# Patient Record
Sex: FEMALE | Race: WHITE | NOT HISPANIC OR LATINO | Employment: OTHER | URBAN - METROPOLITAN AREA
[De-identification: names, ages, dates, MRNs, and addresses within clinical notes are randomized per-mention and may not be internally consistent; named-entity substitution may affect disease eponyms.]

---

## 2017-03-21 DIAGNOSIS — M85.80 OSTEOPENIA: ICD-10-CM

## 2017-03-21 DIAGNOSIS — E78.5 HYPERLIPIDEMIA, UNSPECIFIED HYPERLIPIDEMIA TYPE: Primary | ICD-10-CM

## 2017-03-21 LAB
ALBUMIN SERPL-MCNC: 4.6 G/DL (ref 3.5–5.2)
ALBUMIN/GLOB SERPL: 1.8 G/DL
ALP SERPL-CCNC: 75 U/L (ref 39–117)
ALT SERPL-CCNC: 21 U/L (ref 1–33)
APPEARANCE UR: CLEAR
AST SERPL-CCNC: 23 U/L (ref 1–32)
BACTERIA #/AREA URNS HPF: ABNORMAL /HPF
BASOPHILS # BLD AUTO: 0.05 10*3/MM3 (ref 0–0.2)
BASOPHILS NFR BLD AUTO: 1.2 % (ref 0–1.5)
BILIRUB SERPL-MCNC: 0.3 MG/DL (ref 0.1–1.2)
BILIRUB UR QL STRIP: NEGATIVE
BUN SERPL-MCNC: 13 MG/DL (ref 6–20)
BUN/CREAT SERPL: 15.7 (ref 7–25)
CALCIUM SERPL-MCNC: 9.9 MG/DL (ref 8.6–10.5)
CASTS URNS MICRO: ABNORMAL
CHLORIDE SERPL-SCNC: 105 MMOL/L (ref 98–107)
CHOLEST SERPL-MCNC: 265 MG/DL (ref 0–200)
CO2 SERPL-SCNC: 25.8 MMOL/L (ref 22–29)
COLOR UR: YELLOW
CREAT SERPL-MCNC: 0.83 MG/DL (ref 0.57–1)
EOSINOPHIL # BLD AUTO: 0.23 10*3/MM3 (ref 0–0.7)
EOSINOPHIL NFR BLD AUTO: 5.5 % (ref 0.3–6.2)
EPI CELLS #/AREA URNS HPF: ABNORMAL /HPF
ERYTHROCYTE [DISTWIDTH] IN BLOOD BY AUTOMATED COUNT: 13.4 % (ref 11.7–13)
GLOBULIN SER CALC-MCNC: 2.5 GM/DL
GLUCOSE SERPL-MCNC: 94 MG/DL (ref 65–99)
GLUCOSE UR QL: NEGATIVE
HCT VFR BLD AUTO: 43.8 % (ref 35.6–45.5)
HDLC SERPL-MCNC: 89 MG/DL (ref 40–60)
HGB BLD-MCNC: 13.8 G/DL (ref 11.9–15.5)
HGB UR QL STRIP: NEGATIVE
IMM GRANULOCYTES # BLD: 0 10*3/MM3 (ref 0–0.03)
IMM GRANULOCYTES NFR BLD: 0 % (ref 0–0.5)
KETONES UR QL STRIP: NEGATIVE
LDLC SERPL CALC-MCNC: 159 MG/DL (ref 0–100)
LEUKOCYTE ESTERASE UR QL STRIP: NEGATIVE
LYMPHOCYTES # BLD AUTO: 1.51 10*3/MM3 (ref 0.9–4.8)
LYMPHOCYTES NFR BLD AUTO: 35.8 % (ref 19.6–45.3)
MCH RBC QN AUTO: 29.9 PG (ref 26.9–32)
MCHC RBC AUTO-ENTMCNC: 31.5 G/DL (ref 32.4–36.3)
MCV RBC AUTO: 94.8 FL (ref 80.5–98.2)
MONOCYTES # BLD AUTO: 0.47 10*3/MM3 (ref 0.2–1.2)
MONOCYTES NFR BLD AUTO: 11.1 % (ref 5–12)
NEUTROPHILS # BLD AUTO: 1.96 10*3/MM3 (ref 1.9–8.1)
NEUTROPHILS NFR BLD AUTO: 46.4 % (ref 42.7–76)
NITRITE UR QL STRIP: NEGATIVE
PH UR STRIP: 5.5 [PH] (ref 5–8)
PLATELET # BLD AUTO: 251 10*3/MM3 (ref 140–500)
POTASSIUM SERPL-SCNC: 4.2 MMOL/L (ref 3.5–5.2)
PROT SERPL-MCNC: 7.1 G/DL (ref 6–8.5)
PROT UR QL STRIP: NEGATIVE
RBC # BLD AUTO: 4.62 10*6/MM3 (ref 3.9–5.2)
RBC #/AREA URNS HPF: ABNORMAL /HPF
SODIUM SERPL-SCNC: 144 MMOL/L (ref 136–145)
SP GR UR: 1.03 (ref 1–1.03)
T4 FREE SERPL-MCNC: 1.14 NG/DL (ref 0.93–1.7)
TRIGL SERPL-MCNC: 86 MG/DL (ref 0–150)
TSH SERPL DL<=0.005 MIU/L-ACNC: 5.06 MIU/ML (ref 0.27–4.2)
UROBILINOGEN UR STRIP-MCNC: (no result) MG/DL
VLDLC SERPL CALC-MCNC: 17.2 MG/DL (ref 5–40)
WBC # BLD AUTO: 4.22 10*3/MM3 (ref 4.5–10.7)
WBC #/AREA URNS HPF: ABNORMAL /HPF

## 2017-03-28 ENCOUNTER — OFFICE VISIT (OUTPATIENT)
Dept: INTERNAL MEDICINE | Facility: CLINIC | Age: 57
End: 2017-03-28

## 2017-03-28 VITALS
HEART RATE: 72 BPM | WEIGHT: 111 LBS | BODY MASS INDEX: 19.67 KG/M2 | DIASTOLIC BLOOD PRESSURE: 70 MMHG | SYSTOLIC BLOOD PRESSURE: 102 MMHG | OXYGEN SATURATION: 98 % | HEIGHT: 63 IN

## 2017-03-28 DIAGNOSIS — M85.80 OSTEOPENIA: ICD-10-CM

## 2017-03-28 DIAGNOSIS — E78.5 HYPERLIPIDEMIA, UNSPECIFIED HYPERLIPIDEMIA TYPE: ICD-10-CM

## 2017-03-28 DIAGNOSIS — Z00.00 MEDICARE ANNUAL WELLNESS VISIT, SUBSEQUENT: Primary | ICD-10-CM

## 2017-03-28 DIAGNOSIS — Z13.9 SCREENING: Primary | ICD-10-CM

## 2017-03-28 PROCEDURE — G0439 PPPS, SUBSEQ VISIT: HCPCS | Performed by: INTERNAL MEDICINE

## 2017-03-28 PROCEDURE — 99212 OFFICE O/P EST SF 10 MIN: CPT | Performed by: INTERNAL MEDICINE

## 2017-03-28 PROCEDURE — 93000 ELECTROCARDIOGRAM COMPLETE: CPT | Performed by: INTERNAL MEDICINE

## 2017-03-28 RX ORDER — MISOPROSTOL 100 UG/1
100 TABLET ORAL 2 TIMES DAILY
COMMUNITY
End: 2019-01-25

## 2017-03-28 NOTE — PROGRESS NOTES
Subjective     Leonie Becker is a 56 y.o. female who presents for an annual wellness visit as well as check up of hld, osteopenia.      History of Present Illness     HLD.  She trys to follow a healthy diet but limited by IC.    Osteopenia.  I recommend to get 1200 mg of calcium and 1000 IUs of vitamin D through diet and supplements and to participate in a weight based exercise to prevent loss of bone mineral density. Bone mineral will be monitored every two years.        Review of Systems   Genitourinary: Positive for dysuria.   Musculoskeletal: Positive for neck pain.       The following portions of the patient's history were reviewed and updated as appropriate: allergies, current medications, past family history, past medical history, past social history, past surgical history and problem list.  Health maintenance tab was reviewed and updated with the patient.       Patient Active Problem List    Diagnosis Date Noted   • Pelvic floor dysfunction 03/24/2016   • Atopic rhinitis 03/23/2016   • Neck pain 03/23/2016   • Chronic interstitial cystitis 03/23/2016     Note Last Updated: 3/23/2016     Description: Extreme case followed by urologist at  as well as Dr. Mendez Franz.     • Degeneration of intervertebral disc of cervical region 03/23/2016   • Hyperlipidemia 03/23/2016     Note Last Updated: 3/28/2017     Description: 1.9% 10 year risk of ASCVD in 12/8/14; 1.6% on 3/23/16, 1.5% 10 year risk 3/28/2017     • Osteopenia 03/23/2016   • Tinnitus 02/01/2016   • Degeneration of intervertebral disc of mid-cervical region 02/01/2016       Past Medical History:   Diagnosis Date   • Abnormal blood smear    • Abnormal liver function test    • Back pain    • Blood tests for routine general physical examination    • Facial basal cell cancer    • Limb pain    • Rosacea        Past Surgical History:   Procedure Laterality Date   • CYSTOSCOPY      Diagnostic   • KNEE SURGERY         Family History   Problem Relation Age of  Onset   • Hypertension Mother      benign essential   • Diabetes Mother    • Heart disease Mother    • Thyroid disease Mother    • Aortic aneurysm Father      Thoracic   • Glaucoma Father    • Depression Sister    • Thyroid disease Sister        Social History     Social History   • Marital status: Single     Spouse name: N/A   • Number of children: N/A   • Years of education: N/A     Occupational History   • Not on file.     Social History Main Topics   • Smoking status: Former Smoker     Years: 2.00     Start date: 1992     Quit date: 1994   • Smokeless tobacco: Never Used   • Alcohol use No   • Drug use: No   • Sexual activity: Not on file     Other Topics Concern   • Not on file     Social History Narrative       Current Outpatient Prescriptions on File Prior to Visit   Medication Sig Dispense Refill   • baclofen (LIORESAL) 10 MG tablet Take by mouth 3 (three) times a day as needed. 1 to 2 tablets     • Calcium-Magnesium 200-50 MG tablet Take by mouth.     • Cholecalciferol (VITAMIN D3) 400 UNITS capsule Take by mouth.     • Cod Liver Oil 10 MINIM capsule Take by mouth.     • diazepam (VALIUM) 5 MG tablet Take 1 tablet by mouth as needed.     • Efinaconazole 10 % solution Apply topically.     • Estriol powder      • Hormone Cream Base cream      • lidocaine (LIDODERM) 5 % Apply 1 patch topically as needed.     • metroNIDAZOLE (METROCREAM) 0.75 % cream   6   • Omega-3 Fatty Acids (FISH OIL PO) Take by mouth.     • oxyCODONE (ROXICODONE) 5 MG immediate release tablet Take 1 to 2 tablets every 4 to 6 hours as needed for pain     • promethazine (PHENERGAN) 25 MG tablet   2   • traMADol (ULTRAM) 50 MG tablet Take by mouth.     • Urea 45 % gel Apply topically 2 (two) times a day. as directed Apply sparingly to the affected area(s)     • vitamin E 1000 UNIT capsule Take 1 capsule by mouth daily.     • Progesterone Micronized (PROGESTERONE, BULK,) powder Place on the skin.       • [DISCONTINUED] CALCIUM PO Take by  "mouth.     • [DISCONTINUED] nortriptyline (PAMELOR) 10 MG capsule Take by mouth.     • [DISCONTINUED] ondansetron (ZOFRAN) 4 MG tablet Take 1 tablet by mouth every 8 (eight) hours as needed.     • [DISCONTINUED] pentosan polysulfate (ELMIRON) 100 MG capsule Take by mouth.     • [DISCONTINUED] uribel (URO-MP) 118 MG capsule capsule Take 1 capsule by mouth every 3 (three) hours.       No current facility-administered medications on file prior to visit.        No Known Allergies    Immunization History   Administered Date(s) Administered   • Influenza, Quadrivalent 10/08/2015   • Tdap 10/01/2006       Objective     /70  Pulse 72  Ht 63\" (160 cm)  Wt 111 lb (50.3 kg)  SpO2 98%  BMI 19.66 kg/m2      Physical Exam   Constitutional: She is oriented to person, place, and time. She appears well-developed and well-nourished.   HENT:   Head: Normocephalic and atraumatic.   Right Ear: Hearing, tympanic membrane and external ear normal.   Left Ear: Hearing, tympanic membrane and external ear normal.   Nose: Nose normal.   Mouth/Throat: Oropharynx is clear and moist.   Neck: Neck supple. No thyromegaly present.   Cardiovascular: Normal rate, regular rhythm and normal heart sounds.    No murmur heard.  Pulmonary/Chest: Effort normal and breath sounds normal. Right breast exhibits no mass. Left breast exhibits no mass.   Abdominal: Soft. She exhibits no distension. There is no hepatosplenomegaly. There is no tenderness.   Genitourinary: No breast tenderness.   Lymphadenopathy:     She has no cervical adenopathy.   Neurological: She is alert and oriented to person, place, and time.   Skin: Skin is warm and dry.   Psychiatric: She has a normal mood and affect.       ECG 12 Lead  Date/Time: 3/28/2017 12:41 PM  Performed by: JAD RUVALCABA  Authorized by: JAD RUVALCABA   Comparison: compared with previous ECG from 3/16/2016  Similar to previous ECG  Rhythm: sinus rhythm  Rate: normal  Conduction: conduction normal  ST " Segments: ST segments normal  QRS axis: normal  Clinical impression: normal ECG            Assessment/Plan   Leonie was seen today for annual exam.    Diagnoses and all orders for this visit:    Medicare annual wellness visit, subsequent    Hyperlipidemia, unspecified hyperlipidemia type    Osteopenia    Other orders  -     Cancel: Tdap Vaccine Greater Than or Equal To 6yo IM  -     Cancel: ECG 12 Lead  -     ECG 12 Lead        Discussion    AWV.  See scanned forms for kannan history, PHQ-9, functional ability questionnaire, cognitive impairment screening.  These were all reviewed with the patient and the patient was provided with a personal prevention plan of service in patient instructions.  Patient was given advice or information on the following topics:  nutrition   HLD.  We discussed diet.  I recommend a diet high in fruits, vegetables, whole grains, lean meats, nuts and beans.  I recommend limiting red meat, full fat dairy, eggs and processed white carbohydrates.  I recommend aerobic exercise at least 3 days per week. I also recommend to watch salt intake.    Osteonpenia. Continue calcium and D.      Health Maintenance   Topic Date Due   • MEDICARE ANNUAL WELLNESS  03/16/2016   • DXA SCAN  03/21/2017   • LIPID PANEL  03/21/2018   • MAMMOGRAM  08/31/2018   • PAP SMEAR  08/31/2019   • COLONOSCOPY  09/10/2024   • TDAP/TD VACCINES (3 - Td) 03/28/2027   • HEPATITIS C SCREENING  Completed   • INFLUENZA VACCINE  Completed            Future Appointments  Date Time Provider Department Center   4/11/2017 2:00 PM [facility] None

## 2017-03-28 NOTE — PROGRESS NOTES
Devonte Becker is a 56 y.o. female who presents for a complete physical exam.      History of Present Illness     Review of Systems    The following portions of the patient's history were reviewed and updated as appropriate: allergies, current medications, past family history, past medical history, past social history, past surgical history and problem list.  Health maintenance tab was reviewed and updated with the patient.       Patient Active Problem List    Diagnosis Date Noted   • Pelvic floor dysfunction 03/24/2016   • Abnormal LFTs 03/24/2016     Note Last Updated: 3/24/2016     Previous negative iron studies and hepatitis panel.  Normal US.  Thought to be med induced and is mild.       • Atopic rhinitis 03/23/2016   • Neck pain 03/23/2016   • Chronic interstitial cystitis 03/23/2016     Note Last Updated: 3/23/2016     Description: Extreme case followed by urologist at  as well as Dr. Mendez Franz.     • Degeneration of intervertebral disc of cervical region 03/23/2016   • Hyperlipidemia 03/23/2016     Note Last Updated: 3/28/2017     Description: 1.9% 10 year risk of ASCVD in 12/8/14; 1.6% on 3/23/16, 1.5% 10 year risk 3/28/2017     • Osteopenia 03/23/2016   • Tinnitus 02/01/2016   • Degeneration of intervertebral disc of mid-cervical region 02/01/2016       Past Medical History:   Diagnosis Date   • Abnormal blood smear    • Abnormal liver function test    • Back pain    • Blood tests for routine general physical examination    • Limb pain    • Rosacea        Past Surgical History:   Procedure Laterality Date   • CYSTOSCOPY      Diagnostic   • KNEE SURGERY         Family History   Problem Relation Age of Onset   • Hypertension Mother      benign essential   • Diabetes Mother    • Heart disease Mother    • Thyroid disease Mother    • Aortic aneurysm Father      Thoracic   • Glaucoma Father    • Depression Sister    • Thyroid disease Sister        Social History     Social History   •  Marital status: Single     Spouse name: N/A   • Number of children: N/A   • Years of education: N/A     Occupational History   • Not on file.     Social History Main Topics   • Smoking status: Former Smoker     Years: 2.00     Start date: 1992     Quit date: 1994   • Smokeless tobacco: Never Used   • Alcohol use No   • Drug use: No   • Sexual activity: Not on file     Other Topics Concern   • Not on file     Social History Narrative       Current Outpatient Prescriptions on File Prior to Visit   Medication Sig Dispense Refill   • baclofen (LIORESAL) 10 MG tablet Take by mouth 3 (three) times a day as needed. 1 to 2 tablets     • Calcium-Magnesium 200-50 MG tablet Take by mouth.     • Cholecalciferol (VITAMIN D3) 400 UNITS capsule Take by mouth.     • Cod Liver Oil 10 MINIM capsule Take by mouth.     • diazepam (VALIUM) 5 MG tablet Take 1 tablet by mouth as needed.     • Efinaconazole 10 % solution Apply topically.     • Estriol powder      • Hormone Cream Base cream      • lidocaine (LIDODERM) 5 % Apply 1 patch topically as needed.     • metroNIDAZOLE (METROCREAM) 0.75 % cream   6   • Omega-3 Fatty Acids (FISH OIL PO) Take by mouth.     • oxyCODONE (ROXICODONE) 5 MG immediate release tablet Take 1 to 2 tablets every 4 to 6 hours as needed for pain     • promethazine (PHENERGAN) 25 MG tablet   2   • traMADol (ULTRAM) 50 MG tablet Take by mouth.     • Urea 45 % gel Apply topically 2 (two) times a day. as directed Apply sparingly to the affected area(s)     • vitamin E 1000 UNIT capsule Take 1 capsule by mouth daily.     • Progesterone Micronized (PROGESTERONE, BULK,) powder Place on the skin.       • [DISCONTINUED] CALCIUM PO Take by mouth.     • [DISCONTINUED] nortriptyline (PAMELOR) 10 MG capsule Take by mouth.     • [DISCONTINUED] ondansetron (ZOFRAN) 4 MG tablet Take 1 tablet by mouth every 8 (eight) hours as needed.     • [DISCONTINUED] pentosan polysulfate (ELMIRON) 100 MG capsule Take by mouth.     •  "[DISCONTINUED] uribel (URO-MP) 118 MG capsule capsule Take 1 capsule by mouth every 3 (three) hours.       No current facility-administered medications on file prior to visit.        No Known Allergies    Immunization History   Administered Date(s) Administered   • Influenza, Quadrivalent 10/08/2015   • Tdap 10/01/2006       Objective     /70  Pulse 72  Ht 63\" (160 cm)  Wt 111 lb (50.3 kg)  SpO2 98%  BMI 19.66 kg/m2    Physical Exam      ECG 12 Lead  Date/Time: 3/28/2017 10:55 AM  Performed by: JAD RUVALCABA  Authorized by: JAD RUVALCABA                 Assessment/Plan   Leonie was seen today for annual exam.    Diagnoses and all orders for this visit:    Well adult exam    Other orders  -     Cancel: Tdap Vaccine Greater Than or Equal To 8yo IM        Discussion    Patient presents today for a CPE.      {[id number]}    Health Maintenance   Topic Date Due   • DXA SCAN  03/21/2017   • LIPID PANEL  03/21/2018   • MAMMOGRAM  08/31/2018   • PAP SMEAR  08/31/2019   • COLONOSCOPY  09/10/2024   • TDAP/TD VACCINES (3 - Td) 03/28/2027   • HEPATITIS C SCREENING  Completed   • INFLUENZA VACCINE  Completed            No future appointments.    "

## 2017-04-05 ENCOUNTER — HOSPITAL ENCOUNTER (OUTPATIENT)
Dept: CARDIOLOGY | Facility: HOSPITAL | Age: 57
Discharge: HOME | End: 2017-04-05
Admitting: INTERNAL MEDICINE

## 2017-04-05 VITALS
BODY MASS INDEX: 19.49 KG/M2 | HEIGHT: 63 IN | HEART RATE: 59 BPM | SYSTOLIC BLOOD PRESSURE: 111 MMHG | DIASTOLIC BLOOD PRESSURE: 56 MMHG | WEIGHT: 110 LBS

## 2017-04-05 DIAGNOSIS — Z13.9 SCREENING: ICD-10-CM

## 2017-04-05 LAB
BH CV ECHO MEAS - DIST AO DIAM: 1.46 CM
BH CV VAS BP LEFT ARM: NORMAL MMHG
BH CV VAS BP RIGHT ARM: NORMAL MMHG
BH CV XLRA MEAS - MID AO DIAM: 1.52 CM
BH CV XLRA MEAS - PAD LEFT ABI DP: 1.02
BH CV XLRA MEAS - PAD LEFT ABI PT: 1.04
BH CV XLRA MEAS - PAD LEFT ARM: 106 MMHG
BH CV XLRA MEAS - PAD LEFT LEG DP: 114 MMHG
BH CV XLRA MEAS - PAD LEFT LEG PT: 116 MMHG
BH CV XLRA MEAS - PAD RIGHT ABI DP: 1.02
BH CV XLRA MEAS - PAD RIGHT ABI PT: 1.04
BH CV XLRA MEAS - PAD RIGHT ARM: 111 MMHG
BH CV XLRA MEAS - PAD RIGHT LEG DP: 114 MMHG
BH CV XLRA MEAS - PAD RIGHT LEG PT: 116 MMHG
BH CV XLRA MEAS - PROX AO DIAM: 1.73 CM
BH CV XLRA MEAS LEFT ICA/CCA RATIO: 0.95
BH CV XLRA MEAS LEFT MID CCA PSV: NORMAL CM/SEC
BH CV XLRA MEAS LEFT MID ICA PSV: NORMAL CM/SEC
BH CV XLRA MEAS LEFT PROX ECA PSV: NORMAL CM/SEC
BH CV XLRA MEAS RIGHT ICA/CCA RATIO: 1.53
BH CV XLRA MEAS RIGHT MID CCA PSV: NORMAL CM/SEC
BH CV XLRA MEAS RIGHT MID ICA PSV: NORMAL CM/SEC
BH CV XLRA MEAS RIGHT PROX ECA PSV: NORMAL CM/SEC

## 2017-04-05 PROCEDURE — 93799 UNLISTED CV SVC/PROCEDURE: CPT

## 2017-04-11 ENCOUNTER — CLINICAL SUPPORT (OUTPATIENT)
Dept: INTERNAL MEDICINE | Facility: CLINIC | Age: 57
End: 2017-04-11

## 2017-04-11 DIAGNOSIS — Z78.0 POSTMENOPAUSAL: Primary | ICD-10-CM

## 2017-04-11 PROCEDURE — 77080 DXA BONE DENSITY AXIAL: CPT | Performed by: INTERNAL MEDICINE

## 2017-04-24 ENCOUNTER — OFFICE VISIT (OUTPATIENT)
Dept: INTERNAL MEDICINE | Facility: CLINIC | Age: 57
End: 2017-04-24

## 2017-04-24 VITALS
DIASTOLIC BLOOD PRESSURE: 76 MMHG | BODY MASS INDEX: 19.84 KG/M2 | HEIGHT: 63 IN | WEIGHT: 112 LBS | SYSTOLIC BLOOD PRESSURE: 110 MMHG | OXYGEN SATURATION: 100 % | HEART RATE: 68 BPM

## 2017-04-24 DIAGNOSIS — M81.0 OSTEOPOROSIS: Primary | ICD-10-CM

## 2017-04-24 PROCEDURE — 99213 OFFICE O/P EST LOW 20 MIN: CPT | Performed by: INTERNAL MEDICINE

## 2017-04-24 PROCEDURE — 90471 IMMUNIZATION ADMIN: CPT | Performed by: INTERNAL MEDICINE

## 2017-04-24 PROCEDURE — 90715 TDAP VACCINE 7 YRS/> IM: CPT | Performed by: INTERNAL MEDICINE

## 2017-04-24 RX ORDER — ALENDRONATE SODIUM 70 MG/1
70 TABLET ORAL
Qty: 12 TABLET | Refills: 3 | Status: SHIPPED | OUTPATIENT
Start: 2017-04-24 | End: 2018-05-26 | Stop reason: SDUPTHER

## 2017-04-24 NOTE — PROGRESS NOTES
Devonte Becker is a 56 y.o. female who presents with   Chief Complaint   Patient presents with   • Osteoporosis       History of Present Illness     I reviewed recent BMD with the patient which showed osteoporosis in the spine.  I reviewed treatments for bone density with the patient.  We discussed the  bisphosphonate class of drugs.  I reviewed the side effects which include esophagitis, rare atypical fracture and rare jaw osteonecrosis.  The benefit is a decrease in the risk for fracture.  Reviewed with patient how to take bisphosphonate class of drugs.  The are to be taken with a full class of water first thing in the morning with nothing by mouth for one hour after. The patient has no ongoing dental issues.  She has no issues with reflux.      Review of Systems   HENT: Negative.    Gastrointestinal: Negative.        The following portions of the patient's history were reviewed and updated as appropriate: allergies, current medications and problem list.    Patient Active Problem List    Diagnosis Date Noted   • Pelvic floor dysfunction 03/24/2016   • Atopic rhinitis 03/23/2016   • Neck pain 03/23/2016   • Chronic interstitial cystitis 03/23/2016     Note Last Updated: 3/23/2016     Description: Extreme case followed by urologist at  as well as Dr. Mendez Frazn.     • Degeneration of intervertebral disc of cervical region 03/23/2016   • Hyperlipidemia 03/23/2016     Note Last Updated: 3/28/2017     Description: 1.9% 10 year risk of ASCVD in 12/8/14; 1.6% on 3/23/16, 1.5% 10 year risk 3/28/2017     • Osteoporosis 03/23/2016   • Tinnitus 02/01/2016   • Degeneration of intervertebral disc of mid-cervical region 02/01/2016       Current Outpatient Prescriptions on File Prior to Visit   Medication Sig Dispense Refill   • Calcium-Magnesium 200-50 MG tablet Take by mouth.     • Cholecalciferol (VITAMIN D3) 400 UNITS capsule Take by mouth.     • Cod Liver Oil 10 MINIM capsule Take by mouth.     •  "diclofenac (VOLTAREN) 1 % gel gel Apply 4 g topically 4 (Four) Times a Day As Needed.     • Efinaconazole 10 % solution Apply topically.     • Estriol powder      • Hormone Cream Base cream      • lidocaine (LIDODERM) 5 % Apply 1 patch topically as needed.     • metroNIDAZOLE (METROCREAM) 0.75 % cream   6   • misoprostol (CYTOTEC) 100 MCG tablet Take 100 mcg by mouth 2 (Two) Times a Day.     • Omega-3 Fatty Acids (FISH OIL PO) Take by mouth.     • oxyCODONE (ROXICODONE) 5 MG immediate release tablet Take 1 to 2 tablets every 4 to 6 hours as needed for pain     • Progesterone Micronized (PROGESTERONE, BULK,) powder Place on the skin.       • promethazine (PHENERGAN) 25 MG tablet   2   • traMADol (ULTRAM) 50 MG tablet Take by mouth.     • Urea 45 % gel Apply topically 2 (two) times a day. as directed Apply sparingly to the affected area(s)     • vitamin E 1000 UNIT capsule Take 1 capsule by mouth daily.     • baclofen (LIORESAL) 10 MG tablet Take by mouth 3 (three) times a day as needed. 1 to 2 tablets     • diazepam (VALIUM) 5 MG tablet Take 1 tablet by mouth as needed.       No current facility-administered medications on file prior to visit.        Objective     /76  Pulse 68  Ht 63\" (160 cm)  Wt 112 lb (50.8 kg)  SpO2 100%  BMI 19.84 kg/m2    Physical Exam   Constitutional: She is oriented to person, place, and time. She appears well-developed and well-nourished.   HENT:   Head: Normocephalic and atraumatic.   Pulmonary/Chest: Effort normal.   Neurological: She is alert and oriented to person, place, and time.   Psychiatric: She has a normal mood and affect. Her behavior is normal.       Assessment/Plan   Leonie was seen today for osteoporosis.    Diagnoses and all orders for this visit:    Osteoporosis    Other orders  -     Tdap Vaccine Greater Than or Equal To 6yo IM  -     alendronate (FOSAMAX) 70 MG tablet; Take 1 tablet by mouth Every 7 (Seven) Days.        Discussion    Patient presents with a " drop in BMD to the level of osteoporosis.  I recommend to get 1200 mg of calcium and 1000 IUs of vitamin D through diet and supplements and to participate in a weight based exercise to prevent loss of bone mineral density. Bone mineral will be monitored every two years.  She is agreeable to trial of Fosamax.  See counseling above.  She will let me know any issues with the medication.  15 minutes spent with the patient with greater than 50% of time spent counseling the following topics:, diagnostic results, risks and benefits of treatment options, impressions             No future appointments.

## 2017-11-07 ENCOUNTER — APPOINTMENT (OUTPATIENT)
Dept: WOMENS IMAGING | Facility: HOSPITAL | Age: 57
End: 2017-11-07

## 2017-11-07 PROCEDURE — 77063 BREAST TOMOSYNTHESIS BI: CPT | Performed by: RADIOLOGY

## 2017-11-07 PROCEDURE — G0202 SCR MAMMO BI INCL CAD: HCPCS | Performed by: RADIOLOGY

## 2017-11-17 ENCOUNTER — OFFICE VISIT (OUTPATIENT)
Dept: INTERNAL MEDICINE | Facility: CLINIC | Age: 57
End: 2017-11-17

## 2017-11-17 VITALS
HEART RATE: 91 BPM | BODY MASS INDEX: 18.61 KG/M2 | SYSTOLIC BLOOD PRESSURE: 112 MMHG | DIASTOLIC BLOOD PRESSURE: 80 MMHG | HEIGHT: 63 IN | WEIGHT: 105 LBS | OXYGEN SATURATION: 99 % | TEMPERATURE: 97.6 F

## 2017-11-17 DIAGNOSIS — J02.9 ACUTE PHARYNGITIS, UNSPECIFIED ETIOLOGY: Primary | ICD-10-CM

## 2017-11-17 LAB
EXPIRATION DATE: NORMAL
INTERNAL CONTROL: NORMAL
Lab: NORMAL
S PYO AG THROAT QL: NEGATIVE

## 2017-11-17 PROCEDURE — 87880 STREP A ASSAY W/OPTIC: CPT | Performed by: INTERNAL MEDICINE

## 2017-11-17 PROCEDURE — 99213 OFFICE O/P EST LOW 20 MIN: CPT | Performed by: INTERNAL MEDICINE

## 2017-11-17 NOTE — PROGRESS NOTES
Devonte Becker is a 57 y.o. female who presents with   Chief Complaint   Patient presents with   • Sore Throat       History of Present Illness     Going on one week.  Lost voice four days ago.  Feels like lump in throat. No head or nasal congestion.  Cough which is dry.  No SOA.      Review of Systems   Constitutional: Positive for fatigue. Negative for fever.   HENT: Positive for sore throat.    Respiratory: Negative for shortness of breath.    Cardiovascular: Negative for chest pain.   Musculoskeletal: Positive for arthralgias.       The following portions of the patient's history were reviewed and updated as appropriate: allergies, current medications and problem list.    Patient Active Problem List    Diagnosis Date Noted   • Pelvic floor dysfunction 03/24/2016   • Atopic rhinitis 03/23/2016   • Neck pain 03/23/2016   • Chronic interstitial cystitis 03/23/2016     Note Last Updated: 3/23/2016     Description: Extreme case followed by urologist at  as well as Dr. Mendez Franz.     • Degeneration of intervertebral disc of cervical region 03/23/2016   • Hyperlipidemia 03/23/2016     Note Last Updated: 3/28/2017     Description: 1.9% 10 year risk of ASCVD in 12/8/14; 1.6% on 3/23/16, 1.5% 10 year risk 3/28/2017     • Osteoporosis 03/23/2016   • Tinnitus 02/01/2016   • Degeneration of intervertebral disc of mid-cervical region 02/01/2016       Current Outpatient Prescriptions on File Prior to Visit   Medication Sig Dispense Refill   • alendronate (FOSAMAX) 70 MG tablet Take 1 tablet by mouth Every 7 (Seven) Days. 12 tablet 3   • baclofen (LIORESAL) 10 MG tablet Take by mouth 3 (three) times a day as needed. 1 to 2 tablets     • Calcium-Magnesium 200-50 MG tablet Take by mouth.     • Cholecalciferol (VITAMIN D3) 400 UNITS capsule Take by mouth.     • Cod Liver Oil 10 MINIM capsule Take by mouth.     • diazepam (VALIUM) 5 MG tablet Take 1 tablet by mouth as needed.     • diclofenac (VOLTAREN) 1 %  "gel gel Apply 4 g topically 4 (Four) Times a Day As Needed.     • Efinaconazole 10 % solution Apply topically.     • Estriol powder      • Hormone Cream Base cream      • lidocaine (LIDODERM) 5 % Apply 1 patch topically as needed.     • misoprostol (CYTOTEC) 100 MCG tablet Take 100 mcg by mouth 2 (Two) Times a Day.     • Omega-3 Fatty Acids (FISH OIL PO) Take by mouth.     • oxyCODONE (ROXICODONE) 5 MG immediate release tablet Take 1 to 2 tablets every 4 to 6 hours as needed for pain     • Progesterone Micronized (PROGESTERONE, BULK,) powder Place on the skin.       • promethazine (PHENERGAN) 25 MG tablet   2   • traMADol (ULTRAM) 50 MG tablet Take by mouth.     • Urea 45 % gel Apply topically 2 (two) times a day. as directed Apply sparingly to the affected area(s)     • vitamin E 1000 UNIT capsule Take 1 capsule by mouth daily.     • metroNIDAZOLE (METROCREAM) 0.75 % cream   6     No current facility-administered medications on file prior to visit.        Objective     /80  Pulse 91  Temp 97.6 °F (36.4 °C)  Ht 63\" (160 cm)  Wt 105 lb (47.6 kg)  SpO2 99%  BMI 18.6 kg/m2    Physical Exam   Constitutional: She is oriented to person, place, and time. She appears well-developed and well-nourished.   HENT:   Head: Normocephalic and atraumatic.   Right Ear: Hearing and tympanic membrane normal.   Left Ear: Hearing and tympanic membrane normal.   Mouth/Throat: Posterior oropharyngeal erythema present. No oropharyngeal exudate.   Cardiovascular: Normal rate, regular rhythm and normal heart sounds.    Pulmonary/Chest: Effort normal and breath sounds normal.   Neurological: She is alert and oriented to person, place, and time.   Skin: Skin is warm and dry.   Psychiatric: She has a normal mood and affect. Her behavior is normal.       Assessment/Plan   Leonie was seen today for sore throat.    Diagnoses and all orders for this visit:    Acute pharyngitis, unspecified etiology  -     POC Rapid Strep A  -     Throat " / Upper Respiratory Culture - Swab, Throat        Discussion  Patient presents with acute pharyngitis.  Results of the rapid group A strep in the office is negative.  We will send for culture and f/u on that.  She declines antibiotics right now.  Treat symptomatically with OTC tylenol, rest and fluids.  Let us know if not feeling better over the next 48 hours.  Advised that she is infectious to others.         Future Appointments  Date Time Provider Department Center   11/17/2017 4:30 PM MD REGULO [facility]  [facility] None

## 2017-11-21 LAB
BACTERIA SPEC RESP CULT: NORMAL
BACTERIA SPEC RESP CULT: NORMAL

## 2018-05-29 RX ORDER — ALENDRONATE SODIUM 70 MG/1
TABLET ORAL
Qty: 12 TABLET | Refills: 0 | Status: SHIPPED | OUTPATIENT
Start: 2018-05-29 | End: 2018-07-06 | Stop reason: SDUPTHER

## 2018-07-06 RX ORDER — ALENDRONATE SODIUM 70 MG/1
TABLET ORAL
Qty: 12 TABLET | Refills: 0 | Status: SHIPPED | OUTPATIENT
Start: 2018-07-06 | End: 2018-09-30 | Stop reason: SDUPTHER

## 2018-07-31 DIAGNOSIS — E78.5 HYPERLIPIDEMIA, UNSPECIFIED HYPERLIPIDEMIA TYPE: Primary | ICD-10-CM

## 2018-08-02 LAB
ALBUMIN SERPL-MCNC: 4.8 G/DL (ref 3.5–5.2)
ALBUMIN/GLOB SERPL: 2 G/DL
ALP SERPL-CCNC: 84 U/L (ref 39–117)
ALT SERPL-CCNC: 25 U/L (ref 1–33)
AST SERPL-CCNC: 23 U/L (ref 1–32)
BILIRUB SERPL-MCNC: 0.3 MG/DL (ref 0.1–1.2)
BUN SERPL-MCNC: 17 MG/DL (ref 8–23)
BUN/CREAT SERPL: 16.8 (ref 7–25)
CALCIUM SERPL-MCNC: 9.1 MG/DL (ref 8.6–10.5)
CHLORIDE SERPL-SCNC: 103 MMOL/L (ref 98–107)
CHOLEST SERPL-MCNC: 293 MG/DL (ref 0–200)
CO2 SERPL-SCNC: 25.5 MMOL/L (ref 22–29)
CREAT SERPL-MCNC: 1.01 MG/DL (ref 0.57–1)
GLOBULIN SER CALC-MCNC: 2.4 GM/DL
GLUCOSE SERPL-MCNC: 92 MG/DL (ref 65–99)
HDLC SERPL-MCNC: 100 MG/DL (ref 40–60)
LDLC SERPL CALC-MCNC: 175 MG/DL (ref 0–100)
POTASSIUM SERPL-SCNC: 3.7 MMOL/L (ref 3.5–5.2)
PROT SERPL-MCNC: 7.2 G/DL (ref 6–8.5)
SODIUM SERPL-SCNC: 142 MMOL/L (ref 136–145)
TRIGL SERPL-MCNC: 89 MG/DL (ref 0–150)
VLDLC SERPL CALC-MCNC: 17.8 MG/DL (ref 5–40)

## 2018-08-07 ENCOUNTER — OFFICE VISIT (OUTPATIENT)
Dept: INTERNAL MEDICINE | Facility: CLINIC | Age: 58
End: 2018-08-07

## 2018-08-07 VITALS
BODY MASS INDEX: 18.42 KG/M2 | OXYGEN SATURATION: 98 % | HEART RATE: 71 BPM | SYSTOLIC BLOOD PRESSURE: 108 MMHG | WEIGHT: 104 LBS | DIASTOLIC BLOOD PRESSURE: 70 MMHG

## 2018-08-07 DIAGNOSIS — E78.5 HYPERLIPIDEMIA, UNSPECIFIED HYPERLIPIDEMIA TYPE: Primary | ICD-10-CM

## 2018-08-07 DIAGNOSIS — Z13.6 SCREENING FOR HEART DISEASE: ICD-10-CM

## 2018-08-07 DIAGNOSIS — M81.0 OSTEOPOROSIS, UNSPECIFIED OSTEOPOROSIS TYPE, UNSPECIFIED PATHOLOGICAL FRACTURE PRESENCE: ICD-10-CM

## 2018-08-07 PROCEDURE — 99213 OFFICE O/P EST LOW 20 MIN: CPT | Performed by: INTERNAL MEDICINE

## 2018-08-07 NOTE — PROGRESS NOTES
Devonte Becker is a 58 y.o. female who presents with   Chief Complaint   Patient presents with   • Hyperlipidemia   • Osteoporosis       History of Present Illness     HLD.   Her cholesterol is elevated.  She is very limited from a dietary standpoint on what she can eat with the IC.  She really does not want to take meds.  10 year risk calculated at 2.1%.  Vascular screen one year ago normal. Discussed CT coronary calcium.   OP.  She is on Fosamax without issue.    Review of Systems   Respiratory: Negative.    Cardiovascular: Negative.        The following portions of the patient's history were reviewed and updated as appropriate: allergies, current medications and problem list.    Patient Active Problem List    Diagnosis Date Noted   • Pelvic floor dysfunction 03/24/2016   • Atopic rhinitis 03/23/2016   • Neck pain 03/23/2016   • Chronic interstitial cystitis 03/23/2016     Note Last Updated: 3/23/2016     Description: Extreme case followed by urologist at  as well as Dr. Mendez Franz.     • Degeneration of intervertebral disc of cervical region 03/23/2016   • Hyperlipidemia 03/23/2016     Note Last Updated: 8/7/2018     Description: 1.9% 10 year risk of ASCVD in 12/8/14; 1.6% on 3/23/16, 1.5% 10 year risk 3/28/2017;  2.1% 10 year risk.       • Osteoporosis 03/23/2016   • Tinnitus 02/01/2016   • Degeneration of intervertebral disc of mid-cervical region 02/01/2016       Current Outpatient Prescriptions on File Prior to Visit   Medication Sig Dispense Refill   • alendronate (FOSAMAX) 70 MG tablet TAKE 1 TABLET BY MOUTH ONCE WEEKLY 12 tablet 0   • baclofen (LIORESAL) 10 MG tablet Take by mouth 3 (three) times a day as needed. 1 to 2 tablets     • Calcium-Magnesium 200-50 MG tablet Take by mouth.     • Cholecalciferol (VITAMIN D3) 400 UNITS capsule Take by mouth.     • Cod Liver Oil 10 MINIM capsule Take by mouth.     • diazepam (VALIUM) 5 MG tablet Take 1 tablet by mouth as needed.     • diclofenac  (VOLTAREN) 1 % gel gel Apply 4 g topically 4 (Four) Times a Day As Needed.     • Efinaconazole 10 % solution Apply topically.     • Estriol powder      • Hormone Cream Base cream      • lidocaine (LIDODERM) 5 % Apply 1 patch topically as needed.     • metroNIDAZOLE (METROCREAM) 0.75 % cream   6   • misoprostol (CYTOTEC) 100 MCG tablet Take 100 mcg by mouth 2 (Two) Times a Day.     • Omega-3 Fatty Acids (FISH OIL PO) Take by mouth.     • oxyCODONE (ROXICODONE) 5 MG immediate release tablet Take 1 to 2 tablets every 4 to 6 hours as needed for pain     • Progesterone Micronized (PROGESTERONE, BULK,) powder Place on the skin.       • promethazine (PHENERGAN) 25 MG tablet   2   • traMADol (ULTRAM) 50 MG tablet Take by mouth.     • Urea 45 % gel Apply topically 2 (two) times a day. as directed Apply sparingly to the affected area(s)     • vitamin E 1000 UNIT capsule Take 1 capsule by mouth daily.       No current facility-administered medications on file prior to visit.        Objective     /70   Pulse 71   Wt 47.2 kg (104 lb)   SpO2 98%   BMI 18.42 kg/m²     Physical Exam   Constitutional: She is oriented to person, place, and time. She appears well-developed and well-nourished.   HENT:   Head: Normocephalic and atraumatic.   Pulmonary/Chest: Effort normal.   Neurological: She is alert and oriented to person, place, and time.   Psychiatric: She has a normal mood and affect. Her behavior is normal.       Assessment/Plan   Leonie was seen today for hyperlipidemia and osteoporosis.    Diagnoses and all orders for this visit:    Hyperlipidemia, unspecified hyperlipidemia type    Osteoporosis, unspecified osteoporosis type, unspecified pathological fracture presence    Screening for heart disease  -     CT cardiac calcium score wo dye; Future        Discussion  HLD.  We discussed diet.  I recommend a diet high in fruits, vegetables, whole grains, lean meats, nuts and beans.  I recommend limiting red meat, full fat  dairy, eggs and processed white carbohydrates. CT calcium screen in ordered.   OP.  I recommend to get 1200 mg of calcium and 1000 IUs of vitamin D through diet and supplements and to participate in a weight based exercise to prevent loss of bone mineral density. Bone mineral will be monitored every two years.  Continue Fosamax.           Future Appointments  Date Time Provider Department Center   1/21/2019 10:00 AM [facility] None   1/25/2019 9:00 AM Rachael Oconnell MD [facility] None

## 2018-08-21 ENCOUNTER — TELEPHONE (OUTPATIENT)
Dept: INTERNAL MEDICINE | Facility: CLINIC | Age: 58
End: 2018-08-21

## 2018-08-21 NOTE — TELEPHONE ENCOUNTER
Pt would like to know if she can start eating eggs once a week?  LG    OK to eat eggs once a week.  AJAYW

## 2018-09-17 ENCOUNTER — TELEPHONE (OUTPATIENT)
Dept: INTERNAL MEDICINE | Facility: CLINIC | Age: 58
End: 2018-09-17

## 2018-09-17 DIAGNOSIS — Z01.10 ENCOUNTER FOR HEARING EVALUATION: Primary | ICD-10-CM

## 2018-09-17 NOTE — TELEPHONE ENCOUNTER
She would like a referral to ENT. [facility]    For what issue?  SLW    To Dr Quiroga for hearing evaluation.

## 2018-09-21 ENCOUNTER — OFFICE VISIT (OUTPATIENT)
Dept: INTERNAL MEDICINE | Facility: CLINIC | Age: 58
End: 2018-09-21

## 2018-09-21 VITALS
DIASTOLIC BLOOD PRESSURE: 74 MMHG | WEIGHT: 103 LBS | OXYGEN SATURATION: 98 % | BODY MASS INDEX: 18.25 KG/M2 | HEART RATE: 81 BPM | SYSTOLIC BLOOD PRESSURE: 118 MMHG

## 2018-09-21 DIAGNOSIS — F41.9 ANXIETY: ICD-10-CM

## 2018-09-21 DIAGNOSIS — H93.13 TINNITUS OF BOTH EARS: Primary | ICD-10-CM

## 2018-09-21 DIAGNOSIS — H91.93 BILATERAL HEARING LOSS, UNSPECIFIED HEARING LOSS TYPE: ICD-10-CM

## 2018-09-21 PROCEDURE — 99213 OFFICE O/P EST LOW 20 MIN: CPT | Performed by: INTERNAL MEDICINE

## 2018-09-21 NOTE — PROGRESS NOTES
Devonte Becker is a 58 y.o. female who presents with   Chief Complaint   Patient presents with   • Tinnitus       History of Present Illness     C/o barotrauma on 7/16/2018.  Four loud noises since then.  Noises cause pain. Tinnitus off and on now which is severe at time.  Silence is helpful.  Sounds are now very bothersome to her now.  Bilateral ears with right being worse than the left.  She saw her ENT Dr. Vallejo yesterday.  She also has a audiologist.  She is very anxious.  She has tried numerous medications in the past without success.  She would like to see a psychiatrist.        Review of Systems   Respiratory: Negative.    Cardiovascular: Negative.        The following portions of the patient's history were reviewed and updated as appropriate: allergies, current medications and problem list.    Patient Active Problem List    Diagnosis Date Noted   • Pelvic floor dysfunction 03/24/2016   • Atopic rhinitis 03/23/2016   • Neck pain 03/23/2016   • Chronic interstitial cystitis 03/23/2016     Note Last Updated: 3/23/2016     Description: Extreme case followed by urologist at  as well as Dr. Mendez Franz.     • Degeneration of intervertebral disc of cervical region 03/23/2016   • Hyperlipidemia 03/23/2016     Note Last Updated: 8/7/2018     Description: 1.9% 10 year risk of ASCVD in 12/8/14; 1.6% on 3/23/16, 1.5% 10 year risk 3/28/2017;  2.1% 10 year risk.       • Osteoporosis 03/23/2016   • Tinnitus 02/01/2016   • Degeneration of intervertebral disc of mid-cervical region 02/01/2016       Current Outpatient Prescriptions on File Prior to Visit   Medication Sig Dispense Refill   • alendronate (FOSAMAX) 70 MG tablet TAKE 1 TABLET BY MOUTH ONCE WEEKLY 12 tablet 0   • baclofen (LIORESAL) 10 MG tablet Take by mouth 3 (three) times a day as needed. 1 to 2 tablets     • Calcium-Magnesium 200-50 MG tablet Take by mouth.     • Cholecalciferol (VITAMIN D3) 400 UNITS capsule Take by mouth.     • Cod  Liver Oil 10 MINIM capsule Take by mouth.     • diazepam (VALIUM) 5 MG tablet Take 1 tablet by mouth as needed.     • diclofenac (VOLTAREN) 1 % gel gel Apply 4 g topically 4 (Four) Times a Day As Needed.     • Efinaconazole 10 % solution Apply topically.     • Estriol powder      • Hormone Cream Base cream      • lidocaine (LIDODERM) 5 % Apply 1 patch topically as needed.     • metroNIDAZOLE (METROCREAM) 0.75 % cream   6   • misoprostol (CYTOTEC) 100 MCG tablet Take 100 mcg by mouth 2 (Two) Times a Day.     • Omega-3 Fatty Acids (FISH OIL PO) Take by mouth.     • oxyCODONE (ROXICODONE) 5 MG immediate release tablet Take 1 to 2 tablets every 4 to 6 hours as needed for pain     • Progesterone Micronized (PROGESTERONE, BULK,) powder Place on the skin.       • promethazine (PHENERGAN) 25 MG tablet   2   • traMADol (ULTRAM) 50 MG tablet Take by mouth.     • Urea 45 % gel Apply topically 2 (two) times a day. as directed Apply sparingly to the affected area(s)     • vitamin E 1000 UNIT capsule Take 1 capsule by mouth daily.       No current facility-administered medications on file prior to visit.        Objective     /74   Pulse 81   Wt 46.7 kg (103 lb)   SpO2 98%   BMI 18.25 kg/m²     Physical Exam   Constitutional: She is oriented to person, place, and time. She appears well-developed and well-nourished.   HENT:   Head: Normocephalic and atraumatic.   Right Ear: Hearing and tympanic membrane normal.   Left Ear: Hearing and tympanic membrane normal.   Pulmonary/Chest: Effort normal.   Neurological: She is alert and oriented to person, place, and time.   Psychiatric: She has a normal mood and affect. Her behavior is normal.       Assessment/Plan   Leonie was seen today for tinnitus.    Diagnoses and all orders for this visit:    Tinnitus of both ears    Bilateral hearing loss, unspecified hearing loss type    Anxiety  -     Ambulatory Referral to Psychiatry        Discussion    Patient presents with tinnitus and  bilateral hearing loss.  Tinnitus is making her very anxious.  Valium is helpful which she uses sparingly.  Discussed potential for addiction with this.  List of psychiatrists is given to make appointment.  10/15 minutes was spent in counseling of the following topics:, impressions, treatment options         Future Appointments  Date Time Provider Department Center   1/21/2019 10:00 AM [facility] None   1/25/2019 8:15 AM Rachael Oconnell MD [facility] None

## 2018-09-21 NOTE — PATIENT INSTRUCTIONS
Dr. Ricardo Bradshaw  [facility]   [address]   [address]   [place] [address]   Phone [phone]   Fax [phone]     Dr. Christopher Campos  [facility], [facility]   [address]   [address]   [place] [address]   Phone [phone]   Fax [phone]     Dr. Yuri Anderson  [address]   [address]   [place] [address]   Phone [phone]   Fax [phone]     Dr. Juan Diego Sue M.D.   [address]   [address]   [place] [address]   Phone [phone]   Fax [phone]     Dr. Bashir Ortiz  [address] [place] [address]   Phone [phone]   Fax [phone]     Dr. Flo Turner  [address] [place] [address]  [phone]    [facility]   Dr. Davis Marroquin  [address]  [place] [address]  [phone]    Dr. Maximo Schrader  [address] [place] [address]   Phone [phone]   Fax [phone]     Dr. Eliseo Brasher  [address] [place] [address]   Phone [phone]   Fax [phone]     Dr. Marty Ramos  [address]  [place] [address]  [phone]     Dr. Roderick Tinoco  [address]  [address]   [place] [address]   Phone [phone]   Fax [phone]     [facility]  [address] [place] [address]  [phone]    Dr. Aviva Duong  [address]   [address]   [place] [address]   Phone [phone]   Fax [phone]     Dr. Nemo Eisenberg  [address]  [place] [address]  [phone]    Dr. Saúl Quintero  [address] [place] [address]   Phone [phone]   Fax [phone]

## 2018-10-01 RX ORDER — ALENDRONATE SODIUM 70 MG/1
TABLET ORAL
Qty: 12 TABLET | Refills: 0 | Status: SHIPPED | OUTPATIENT
Start: 2018-10-01 | End: 2018-12-29 | Stop reason: SDUPTHER

## 2018-11-08 ENCOUNTER — APPOINTMENT (OUTPATIENT)
Dept: WOMENS IMAGING | Facility: HOSPITAL | Age: 58
End: 2018-11-08

## 2018-11-08 PROCEDURE — 77067 SCR MAMMO BI INCL CAD: CPT | Performed by: RADIOLOGY

## 2018-11-08 PROCEDURE — 77063 BREAST TOMOSYNTHESIS BI: CPT | Performed by: RADIOLOGY

## 2018-11-14 ENCOUNTER — OFFICE VISIT (OUTPATIENT)
Dept: INTERNAL MEDICINE | Facility: CLINIC | Age: 58
End: 2018-11-14

## 2018-11-14 VITALS
OXYGEN SATURATION: 100 % | WEIGHT: 107 LBS | DIASTOLIC BLOOD PRESSURE: 78 MMHG | BODY MASS INDEX: 18.95 KG/M2 | SYSTOLIC BLOOD PRESSURE: 110 MMHG | HEART RATE: 71 BPM

## 2018-11-14 DIAGNOSIS — R22.32 MASS OF LEFT FOREARM: ICD-10-CM

## 2018-11-14 DIAGNOSIS — M79.632 LEFT FOREARM PAIN: Primary | ICD-10-CM

## 2018-11-14 PROCEDURE — 99213 OFFICE O/P EST LOW 20 MIN: CPT | Performed by: INTERNAL MEDICINE

## 2018-11-14 NOTE — PROGRESS NOTES
Devonte Becker is a 58 y.o. female who presents with   Chief Complaint   Patient presents with   • Lump on Left Arm       History of Present Illness     Lump on the left forearm.  Tender.  Abrupt onset yesterday.      Review of Systems   Constitutional: Negative for fever.       The following portions of the patient's history were reviewed and updated as appropriate: allergies, current medications and problem list.    Patient Active Problem List    Diagnosis Date Noted   • Pelvic floor dysfunction 03/24/2016   • Atopic rhinitis 03/23/2016   • Neck pain 03/23/2016   • Chronic interstitial cystitis 03/23/2016     Note Last Updated: 3/23/2016     Description: Extreme case followed by urologist at  as well as Dr. Mendez Franz.     • Degeneration of intervertebral disc of cervical region 03/23/2016   • Hyperlipidemia 03/23/2016     Note Last Updated: 8/7/2018     Description: 1.9% 10 year risk of ASCVD in 12/8/14; 1.6% on 3/23/16, 1.5% 10 year risk 3/28/2017;  2.1% 10 year risk.       • Osteoporosis 03/23/2016   • Tinnitus 02/01/2016   • Degeneration of intervertebral disc of mid-cervical region 02/01/2016       Current Outpatient Medications on File Prior to Visit   Medication Sig Dispense Refill   • alendronate (FOSAMAX) 70 MG tablet TAKE 1 TABLET BY MOUTH ONCE WEEKLY 12 tablet 0   • baclofen (LIORESAL) 10 MG tablet Take by mouth 3 (three) times a day as needed. 1 to 2 tablets     • Calcium-Magnesium 200-50 MG tablet Take by mouth.     • Cholecalciferol (VITAMIN D3) 400 UNITS capsule Take by mouth.     • Cod Liver Oil 10 MINIM capsule Take by mouth.     • diazepam (VALIUM) 5 MG tablet Take 1 tablet by mouth as needed.     • diclofenac (VOLTAREN) 1 % gel gel Apply 4 g topically 4 (Four) Times a Day As Needed.     • Efinaconazole 10 % solution Apply topically.     • Estriol powder      • Hormone Cream Base cream      • lidocaine (LIDODERM) 5 % Apply 1 patch topically as needed.     • metroNIDAZOLE  (METROCREAM) 0.75 % cream   6   • misoprostol (CYTOTEC) 100 MCG tablet Take 100 mcg by mouth 2 (Two) Times a Day.     • Omega-3 Fatty Acids (FISH OIL PO) Take by mouth.     • oxyCODONE (ROXICODONE) 5 MG immediate release tablet Take 1 to 2 tablets every 4 to 6 hours as needed for pain     • Progesterone Micronized (PROGESTERONE, BULK,) powder Place on the skin.       • promethazine (PHENERGAN) 25 MG tablet   2   • traMADol (ULTRAM) 50 MG tablet Take by mouth.     • Urea 45 % gel Apply topically 2 (two) times a day. as directed Apply sparingly to the affected area(s)     • vitamin E 1000 UNIT capsule Take 1 capsule by mouth daily.       No current facility-administered medications on file prior to visit.        Objective     /78   Pulse 71   Wt 48.5 kg (107 lb)   SpO2 100%   BMI 18.95 kg/m²     Physical Exam   Constitutional: She is oriented to person, place, and time. She appears well-developed and well-nourished.   HENT:   Head: Normocephalic and atraumatic.   Pulmonary/Chest: Effort normal.   Musculoskeletal:   Focal circumferential swelling and induration of the lateral aspect of the left forearm without evidence of cellulitis.     Neurological: She is alert and oriented to person, place, and time.   Psychiatric: She has a normal mood and affect. Her behavior is normal.       Assessment/Plan   Leonie was seen today for lump on left arm.    Diagnoses and all orders for this visit:    Left forearm pain  -     Ambulatory Referral to Hand Surgery    Mass of left forearm  -     Ambulatory Referral to Hand Surgery        Discussion  Patient presents with focal swelling and tenderness of the left forearm possibly c/w tendonitis but without evidence of cellulitis or thrombophlebitis.  I discussed with the patient a trial of conservative management with:   tylenol, rest and ice.  Let me know if not feeling better over the next several weeks or if there is any change in symptoms.  We have also referred her to see  her hand doctor.           Future Appointments   Date Time Provider Department Center   1/21/2019 10:00 AM [facility] None   1/25/2019  8:15 AM Rachael Oconnell MD [facility] None

## 2018-12-31 RX ORDER — ALENDRONATE SODIUM 70 MG/1
TABLET ORAL
Qty: 12 TABLET | Refills: 0 | Status: SHIPPED | OUTPATIENT
Start: 2018-12-31 | End: 2019-03-22 | Stop reason: SDUPTHER

## 2019-01-18 DIAGNOSIS — Z00.00 HEALTH CARE MAINTENANCE: Primary | ICD-10-CM

## 2019-01-22 LAB
25(OH)D3+25(OH)D2 SERPL-MCNC: 41.9 NG/ML (ref 30–100)
ALBUMIN SERPL-MCNC: 4.5 G/DL (ref 3.5–5.2)
ALBUMIN/GLOB SERPL: 1.6 G/DL
ALP SERPL-CCNC: 66 U/L (ref 39–117)
ALT SERPL-CCNC: 19 U/L (ref 1–33)
APPEARANCE UR: CLEAR
AST SERPL-CCNC: 24 U/L (ref 1–32)
BACTERIA #/AREA URNS HPF: ABNORMAL /HPF
BASOPHILS # BLD AUTO: 0.04 10*3/MM3 (ref 0–0.2)
BASOPHILS NFR BLD AUTO: 1 % (ref 0–1.5)
BILIRUB SERPL-MCNC: 0.2 MG/DL (ref 0.1–1.2)
BILIRUB UR QL STRIP: NEGATIVE
BUN SERPL-MCNC: 15 MG/DL (ref 8–23)
BUN/CREAT SERPL: 16.1 (ref 7–25)
CALCIUM SERPL-MCNC: 9.4 MG/DL (ref 8.6–10.5)
CASTS URNS MICRO: ABNORMAL
CASTS URNS QL MICRO: PRESENT /LPF
CHLORIDE SERPL-SCNC: 103 MMOL/L (ref 98–107)
CHOLEST SERPL-MCNC: 269 MG/DL (ref 0–200)
CO2 SERPL-SCNC: 25 MMOL/L (ref 22–29)
COLOR UR: YELLOW
CREAT SERPL-MCNC: 0.93 MG/DL (ref 0.57–1)
EOSINOPHIL # BLD AUTO: 0.19 10*3/MM3 (ref 0–0.7)
EOSINOPHIL NFR BLD AUTO: 4.8 % (ref 0.3–6.2)
EPI CELLS #/AREA URNS HPF: ABNORMAL /HPF
ERYTHROCYTE [DISTWIDTH] IN BLOOD BY AUTOMATED COUNT: 13.3 % (ref 11.7–13)
GLOBULIN SER CALC-MCNC: 2.9 GM/DL
GLUCOSE SERPL-MCNC: 91 MG/DL (ref 65–99)
GLUCOSE UR QL: NEGATIVE
HCT VFR BLD AUTO: 43.8 % (ref 35.6–45.5)
HDLC SERPL-MCNC: 93 MG/DL (ref 40–60)
HGB BLD-MCNC: 13.8 G/DL (ref 11.9–15.5)
HGB UR QL STRIP: NEGATIVE
IMM GRANULOCYTES # BLD AUTO: 0 10*3/MM3 (ref 0–0.03)
IMM GRANULOCYTES NFR BLD AUTO: 0 % (ref 0–0.5)
KETONES UR QL STRIP: NEGATIVE
LDLC SERPL CALC-MCNC: 163 MG/DL (ref 0–100)
LEUKOCYTE ESTERASE UR QL STRIP: NEGATIVE
LYMPHOCYTES # BLD AUTO: 0.84 10*3/MM3 (ref 0.9–4.8)
LYMPHOCYTES NFR BLD AUTO: 21.4 % (ref 19.6–45.3)
MCH RBC QN AUTO: 29.8 PG (ref 26.9–32)
MCHC RBC AUTO-ENTMCNC: 31.5 G/DL (ref 32.4–36.3)
MCV RBC AUTO: 94.6 FL (ref 80.5–98.2)
MICRO URNS: NORMAL
MICRO URNS: NORMAL
MONOCYTES # BLD AUTO: 0.44 10*3/MM3 (ref 0.2–1.2)
MONOCYTES NFR BLD AUTO: 11.2 % (ref 5–12)
MUCOUS THREADS URNS QL MICRO: PRESENT /HPF
NEUTROPHILS # BLD AUTO: 2.42 10*3/MM3 (ref 1.9–8.1)
NEUTROPHILS NFR BLD AUTO: 61.6 % (ref 42.7–76)
NITRITE UR QL STRIP: NEGATIVE
PH UR STRIP: 5.5 [PH] (ref 5–7.5)
PLATELET # BLD AUTO: 231 10*3/MM3 (ref 140–500)
POTASSIUM SERPL-SCNC: 4 MMOL/L (ref 3.5–5.2)
PROT SERPL-MCNC: 7.4 G/DL (ref 6–8.5)
PROT UR QL STRIP: NEGATIVE
RBC # BLD AUTO: 4.63 10*6/MM3 (ref 3.9–5.2)
RBC #/AREA URNS HPF: ABNORMAL /HPF
SODIUM SERPL-SCNC: 141 MMOL/L (ref 136–145)
SP GR UR: 1.02 (ref 1–1.03)
T4 FREE SERPL-MCNC: 1.06 NG/DL (ref 0.93–1.7)
TRIGL SERPL-MCNC: 65 MG/DL (ref 0–150)
TSH SERPL DL<=0.005 MIU/L-ACNC: 6.66 MIU/ML (ref 0.27–4.2)
URINALYSIS REFLEX: NORMAL
UROBILINOGEN UR STRIP-MCNC: 0.2 MG/DL (ref 0.2–1)
VLDLC SERPL CALC-MCNC: 13 MG/DL (ref 5–40)
WBC # BLD AUTO: 3.93 10*3/MM3 (ref 4.5–10.7)
WBC #/AREA URNS HPF: ABNORMAL /HPF

## 2019-01-25 ENCOUNTER — OFFICE VISIT (OUTPATIENT)
Dept: INTERNAL MEDICINE | Facility: CLINIC | Age: 59
End: 2019-01-25

## 2019-01-25 VITALS
WEIGHT: 107 LBS | HEIGHT: 63 IN | OXYGEN SATURATION: 98 % | DIASTOLIC BLOOD PRESSURE: 72 MMHG | BODY MASS INDEX: 18.96 KG/M2 | SYSTOLIC BLOOD PRESSURE: 114 MMHG | HEART RATE: 68 BPM

## 2019-01-25 DIAGNOSIS — Z13.6 ENCOUNTER FOR SCREENING FOR VASCULAR DISEASE: ICD-10-CM

## 2019-01-25 DIAGNOSIS — E78.5 HYPERLIPIDEMIA, UNSPECIFIED HYPERLIPIDEMIA TYPE: ICD-10-CM

## 2019-01-25 DIAGNOSIS — Z00.00 MEDICARE ANNUAL WELLNESS VISIT, SUBSEQUENT: Primary | ICD-10-CM

## 2019-01-25 DIAGNOSIS — E03.8 SUBCLINICAL HYPOTHYROIDISM: ICD-10-CM

## 2019-01-25 DIAGNOSIS — M81.0 OSTEOPOROSIS, UNSPECIFIED OSTEOPOROSIS TYPE, UNSPECIFIED PATHOLOGICAL FRACTURE PRESENCE: ICD-10-CM

## 2019-01-25 PROCEDURE — 99213 OFFICE O/P EST LOW 20 MIN: CPT | Performed by: INTERNAL MEDICINE

## 2019-01-25 PROCEDURE — 93000 ELECTROCARDIOGRAM COMPLETE: CPT | Performed by: INTERNAL MEDICINE

## 2019-01-25 PROCEDURE — G0439 PPPS, SUBSEQ VISIT: HCPCS | Performed by: INTERNAL MEDICINE

## 2019-01-25 NOTE — PROGRESS NOTES
Devonte Becker is a 58 y.o. female who presents for an annual wellness visit as well as check up of hld, op.      History of Present Illness     HLD.  Discussed labs.  Moderate increase.  She trys to follow a healthy diet.  She does not want med. She has no vascular disease or CAD by screens.  OP.  She is maintained on Fosamax.   Abnormal TSH.  She is subclinical without symptoms.    Review of Systems   Respiratory: Negative.    Cardiovascular: Negative.        The following portions of the patient's history were reviewed and updated as appropriate: allergies, current medications, past family history, past medical history, past social history, past surgical history and problem list.  Health maintenance tab was reviewed and updated with the patient.       Patient Active Problem List    Diagnosis Date Noted   • Subclinical hypothyroidism 01/25/2019   • Pelvic floor dysfunction 03/24/2016   • Atopic rhinitis 03/23/2016   • Neck pain 03/23/2016   • Chronic interstitial cystitis 03/23/2016     Note Last Updated: 3/23/2016     Description: Extreme case followed by urologist at  as well as Dr. Mendez Franz.     • Degeneration of intervertebral disc of cervical region 03/23/2016   • Hyperlipidemia 03/23/2016     Note Last Updated: 8/7/2018     Description: 1.9% 10 year risk of ASCVD in 12/8/14; 1.6% on 3/23/16, 1.5% 10 year risk 3/28/2017;  2.1% 10 year risk.       • Osteoporosis 03/23/2016     Note Last Updated: 1/25/2019     Start in 4/2017     • Tinnitus 02/01/2016   • Degeneration of intervertebral disc of mid-cervical region 02/01/2016       Past Medical History:   Diagnosis Date   • Abnormal blood smear    • Abnormal liver function test    • Back pain    • Blood tests for routine general physical examination    • Facial basal cell cancer    • Limb pain    • Rosacea        Past Surgical History:   Procedure Laterality Date   • CYSTOSCOPY      Diagnostic   • KNEE SURGERY         Family History    Problem Relation Age of Onset   • Hypertension Mother         benign essential   • Diabetes Mother    • Heart disease Mother    • Thyroid disease Mother    • Aortic aneurysm Father         Thoracic   • Glaucoma Father    • Depression Sister    • Thyroid disease Sister        Social History     Socioeconomic History   • Marital status: Single     Spouse name: Not on file   • Number of children: Not on file   • Years of education: Not on file   • Highest education level: Not on file   Social Needs   • Financial resource strain: Not on file   • Food insecurity - worry: Not on file   • Food insecurity - inability: Not on file   • Transportation needs - medical: Not on file   • Transportation needs - non-medical: Not on file   Occupational History   • Not on file   Tobacco Use   • Smoking status: Former Smoker     Years: 2.00     Start date:      Last attempt to quit:      Years since quittin.0   • Smokeless tobacco: Never Used   Substance and Sexual Activity   • Alcohol use: No   • Drug use: No   • Sexual activity: Not on file   Other Topics Concern   • Not on file   Social History Narrative   • Not on file       Current Outpatient Medications on File Prior to Visit   Medication Sig Dispense Refill   • alendronate (FOSAMAX) 70 MG tablet TAKE 1 TABLET BY MOUTH ONCE WEEKLY 12 tablet 0   • baclofen (LIORESAL) 10 MG tablet Take by mouth 3 (three) times a day as needed. 1 to 2 tablets     • Calcium-Magnesium 200-50 MG tablet Take by mouth.     • Cholecalciferol (VITAMIN D3) 400 UNITS capsule Take by mouth.     • Cod Liver Oil 10 MINIM capsule Take by mouth.     • diazepam (VALIUM) 5 MG tablet Take 1 tablet by mouth as needed.     • diclofenac (VOLTAREN) 1 % gel gel Apply 4 g topically 4 (Four) Times a Day As Needed.     • Efinaconazole 10 % solution Apply topically.     • Estriol powder      • Hormone Cream Base cream      • lidocaine (LIDODERM) 5 % Apply 1 patch topically as needed.     • metroNIDAZOLE  "(METROCREAM) 0.75 % cream   6   • misoprostol (CYTOTEC) 100 MCG tablet Take 100 mcg by mouth 2 (Two) Times a Day.     • Omega-3 Fatty Acids (FISH OIL PO) Take by mouth.     • oxyCODONE (ROXICODONE) 5 MG immediate release tablet Take 1 to 2 tablets every 4 to 6 hours as needed for pain     • Progesterone Micronized (PROGESTERONE, BULK,) powder Place on the skin.       • promethazine (PHENERGAN) 25 MG tablet   2   • traMADol (ULTRAM) 50 MG tablet Take by mouth.     • Urea 45 % gel Apply topically 2 (two) times a day. as directed Apply sparingly to the affected area(s)     • vitamin E 1000 UNIT capsule Take 1 capsule by mouth daily.       No current facility-administered medications on file prior to visit.        No Known Allergies    Immunization History   Administered Date(s) Administered   • Flu Mist 10/08/2015   • Flu Vaccine Intradermal Quad 18-64YR 09/30/2018   • Influenza, Unspecified 09/01/2018   • Tdap 10/01/2006, 04/24/2017       Objective     /72   Pulse 68   Ht 160 cm (62.99\")   Wt 48.5 kg (107 lb)   SpO2 98%   BMI 18.96 kg/m²     Physical Exam   Constitutional: She is oriented to person, place, and time. She appears well-developed and well-nourished.   HENT:   Head: Normocephalic and atraumatic.   Right Ear: Hearing, tympanic membrane and external ear normal.   Left Ear: Hearing, tympanic membrane and external ear normal.   Nose: Nose normal.   Mouth/Throat: Oropharynx is clear and moist.   Neck: Neck supple. No thyromegaly present.   Cardiovascular: Normal rate, regular rhythm and normal heart sounds.   No murmur heard.  Pulmonary/Chest: Effort normal and breath sounds normal. Right breast exhibits no mass. Left breast exhibits no mass.   Abdominal: Soft. She exhibits no distension. There is no hepatosplenomegaly. There is no tenderness.   Genitourinary: No breast tenderness.   Lymphadenopathy:     She has no cervical adenopathy.   Neurological: She is alert and oriented to person, place, and " time.   Skin: Skin is warm and dry.   Psychiatric: She has a normal mood and affect. Her speech is normal and behavior is normal. Judgment and thought content normal. Cognition and memory are normal.          ECG 12 Lead  Date/Time: 1/25/2019 8:44 AM  Performed by: Rachael Oconnell MD  Authorized by: Rachael Oconnell MD   Comparison: compared with previous ECG   Rhythm: sinus rhythm  Rate: normal  Conduction: conduction normal  ST Segments: ST segments normal  QRS axis: normal  Clinical impression: normal ECG              Assessment/Plan   Leonie was seen today for annual exam.    Diagnoses and all orders for this visit:    Medicare annual wellness visit, subsequent    Hyperlipidemia, unspecified hyperlipidemia type  -     ECG 12 Lead    Subclinical hypothyroidism    Osteoporosis, unspecified osteoporosis type, unspecified pathological fracture presence    Encounter for screening for vascular disease  -     Vascular Screening (Bundle) CAR; Future        Discussion    AWV.  See scanned forms and/or computer for kannan history, PHQ-9, functional ability questionnaire, cognitive impairment screening.  Direct observation of cognitive abilities:  The patient does not exhibit any impairment in cognitive abilities upon direct observation at today's visit.   These were all reviewed with the patient and the patient was provided with a personal prevention plan of service in patient instructions.  Patient was given advice or information on the following topics:  nutrition, exercise  HLD.  We discussed diet.  I recommend a diet high in fruits, vegetables, whole grains, lean meats, nuts and beans.  I recommend limiting red meat, full fat dairy, eggs and processed white carbohydrates.  I recommend aerobic exercise at least 3 days per week.   OP.  I recommend to get 1200 mg of calcium and 1000 IUs of vitamin D through diet and supplements and to participate in a weight based exercise to prevent loss of bone mineral density. Bone  mineral will be monitored every two years.    Continue Fosamax.    Subclinical hypothyroidism.  Continue to monitor.    I have recommended that the patient get the following immunizations:  Shingrix and hepatitis A.        Health Maintenance   Topic Date Due   • ZOSTER VACCINE (1 of 2) 07/10/2008   • MEDICARE ANNUAL WELLNESS  03/28/2018   • DXA SCAN  04/13/2019   • MAMMOGRAM  11/07/2019   • LIPID PANEL  01/21/2020   • PAP SMEAR  10/05/2020   • COLONOSCOPY  09/10/2024   • TDAP/TD VACCINES (3 - Td) 04/24/2027   • HEPATITIS C SCREENING  Completed   • INFLUENZA VACCINE  Completed            Future Appointments   Date Time Provider Department Center   1/24/2020 11:00 AM [facility] None   1/28/2020 11:15 AM Rachael Oconnell MD [facility] None

## 2019-01-25 NOTE — PATIENT INSTRUCTIONS
Medicare Wellness  Personal Prevention Plan of Service     Date of Office Visit:  2019  Encounter Provider:  Rachael Oconnell MD  Place of Service:  [facility]  Patient Name: Leonie Becker  :  1960    As part of the Medicare Wellness portion of your visit today, we are providing you with this personalized preventive plan of services (PPPS). This plan is based upon recommendations of the [place] Preventive Services Task Force (USPSTF) and the Advisory Committee on Immunization Practices (ACIP).    This lists the preventive care services that should be considered, and provides dates of when you are due. Items listed as completed are up-to-date and do not require any further intervention.    Health Maintenance   Topic Date Due   • ZOSTER VACCINE (1 of 2) 07/10/2008   • MEDICARE ANNUAL WELLNESS  2018   • DXA SCAN  2019   • MAMMOGRAM  2019   • LIPID PANEL  2020   • PAP SMEAR  10/05/2020   • COLONOSCOPY  09/10/2024   • TDAP/TD VACCINES (3 - Td) 2027   • HEPATITIS C SCREENING  Completed   • INFLUENZA VACCINE  Completed       Orders Placed This Encounter   Procedures   • ECG 12 Lead     This order was created via procedure documentation       Return in about 1 year (around 2020).

## 2019-02-07 ENCOUNTER — HOSPITAL ENCOUNTER (OUTPATIENT)
Dept: CARDIOLOGY | Facility: HOSPITAL | Age: 59
Discharge: HOME | End: 2019-02-07
Admitting: INTERNAL MEDICINE

## 2019-02-07 VITALS
BODY MASS INDEX: 18.61 KG/M2 | DIASTOLIC BLOOD PRESSURE: 55 MMHG | WEIGHT: 105 LBS | HEIGHT: 63 IN | SYSTOLIC BLOOD PRESSURE: 110 MMHG | HEART RATE: 62 BPM

## 2019-02-07 DIAGNOSIS — Z13.6 ENCOUNTER FOR SCREENING FOR VASCULAR DISEASE: ICD-10-CM

## 2019-02-07 LAB
BH CV ECHO MEAS - DIST AO DIAM: 1.46 CM
BH CV VAS BP LEFT ARM: NORMAL MMHG
BH CV VAS BP RIGHT ARM: NORMAL MMHG
BH CV XLRA MEAS - MID AO DIAM: 1.63 CM
BH CV XLRA MEAS - PAD LEFT ABI DP: 1.24
BH CV XLRA MEAS - PAD LEFT ABI PT: 1.2
BH CV XLRA MEAS - PAD LEFT ARM: 91 MMHG
BH CV XLRA MEAS - PAD LEFT LEG DP: 124 MMHG
BH CV XLRA MEAS - PAD LEFT LEG PT: 120 MMHG
BH CV XLRA MEAS - PAD RIGHT ABI DP: 1.2
BH CV XLRA MEAS - PAD RIGHT ABI PT: 1.24
BH CV XLRA MEAS - PAD RIGHT ARM: 100 MMHG
BH CV XLRA MEAS - PAD RIGHT LEG DP: 120 MMHG
BH CV XLRA MEAS - PAD RIGHT LEG PT: 124 MMHG
BH CV XLRA MEAS - PROX AO DIAM: 1.73 CM
BH CV XLRA MEAS LEFT ICA/CCA RATIO: 1.23
BH CV XLRA MEAS LEFT MID CCA PSV: NORMAL CM/SEC
BH CV XLRA MEAS LEFT MID ICA PSV: NORMAL CM/SEC
BH CV XLRA MEAS LEFT PROX ECA PSV: NORMAL CM/SEC
BH CV XLRA MEAS RIGHT ICA/CCA RATIO: 1.47
BH CV XLRA MEAS RIGHT MID CCA PSV: NORMAL CM/SEC
BH CV XLRA MEAS RIGHT MID ICA PSV: NORMAL CM/SEC
BH CV XLRA MEAS RIGHT PROX ECA PSV: NORMAL CM/SEC

## 2019-02-07 PROCEDURE — 93799 UNLISTED CV SVC/PROCEDURE: CPT

## 2019-02-08 ENCOUNTER — TELEPHONE (OUTPATIENT)
Dept: INTERNAL MEDICINE | Facility: CLINIC | Age: 59
End: 2019-02-08

## 2019-02-08 PROBLEM — E03.9 ACQUIRED HYPOTHYROIDISM: Status: ACTIVE | Noted: 2019-02-08

## 2019-02-08 RX ORDER — LEVOTHYROXINE SODIUM 0.1 MG/1
100 TABLET ORAL DAILY
Qty: 30 TABLET | Refills: 1 | Status: SHIPPED | OUTPATIENT
Start: 2019-02-08 | End: 2019-02-08 | Stop reason: SDUPTHER

## 2019-02-08 RX ORDER — LEVOTHYROXINE SODIUM 0.05 MG/1
50 TABLET ORAL DAILY
Qty: 30 TABLET | Refills: 1 | Status: SHIPPED | OUTPATIENT
Start: 2019-02-08 | End: 2019-06-14

## 2019-02-08 NOTE — TELEPHONE ENCOUNTER
She wanted to inform you that she was looking on line and that she is cold all the time and that is a sign of low tsh. CLC    I can start her on thyroid replacment and plan recheck eight weeks.  Let me know if agreeable.  SLW    Patient advised, okay to send in script. CLC

## 2019-03-22 RX ORDER — ALENDRONATE SODIUM 70 MG/1
TABLET ORAL
Qty: 12 TABLET | Refills: 0 | Status: SHIPPED | OUTPATIENT
Start: 2019-03-22 | End: 2019-06-18 | Stop reason: SDUPTHER

## 2019-05-01 ENCOUNTER — TELEPHONE (OUTPATIENT)
Dept: INTERNAL MEDICINE | Facility: CLINIC | Age: 59
End: 2019-05-01

## 2019-05-01 NOTE — TELEPHONE ENCOUNTER
She had a shingrx and is having some Dizziness and nausea, that has gotten better. It seems it has increased the ringing in her ears. I advised her if it is from the injection it will more then likely resolve in a few days. Wants to know what you think. CLC    Yes, give it a few days.  Take it easy.  See me if symptoms not improving.  SLW    Patient advised. CLC

## 2019-06-10 ENCOUNTER — TELEPHONE (OUTPATIENT)
Dept: INTERNAL MEDICINE | Facility: CLINIC | Age: 59
End: 2019-06-10

## 2019-06-10 NOTE — TELEPHONE ENCOUNTER
She needs a order to [facility] for a hear test.   Fax: [phone]  CC    I wrote Rx.  SLW    Patient advised order faxed. CLC

## 2019-06-14 ENCOUNTER — OFFICE VISIT (OUTPATIENT)
Dept: INTERNAL MEDICINE | Facility: CLINIC | Age: 59
End: 2019-06-14

## 2019-06-14 VITALS
WEIGHT: 108 LBS | BODY MASS INDEX: 19.14 KG/M2 | DIASTOLIC BLOOD PRESSURE: 74 MMHG | HEART RATE: 79 BPM | SYSTOLIC BLOOD PRESSURE: 100 MMHG | OXYGEN SATURATION: 98 % | HEIGHT: 63 IN

## 2019-06-14 DIAGNOSIS — T50.Z95A VACCINATION REACTION, INITIAL ENCOUNTER: ICD-10-CM

## 2019-06-14 DIAGNOSIS — H93.13 TINNITUS OF BOTH EARS: Primary | ICD-10-CM

## 2019-06-14 PROBLEM — E03.8 SUBCLINICAL HYPOTHYROIDISM: Status: RESOLVED | Noted: 2019-01-25 | Resolved: 2019-06-14

## 2019-06-14 PROCEDURE — 99213 OFFICE O/P EST LOW 20 MIN: CPT | Performed by: INTERNAL MEDICINE

## 2019-06-14 RX ORDER — MISOPROSTOL 100 UG/1
100 TABLET ORAL DAILY
COMMUNITY
Start: 2017-09-26 | End: 2019-06-14

## 2019-06-14 NOTE — PROGRESS NOTES
Devonte Becker is a 58 y.o. female who presents with   Chief Complaint   Patient presents with   • Shingrx Reaction       History of Present Illness     Patient received Shingrix shot on April 30th.  She started to feel poorly one hour later.  She says her tinnitus got to feeling worse.  She was nauseated, dizziness, achy, arm pain.  All symptoms went back to baseline after nine days except for her severe tinnitus.  She had reported it to the CDC.      Review of Systems   Constitutional: Positive for fatigue.   Genitourinary: Positive for difficulty urinating.       The following portions of the patient's history were reviewed and updated as appropriate: allergies, current medications and problem list.    Patient Active Problem List    Diagnosis Date Noted   • Acquired hypothyroidism 02/08/2019   • Pelvic floor dysfunction 03/24/2016   • Atopic rhinitis 03/23/2016   • Neck pain 03/23/2016   • Chronic interstitial cystitis 03/23/2016     Note Last Updated: 3/23/2016     Description: Extreme case followed by urologist at  as well as Dr. Mendez Franz.     • Degeneration of intervertebral disc of cervical region 03/23/2016   • Hyperlipidemia 03/23/2016     Note Last Updated: 8/7/2018     Description: 1.9% 10 year risk of ASCVD in 12/8/14; 1.6% on 3/23/16, 1.5% 10 year risk 3/28/2017;  2.1% 10 year risk.       • Osteoporosis 03/23/2016     Note Last Updated: 1/25/2019     Start in 4/2017     • Tinnitus 02/01/2016   • Degeneration of intervertebral disc of mid-cervical region 02/01/2016       Current Outpatient Medications on File Prior to Visit   Medication Sig Dispense Refill   • alendronate (FOSAMAX) 70 MG tablet TAKE 1 TABLET BY MOUTH ONCE WEEKLY 12 tablet 0   • baclofen (LIORESAL) 10 MG tablet Take by mouth 3 (three) times a day as needed. 1 to 2 tablets     • Cannabidiol 100 MG/ML solution cannabidiol (CBD) 100 mg/mL oral solution   Take by oral route.     • Cholecalciferol (VITAMIN D3) 400 UNITS  "capsule Take by mouth.     • Cod Liver Oil 10 MINIM capsule Take by mouth.     • diazepam (VALIUM) 5 MG tablet Take 1 tablet by mouth as needed.     • Estriol powder      • Hormone Cream Base cream      • lidocaine (LIDODERM) 5 % Apply 1 patch topically as needed.     • metroNIDAZOLE (METROCREAM) 0.75 % cream   6   • Omega-3 Fatty Acids (FISH OIL PO) Take by mouth.     • oxyCODONE (ROXICODONE) 5 MG immediate release tablet Take 1 to 2 tablets every 4 to 6 hours as needed for pain     • Progesterone Micronized (PROGESTERONE, BULK,) powder Place on the skin.       • promethazine (PHENERGAN) 25 MG tablet   2   • traMADol (ULTRAM) 50 MG tablet Take by mouth.     • [DISCONTINUED] diclofenac (VOLTAREN) 1 % gel gel Apply 4 g topically 4 (Four) Times a Day As Needed.     • [DISCONTINUED] Efinaconazole 10 % solution Apply topically.     • [DISCONTINUED] levothyroxine (SYNTHROID, LEVOTHROID) 50 MCG tablet Take 1 tablet by mouth Daily. 30 tablet 1   • [DISCONTINUED] lidocaine (UROJET) 2 % jelly 10 mL Every 12 (Twelve) Hours As Needed.     • [DISCONTINUED] metroNIDAZOLE (METROCREAM) 0.75 % cream metronidazole 0.75 % topical cream     • [DISCONTINUED] misoprostol (CYTOTEC) 100 MCG tablet Take 100 mcg by mouth Daily.     • [DISCONTINUED] Urea 45 % gel Apply topically 2 (two) times a day. as directed Apply sparingly to the affected area(s)       No current facility-administered medications on file prior to visit.        Objective     /74   Pulse 79   Ht 160 cm (62.99\")   Wt 49 kg (108 lb)   SpO2 98%   BMI 19.14 kg/m²     Physical Exam   Constitutional: She is oriented to person, place, and time. She appears well-developed and well-nourished.   HENT:   Head: Normocephalic and atraumatic.   Pulmonary/Chest: Effort normal.   Neurological: She is alert and oriented to person, place, and time.   Psychiatric: She has a normal mood and affect. Her behavior is normal.       Assessment/Plan   Leonie was seen today for shingrx " reaction.    Diagnoses and all orders for this visit:    Tinnitus of both ears  -     Ambulatory Referral to ENT (Otolaryngology)    Vaccination reaction, initial encounter  -     Ambulatory Referral to ENT (Otolaryngology)        Discussion      Patient presents with worsening tinnitus which seems to be have been exacerbated by recent Shingrix shot.  Patient requests second opinion ENT.  Referral is placed.  She has reported reaction to the CDC.  10/15 minutes was spent in counseling of the following topics:, impressions         Future Appointments   Date Time Provider Department Center   1/24/2020 11:00 AM [facility] None   1/28/2020 11:15 AM Rachael Oconnell MD [facility] None

## 2019-06-18 RX ORDER — ALENDRONATE SODIUM 70 MG/1
TABLET ORAL
Qty: 12 TABLET | Refills: 0 | Status: SHIPPED | OUTPATIENT
Start: 2019-06-18 | End: 2019-11-27 | Stop reason: SDUPTHER

## 2019-11-14 ENCOUNTER — APPOINTMENT (OUTPATIENT)
Dept: WOMENS IMAGING | Facility: HOSPITAL | Age: 59
End: 2019-11-14

## 2019-11-14 PROCEDURE — 77067 SCR MAMMO BI INCL CAD: CPT | Performed by: RADIOLOGY

## 2019-11-14 PROCEDURE — 77063 BREAST TOMOSYNTHESIS BI: CPT | Performed by: RADIOLOGY

## 2019-12-02 RX ORDER — ALENDRONATE SODIUM 70 MG/1
TABLET ORAL
Qty: 12 TABLET | Refills: 0 | Status: SHIPPED | OUTPATIENT
Start: 2019-12-02 | End: 2020-02-25

## 2020-01-23 DIAGNOSIS — Z00.00 HEALTH CARE MAINTENANCE: Primary | ICD-10-CM

## 2020-01-26 LAB
ALBUMIN SERPL-MCNC: 4.4 G/DL (ref 3.5–5.2)
ALBUMIN/GLOB SERPL: 1.9 G/DL
ALP SERPL-CCNC: 68 U/L (ref 39–117)
ALT SERPL-CCNC: 21 U/L (ref 1–33)
APPEARANCE UR: ABNORMAL
AST SERPL-CCNC: 22 U/L (ref 1–32)
BACTERIA #/AREA URNS HPF: ABNORMAL /HPF
BACTERIA UR CULT: NORMAL
BACTERIA UR CULT: NORMAL
BASOPHILS # BLD AUTO: 0.04 10*3/MM3 (ref 0–0.2)
BASOPHILS NFR BLD AUTO: 1.1 % (ref 0–1.5)
BILIRUB SERPL-MCNC: 0.4 MG/DL (ref 0.2–1.2)
BILIRUB UR QL STRIP: NEGATIVE
BUN SERPL-MCNC: 15 MG/DL (ref 8–23)
BUN/CREAT SERPL: 14.3 (ref 7–25)
CALCIUM SERPL-MCNC: 9.5 MG/DL (ref 8.6–10.5)
CHLORIDE SERPL-SCNC: 104 MMOL/L (ref 98–107)
CHOLEST SERPL-MCNC: 266 MG/DL (ref 0–200)
CO2 SERPL-SCNC: 26.6 MMOL/L (ref 22–29)
COLOR UR: YELLOW
CREAT SERPL-MCNC: 1.05 MG/DL (ref 0.57–1)
EOSINOPHIL # BLD AUTO: 0.13 10*3/MM3 (ref 0–0.4)
EOSINOPHIL NFR BLD AUTO: 3.6 % (ref 0.3–6.2)
EPI CELLS #/AREA URNS HPF: >10 /HPF (ref 0–10)
ERYTHROCYTE [DISTWIDTH] IN BLOOD BY AUTOMATED COUNT: 12.4 % (ref 12.3–15.4)
GLOBULIN SER CALC-MCNC: 2.3 GM/DL
GLUCOSE SERPL-MCNC: 90 MG/DL (ref 65–99)
GLUCOSE UR QL: NEGATIVE
HCT VFR BLD AUTO: 42.7 % (ref 34–46.6)
HDLC SERPL-MCNC: 83 MG/DL (ref 40–60)
HGB BLD-MCNC: 13.7 G/DL (ref 12–15.9)
HGB UR QL STRIP: NEGATIVE
IMM GRANULOCYTES # BLD AUTO: 0.01 10*3/MM3 (ref 0–0.05)
IMM GRANULOCYTES NFR BLD AUTO: 0.3 % (ref 0–0.5)
KETONES UR QL STRIP: NEGATIVE
LDLC SERPL CALC-MCNC: 166 MG/DL (ref 0–100)
LEUKOCYTE ESTERASE UR QL STRIP: ABNORMAL
LYMPHOCYTES # BLD AUTO: 1.11 10*3/MM3 (ref 0.7–3.1)
LYMPHOCYTES NFR BLD AUTO: 31.1 % (ref 19.6–45.3)
MCH RBC QN AUTO: 28.8 PG (ref 26.6–33)
MCHC RBC AUTO-ENTMCNC: 32.1 G/DL (ref 31.5–35.7)
MCV RBC AUTO: 89.7 FL (ref 79–97)
MICRO URNS: ABNORMAL
MONOCYTES # BLD AUTO: 0.38 10*3/MM3 (ref 0.1–0.9)
MONOCYTES NFR BLD AUTO: 10.6 % (ref 5–12)
MUCOUS THREADS URNS QL MICRO: PRESENT /HPF
NEUTROPHILS # BLD AUTO: 1.9 10*3/MM3 (ref 1.7–7)
NEUTROPHILS NFR BLD AUTO: 53.3 % (ref 42.7–76)
NITRITE UR QL STRIP: NEGATIVE
NRBC BLD AUTO-RTO: 0 /100 WBC (ref 0–0.2)
PH UR STRIP: 5 [PH] (ref 5–7.5)
PLATELET # BLD AUTO: 235 10*3/MM3 (ref 140–450)
POTASSIUM SERPL-SCNC: 4.1 MMOL/L (ref 3.5–5.2)
PROT SERPL-MCNC: 6.7 G/DL (ref 6–8.5)
PROT UR QL STRIP: NEGATIVE
RBC # BLD AUTO: 4.76 10*6/MM3 (ref 3.77–5.28)
RBC #/AREA URNS HPF: ABNORMAL /HPF (ref 0–2)
SODIUM SERPL-SCNC: 142 MMOL/L (ref 136–145)
SP GR UR: 1.03 (ref 1–1.03)
TRIGL SERPL-MCNC: 87 MG/DL (ref 0–150)
TSH SERPL DL<=0.005 MIU/L-ACNC: 3.89 UIU/ML (ref 0.27–4.2)
URINALYSIS REFLEX: ABNORMAL
UROBILINOGEN UR STRIP-MCNC: 0.2 MG/DL (ref 0.2–1)
VLDLC SERPL CALC-MCNC: 17.4 MG/DL
WBC # BLD AUTO: 3.57 10*3/MM3 (ref 3.4–10.8)
WBC #/AREA URNS HPF: ABNORMAL /HPF (ref 0–5)

## 2020-01-28 ENCOUNTER — OFFICE VISIT (OUTPATIENT)
Dept: INTERNAL MEDICINE | Facility: CLINIC | Age: 60
End: 2020-01-28

## 2020-01-28 VITALS
SYSTOLIC BLOOD PRESSURE: 114 MMHG | BODY MASS INDEX: 19.49 KG/M2 | OXYGEN SATURATION: 98 % | HEART RATE: 72 BPM | DIASTOLIC BLOOD PRESSURE: 74 MMHG | WEIGHT: 110 LBS | HEIGHT: 63 IN

## 2020-01-28 DIAGNOSIS — Z00.00 MEDICARE ANNUAL WELLNESS VISIT, SUBSEQUENT: Primary | ICD-10-CM

## 2020-01-28 DIAGNOSIS — E78.5 HYPERLIPIDEMIA, UNSPECIFIED HYPERLIPIDEMIA TYPE: ICD-10-CM

## 2020-01-28 DIAGNOSIS — M81.0 OSTEOPOROSIS, UNSPECIFIED OSTEOPOROSIS TYPE, UNSPECIFIED PATHOLOGICAL FRACTURE PRESENCE: ICD-10-CM

## 2020-01-28 DIAGNOSIS — N30.10 CHRONIC INTERSTITIAL CYSTITIS: ICD-10-CM

## 2020-01-28 DIAGNOSIS — M25.50 ARTHRALGIA, UNSPECIFIED JOINT: ICD-10-CM

## 2020-01-28 PROCEDURE — G0439 PPPS, SUBSEQ VISIT: HCPCS | Performed by: INTERNAL MEDICINE

## 2020-01-28 PROCEDURE — 99213 OFFICE O/P EST LOW 20 MIN: CPT | Performed by: INTERNAL MEDICINE

## 2020-01-28 NOTE — PATIENT INSTRUCTIONS
Medicare Wellness  Personal Prevention Plan of Service     Date of Office Visit:  2020  Encounter Provider:  Rachael Oconnell MD  Place of Service:  [facility]  Patient Name: Leonie Becker  :  1960    As part of the Medicare Wellness portion of your visit today, we are providing you with this personalized preventive plan of services (PPPS). This plan is based upon recommendations of the [place] Preventive Services Task Force (USPSTF) and the Advisory Committee on Immunization Practices (ACIP).    This lists the preventive care services that should be considered, and provides dates of when you are due. Items listed as completed are up-to-date and do not require any further intervention.    Health Maintenance   Topic Date Due   • ZOSTER VACCINE (1 of 2) 07/10/2008   • DXA SCAN  2019   • INFLUENZA VACCINE  2019   • MAMMOGRAM  2019   • MEDICARE ANNUAL WELLNESS  2020   • PAP SMEAR  10/05/2020   • LIPID PANEL  2021   • COLONOSCOPY  09/10/2024   • TDAP/TD VACCINES (4 - Td) 2027   • HEPATITIS C SCREENING  Completed       No orders of the defined types were placed in this encounter.      No follow-ups on file.

## 2020-01-28 NOTE — PROGRESS NOTES
Devonte Becker is a 59 y.o. female who presents for an annual wellness visit as well as check up of hld, op, IC.      History of Present Illness     HLD.  Moderate increase in cholesterol.  10 year risk is 2.1%.  She trys to follow a healthy diet.  OP.  She is maintained on Fosamax.    IC.  She is followed by urology.      Review of Systems   Respiratory: Negative.    Cardiovascular: Negative.    Musculoskeletal: Positive for arthralgias.       The following portions of the patient's history were reviewed and updated as appropriate: allergies, current medications, past family history, past medical history, past social history, past surgical history and problem list.  Health maintenance tab was reviewed and updated with the patient.       Patient Active Problem List    Diagnosis Date Noted   • Acquired hypothyroidism 02/08/2019   • Pelvic floor dysfunction 03/24/2016   • Atopic rhinitis 03/23/2016   • Neck pain 03/23/2016   • Chronic interstitial cystitis 03/23/2016     Note Last Updated: 3/23/2016     Description: Extreme case followed by urologist at  as well as Dr. Mendez Franz.     • Degeneration of intervertebral disc of cervical region 03/23/2016   • Hyperlipidemia 03/23/2016     Note Last Updated: 1/28/2020     Description: 1.9% 10 year risk of ASCVD in 12/8/14; 1.6% on 3/23/16, 1.5% 10 year risk 3/28/2017;  2.1% 10 year risk.  2.8% 10 year risk o 1/28/2020.      • Osteoporosis 03/23/2016     Note Last Updated: 1/25/2019     Start in 4/2017     • Tinnitus 02/01/2016   • Degeneration of intervertebral disc of mid-cervical region 02/01/2016       Past Medical History:   Diagnosis Date   • Abnormal blood smear    • Abnormal liver function test    • Back pain    • Blood tests for routine general physical examination    • Facial basal cell cancer    • Limb pain    • Rosacea        Past Surgical History:   Procedure Laterality Date   • CYSTOSCOPY      Diagnostic   • KNEE SURGERY         Family  History   Problem Relation Age of Onset   • Hypertension Mother         benign essential   • Diabetes Mother    • Heart disease Mother    • Thyroid disease Mother    • Aortic aneurysm Father         Thoracic   • Glaucoma Father    • Depression Sister    • Thyroid disease Sister        Social History     Socioeconomic History   • Marital status: Single     Spouse name: Not on file   • Number of children: Not on file   • Years of education: Not on file   • Highest education level: Not on file   Tobacco Use   • Smoking status: Former Smoker     Years: 2.00     Start date:      Last attempt to quit:      Years since quittin.0   • Smokeless tobacco: Never Used   Substance and Sexual Activity   • Alcohol use: No   • Drug use: No       Current Outpatient Medications on File Prior to Visit   Medication Sig Dispense Refill   • alendronate (FOSAMAX) 70 MG tablet TAKE 1 TABLET BY MOUTH ONCE WEEKLY 12 tablet 0   • baclofen (LIORESAL) 10 MG tablet Take by mouth 3 (three) times a day as needed. 1 to 2 tablets     • Cannabidiol 100 MG/ML solution cannabidiol (CBD) 100 mg/mL oral solution   Take by oral route.     • Cholecalciferol (VITAMIN D3) 400 UNITS capsule Take by mouth.     • Cod Liver Oil 10 MINIM capsule Take by mouth.     • diazepam (VALIUM) 5 MG tablet Take 1 tablet by mouth as needed.     • Estriol powder      • Hormone Cream Base cream      • lidocaine (LIDODERM) 5 % Apply 1 patch topically as needed.     • metroNIDAZOLE (METROCREAM) 0.75 % cream   6   • Omega-3 Fatty Acids (FISH OIL PO) Take by mouth.     • oxyCODONE (ROXICODONE) 5 MG immediate release tablet Take 1 to 2 tablets every 4 to 6 hours as needed for pain     • Progesterone Micronized (PROGESTERONE, BULK,) powder Place on the skin.       • promethazine (PHENERGAN) 25 MG tablet   2   • traMADol (ULTRAM) 50 MG tablet Take by mouth.       No current facility-administered medications on file prior to visit.        Allergies   Allergen Reactions   •  "Zoster Vac Recomb Adjuvanted Other (See Comments)       Immunization History   Administered Date(s) Administered   • Flu Mist 10/08/2015   • Flu Vaccine Intradermal Quad 18-64YR 09/30/2018   • Influenza, Unspecified 09/01/2018   • Shingrix 04/30/2019   • Tdap 10/01/2006, 04/24/2017       Objective     /74   Pulse 72   Ht 160 cm (62.99\")   Wt 49.9 kg (110 lb)   SpO2 98%   BMI 19.49 kg/m²     Physical Exam   Constitutional: She is oriented to person, place, and time. She appears well-developed and well-nourished.   HENT:   Head: Normocephalic and atraumatic.   Right Ear: Hearing, tympanic membrane and external ear normal.   Left Ear: Hearing, tympanic membrane and external ear normal.   Nose: Nose normal.   Mouth/Throat: Oropharynx is clear and moist.   Neck: Neck supple. No thyromegaly present.   Cardiovascular: Normal rate, regular rhythm and normal heart sounds.   No murmur heard.  Pulmonary/Chest: Effort normal and breath sounds normal. Right breast exhibits no mass. Left breast exhibits no mass. No breast tenderness.   Abdominal: Soft. She exhibits no distension. There is no hepatosplenomegaly. There is no tenderness.   Genitourinary: No breast tenderness.   Lymphadenopathy:     She has no cervical adenopathy.   Neurological: She is alert and oriented to person, place, and time.   Skin: Skin is warm and dry.   Psychiatric: She has a normal mood and affect. Her speech is normal and behavior is normal. Judgment and thought content normal. Cognition and memory are normal.       Assessment/Plan   Leonie was seen today for annual exam.    Diagnoses and all orders for this visit:    Medicare annual wellness visit, subsequent    Hyperlipidemia, unspecified hyperlipidemia type    Osteoporosis, unspecified osteoporosis type, unspecified pathological fracture presence    Chronic interstitial cystitis  -     Sedimentation Rate  -     C-reactive Protein    Arthralgia, unspecified joint  -     Sedimentation Rate  - "     C-reactive Protein        Discussion    AWV.  See scanned forms and/or computer for kannan history, PHQ-9, functional ability questionnaire, cognitive impairment screening.  Direct observation of cognitive abilities:  The patient does not exhibit any impairment in cognitive abilities upon direct observation at today's visit.   These were all reviewed with the patient and the patient was provided with a personal prevention plan of service in patient instructions.  Patient was given advice or information on the following topics:  nutrition, exercise.    HLD.  We discussed diet.  I recommend a diet high in fruits, vegetables, whole grains, lean meats, nuts and beans.  I recommend limiting red meat, full fat dairy, eggs and processed white carbohydrates.  I recommend aerobic exercise at least 3 days per week.  OP.  I recommend to get 1200 mg of calcium and 1000 IUs of vitamin D through diet and supplements and to participate in a weight based exercise to prevent loss of bone mineral density. Bone mineral will be monitored every two years.    IC.  Continue with urology.   She requests generalized testing for inflammation today because of her IC.          Health Maintenance   Topic Date Due   • DXA SCAN  04/13/2019   • MEDICARE ANNUAL WELLNESS  01/25/2020   • ZOSTER VACCINE (2 of 2) 01/28/2020 (Originally 6/25/2019)   • PAP SMEAR  10/05/2020   • MAMMOGRAM  11/01/2020   • LIPID PANEL  01/24/2021   • COLONOSCOPY  09/10/2024   • TDAP/TD VACCINES (4 - Td) 04/24/2027   • HEPATITIS C SCREENING  Completed   • INFLUENZA VACCINE  Addressed            Future Appointments   Date Time Provider Department Center   2/1/2021  9:00 AM [facility] None   2/1/2021  9:00 AM [facility] None   2/5/2021 11:15 AM Rachael Oconnell MD [facility] None

## 2020-01-29 LAB
CRP SERPL-MCNC: 0.12 MG/DL (ref 0–0.5)
ERYTHROCYTE [SEDIMENTATION RATE] IN BLOOD BY WESTERGREN METHOD: 7 MM/HR (ref 0–30)

## 2020-02-25 RX ORDER — ALENDRONATE SODIUM 70 MG/1
TABLET ORAL
Qty: 12 TABLET | Refills: 0 | Status: SHIPPED | OUTPATIENT
Start: 2020-02-25 | End: 2020-05-21

## 2020-04-27 ENCOUNTER — TELEMEDICINE (OUTPATIENT)
Dept: INTERNAL MEDICINE | Facility: CLINIC | Age: 60
End: 2020-04-27

## 2020-04-27 ENCOUNTER — TELEPHONE (OUTPATIENT)
Dept: INTERNAL MEDICINE | Facility: CLINIC | Age: 60
End: 2020-04-27

## 2020-04-27 DIAGNOSIS — N30.00 ACUTE CYSTITIS WITHOUT HEMATURIA: Primary | ICD-10-CM

## 2020-04-27 PROCEDURE — 99213 OFFICE O/P EST LOW 20 MIN: CPT | Performed by: INTERNAL MEDICINE

## 2020-04-27 RX ORDER — SULFAMETHOXAZOLE AND TRIMETHOPRIM 800; 160 MG/1; MG/1
1 TABLET ORAL 2 TIMES DAILY
Qty: 14 TABLET | Refills: 0 | Status: SHIPPED | OUTPATIENT
Start: 2020-04-27 | End: 2021-02-05

## 2020-04-27 NOTE — TELEPHONE ENCOUNTER
[phone]    Took OTC test AZO strip, test results states call physician.    Has been self-treating Hemmoroids and feels like she contaminated herself    Would like an order to go to a lab for culture and sensitivity.    Low back pain, irritated, feels like she has to go more often    Can tolerate Bactrim

## 2020-04-27 NOTE — PROGRESS NOTES
Devonte Becker is a 59 y.o. female who presents with   Chief Complaint   Patient presents with   • Difficulty Urinating       History of Present Illness     You have chosen to receive care through a telehealth visit.  Do you consent to use a video/audio connection for your medical care today? Yes.  Time of video 16 minutes.        C/o dysuria.  Going on several days.  No fever is associated.  Urgency and frequency.      Review of Systems   Musculoskeletal: Positive for back pain.       The following portions of the patient's history were reviewed and updated as appropriate: allergies, current medications and problem list.    Patient Active Problem List    Diagnosis Date Noted   • Acquired hypothyroidism 02/08/2019   • Pelvic floor dysfunction 03/24/2016   • Atopic rhinitis 03/23/2016   • Neck pain 03/23/2016   • Chronic interstitial cystitis 03/23/2016     Note Last Updated: 3/23/2016     Description: Extreme case followed by urologist at  as well as Dr. Mendez Franz.     • Degeneration of intervertebral disc of cervical region 03/23/2016   • Hyperlipidemia 03/23/2016     Note Last Updated: 1/28/2020     Description: 1.9% 10 year risk of ASCVD in 12/8/14; 1.6% on 3/23/16, 1.5% 10 year risk 3/28/2017;  2.1% 10 year risk.  2.8% 10 year risk o 1/28/2020.      • Osteoporosis 03/23/2016     Note Last Updated: 1/25/2019     Start in 4/2017     • Tinnitus 02/01/2016   • Degeneration of intervertebral disc of mid-cervical region 02/01/2016       Current Outpatient Medications on File Prior to Visit   Medication Sig Dispense Refill   • alendronate (FOSAMAX) 70 MG tablet TAKE 1 TABLET BY MOUTH ONCE WEEKLY 12 tablet 0   • baclofen (LIORESAL) 10 MG tablet Take by mouth 3 (three) times a day as needed. 1 to 2 tablets     • Cannabidiol 100 MG/ML solution cannabidiol (CBD) 100 mg/mL oral solution   Take by oral route.     • Cholecalciferol (VITAMIN D3) 400 UNITS capsule Take by mouth.     • Cod Liver Oil 10  MINIM capsule Take by mouth.     • diazepam (VALIUM) 5 MG tablet Take 1 tablet by mouth as needed.     • Estriol powder      • Hormone Cream Base cream      • lidocaine (LIDODERM) 5 % Apply 1 patch topically as needed.     • metroNIDAZOLE (METROCREAM) 0.75 % cream   6   • Omega-3 Fatty Acids (FISH OIL PO) Take by mouth.     • oxyCODONE (ROXICODONE) 5 MG immediate release tablet Take 1 to 2 tablets every 4 to 6 hours as needed for pain     • Progesterone Micronized (PROGESTERONE, BULK,) powder Place on the skin.       • promethazine (PHENERGAN) 25 MG tablet   2   • traMADol (ULTRAM) 50 MG tablet Take by mouth.       No current facility-administered medications on file prior to visit.        Objective     There were no vitals taken for this visit.    Physical Exam   Constitutional: She is oriented to person, place, and time. She appears well-developed and well-nourished.   HENT:   Head: Normocephalic and atraumatic.   Pulmonary/Chest: Effort normal.   Neurological: She is alert and oriented to person, place, and time.   Psychiatric: She has a normal mood and affect. Her behavior is normal.       Assessment/Plan   Leonie was seen today for difficulty urinating.    Diagnoses and all orders for this visit:    Acute cystitis without hematuria    Other orders  -     sulfamethoxazole-trimethoprim (Bactrim DS) 800-160 MG per tablet; Take 1 tablet by mouth 2 (Two) Times a Day.        Discussion    Patient presents with acute cystitis.  A prescription for antibiotics is given.  Let me know if not feeling better over the next several days or if there is any change in symptoms.           Future Appointments   Date Time Provider Department Center   2/1/2021  9:00 AM [facility] None   2/1/2021  9:00 AM [facility] None   2/5/2021 11:15 AM Rachael Oconnell MD [facility] None

## 2020-05-21 RX ORDER — ALENDRONATE SODIUM 70 MG/1
TABLET ORAL
Qty: 12 TABLET | Refills: 0 | Status: SHIPPED | OUTPATIENT
Start: 2020-05-21 | End: 2021-01-18

## 2020-06-22 ENCOUNTER — OFFICE VISIT (OUTPATIENT)
Dept: NEUROSURGERY | Facility: CLINIC | Age: 60
End: 2020-06-22

## 2020-06-22 VITALS
HEART RATE: 64 BPM | TEMPERATURE: 97.4 F | DIASTOLIC BLOOD PRESSURE: 60 MMHG | WEIGHT: 110 LBS | BODY MASS INDEX: 19.49 KG/M2 | HEIGHT: 63 IN | RESPIRATION RATE: 16 BRPM | SYSTOLIC BLOOD PRESSURE: 103 MMHG

## 2020-06-22 DIAGNOSIS — M50.30 DEGENERATION OF INTERVERTEBRAL DISC OF CERVICAL REGION: ICD-10-CM

## 2020-06-22 DIAGNOSIS — M54.2 NECK PAIN: Primary | ICD-10-CM

## 2020-06-22 PROCEDURE — 99203 OFFICE O/P NEW LOW 30 MIN: CPT | Performed by: NURSE PRACTITIONER

## 2020-06-30 ENCOUNTER — TELEPHONE (OUTPATIENT)
Dept: NEUROSURGERY | Facility: CLINIC | Age: 60
End: 2020-06-30

## 2020-06-30 NOTE — TELEPHONE ENCOUNTER
1248- Patient called back . She wants to discuss a few things with Mihaela Diaz. I told her Mihaela is seeing patient's and I would be happy to see if I can help her and if not I will send the message to Mihaela and we will get her the answers. She states that her MRI order is not correct, I asked her what is wrong with it? She states the indications listed on the actual order says left arm pain and she actually has pain in both arms. I told her that it was fine that the order did not show that, the actual diagnosis is what is important along with the notes in her actual office visit, told her the diagnosis reads: Neck pain and degeneration of intervertebral disc of cervical region and that Mihaela's office note read bilateral arm pain which is correct. She then mentioned something about Dr. Gan saying she had problems with the top of her Thoracic. I told her I did not see anything in her EMG that mentioned that however we will show it (the EMG) to Mihaela and see if she needs anything further. I told her if Mihaela thought it was necessary or her exam revealed anything pointing to her Thoracic she would have ordered it. She mentions she has an issue with her hearing and that the MRI's are often very loud for her. She states she might just need to call Dr. Gan and see what he was talking about. She states she is in quite a bit of pain and has been for several months and she wants to figure out a cause for her pain as Dr. Gan told her it was not in her neck.    Verbally spoke with Mihaela Diaz, There was nothing on her exam that would warrant a Thoracic MRI.    Called the patient back and explained what Mihaela Diaz had said above. She mentioned again that she does not understand why the indication on her cervical MRI says left arm pain, when she has pain in both. I explained the above message again to her and she verbalized understanding. She said again that Dr. Gan said it was not her neck so she does not understand why she needs the  cervical MRI. She again mentioned that she has seen a hand surgeon and he thinks her symptoms in her hands are far worse just to be ulnar related. Said Dr. Gan only did EMG of her elbows and not her upper extremity's. I explained to her that her upper extremity's include her hands, arms, elbows etc. She did not realize it was called her upper extremity. She understands that no Thoracic MRI will be ordered. She will check with Dr. Gan again to see what he was talking about in her Thoracic and discuss that when she comes into see Dr. De La Vega 8/17/2020. I told her that is a great idea. She will call in the meantime if anything else occurs or she needs anything from us.

## 2020-06-30 NOTE — TELEPHONE ENCOUNTER
Patient called and wanted to speak with Mihaela regarding her diagnosis on her cervical mri.  The patient stated that the order states left arm pain and she said she has pain in both arms and also wants to speak with her about possibly getting a thoracic mri.

## 2020-08-10 DIAGNOSIS — M50.30 DEGENERATION OF INTERVERTEBRAL DISC OF CERVICAL REGION: ICD-10-CM

## 2020-08-10 DIAGNOSIS — M54.2 NECK PAIN: ICD-10-CM

## 2020-08-11 NOTE — PROGRESS NOTES
Subjective   History of Present Illness: Leonie Becker is a 60 y.o. female is here today for follow-up to discuss cervical MRI results done at [facility] 8.7.20.  She had bilateral upper extremity EMG with Dr Gan on 3.20.20.  She saw Dr Waldron on 6.10.20 and he did not feel that she was surgical.    She is in pain management with [facility] and has had cervical BHUPINDER which help, however,she is concerned about the steroid use affecting her interstitial cystitis.  She was last seen in June and was told to return in August to discuss possible MBB and RFA    Patient was last seen 6.22.20 for bilateral arm pain, neck pain, weakness and numbness     I saw this patient last about 4 years ago. Since then a number of things have happened. She has had some inner ear problems that have resulted in progressive tinnitus and hyperacusis. She had difficulty getting the cervical MRI because of the noise and was asking if we could send her to a noiseless MRI which I am not aware there are any in this particular area. In any event, she is also working with Dr. Waldron regarding symptoms in her arms on both sides, left worse than the right, from the elbow down. She has Tinel’s signs bilaterally over the ulnar nerve and an EMG and nerve conduction study that shows bilateral cubital tunnel syndrome and a right-sided carpal tunnel syndrome. The question was whether or not there was any involvement from the C7-T1 level that could account for some of the hand symptoms and I did not see that on the cervical MRI. What I did see was some left C3-C4 osteophytic nerve impingement and she does have some left C4 pain in the posterior shoulder and the upper arm, but I do not think that is what causes the symptoms, particularly in her 4th and 5th digits. I think that is predominantly the ulnar nerve bilaterally. She has been undergoing physical therapy and it is not really helping. I told her to follow up with Dr. Waldron. Frankly, I think she is  probably heading towards bilateral ulnar decompressions and possibly transpositions. I think that is the appropriate thing to do from my standpoint. I asked her to come back to see me in 6 months so we can access what is left over from the neck after the peripheral nerve surgeries are done.       Neck Pain    The problem occurs constantly. The problem has been unchanged. The pain is present in the midline. The pain is at a severity of 5/10. The pain is moderate. Associated symptoms include numbness and weakness. Pertinent negatives include no fever.   Arm Pain    The pain is present in the upper right arm, upper left arm, right forearm and left forearm. The pain is at a severity of 6/10. The pain is moderate. The pain has been constant since the incident. Associated symptoms include numbness.   Extremity Weakness    The pain is present in the left arm and right arm. The problem occurs constantly. The problem has been unchanged. The pain is at a severity of 6/10. Associated symptoms include numbness. Pertinent negatives include no fever.       The following portions of the patient's history were reviewed and updated as appropriate: allergies, current medications, past family history, past medical history, past social history, past surgical history and problem list.    Review of Systems   Constitutional: Negative for fever.   HENT: Negative.    Eyes: Negative.    Respiratory: Negative.    Cardiovascular: Negative.    Gastrointestinal: Negative.    Endocrine: Negative.    Genitourinary: Negative for urgency.   Musculoskeletal: Positive for extremity weakness, neck pain and neck stiffness. Negative for arthralgias, gait problem, joint swelling and myalgias.   Allergic/Immunologic: Negative.    Neurological: Positive for weakness and numbness.   Hematological: Negative.    Psychiatric/Behavioral: Negative.            Objective     Vitals:    08/17/20 0953   BP: 116/78   Pulse: 74   Temp: 98.4 °F (36.9 °C)   TempSrc:  "Tympanic   Height: 160 cm (62.99\")     Body mass index is 19.49 kg/m².      Physical Exam   Constitutional: She is oriented to person, place, and time. She appears well-developed and well-nourished.   HENT:   Head: Normocephalic and atraumatic.   Eyes: Pupils are equal, round, and reactive to light. Conjunctivae and EOM are normal.   Fundoscopic exam:       The right eye shows no papilledema. The right eye shows venous pulsations.        The left eye shows no papilledema. The left eye shows venous pulsations.   Neck: Carotid bruit is not present.   Neurological: She is oriented to person, place, and time. She has a normal Finger-Nose-Finger Test and a normal Heel to Shin Test. Gait normal.   Reflex Scores:       Tricep reflexes are 2+ on the right side and 2+ on the left side.       Bicep reflexes are 2+ on the right side and 2+ on the left side.       Brachioradialis reflexes are 2+ on the right side and 2+ on the left side.       Patellar reflexes are 2+ on the right side and 2+ on the left side.       Achilles reflexes are 2+ on the right side and 2+ on the left side.  Psychiatric: Her speech is normal.     Neurologic Exam     Mental Status   Oriented to person, place, and time.   Registration of memory: Good recent and remote memory.   Attention: normal. Concentration: normal.   Speech: speech is normal   Level of consciousness: alert  Knowledge: consistent with education.     Cranial Nerves     CN II   Visual fields full to confrontation.   Visual acuity: normal    CN III, IV, VI   Pupils are equal, round, and reactive to light.  Extraocular motions are normal.     CN V   Facial sensation intact.   Right corneal reflex: normal  Left corneal reflex: normal    CN VII   Facial expression full, symmetric.   Right facial weakness: none  Left facial weakness: none    CN VIII   Hearing: intact    CN IX, X   Palate: symmetric    CN XI   Right sternocleidomastoid strength: normal  Left sternocleidomastoid strength: " normal    CN XII   Tongue: not atrophic  Tongue deviation: none    Motor Exam   Muscle bulk: normal  Right arm tone: normal  Left arm tone: normal  Right leg tone: normal  Left leg tone: normal    Strength   Strength 5/5 except as noted.   Left interossei: 4/5    Sensory Exam   Light touch normal.     Gait, Coordination, and Reflexes     Gait  Gait: normal    Coordination   Finger to nose coordination: normal  Heel to shin coordination: normal    Reflexes   Right brachioradialis: 2+  Left brachioradialis: 2+  Right biceps: 2+  Left biceps: 2+  Right triceps: 2+  Left triceps: 2+  Right patellar: 2+  Left patellar: 2+  Right achilles: 2+  Left achilles: 2+  Right : 2+  Left : 2+          Assessment/Plan   Independent Review of Radiographic Studies:      I personally reviewed the images from the following studies.    I reviewed the cervical MRI done on 8/7/2020 which shows no evidence of any root compression at C7-T1.  There is a right-sided C6-C7 disc protrusion and on the left there is some foraminal stenosis with a disc protrusion at that level compressing the left C4 nerve root.  At C4-C5 and C5-C6 there is some disc bulging and very mild foraminal stenosis.  I do not think the stenosis at C5-C6 is very significant.  The worst of it is seems to be at C3-C4 towards the left.  Otherwise agree with the report.        Medical Decision Making:      Again, I think most of her problems of the bilateral cubital tunnel syndrome.  I urged her to follow-up with Dr. Waldron.  She is probably heading towards surgery for that.  I think it would probably help most of her symptoms from the elbows down.  There is bound to be some residual symptoms in the left trapezius region and upper arm from the C3-C4 root compression but that is not the main thing that is bothering her.  I told her to come back to see me in 6 months presumably after she has had some surgical intervention and we can regroup as to what symptoms the neck  accounts for at that point.      Leonie was seen today for neck pain, arm pain, extremity weakness and numbness.    Diagnoses and all orders for this visit:    Ulnar neuropathy of both upper extremities    Carpal tunnel syndrome of right wrist    Degeneration of cervical intervertebral disc      Return in about 6 months (around 2/17/2021).

## 2020-08-17 ENCOUNTER — OFFICE VISIT (OUTPATIENT)
Dept: NEUROSURGERY | Facility: CLINIC | Age: 60
End: 2020-08-17

## 2020-08-17 VITALS
SYSTOLIC BLOOD PRESSURE: 116 MMHG | TEMPERATURE: 98.4 F | HEIGHT: 63 IN | HEART RATE: 74 BPM | BODY MASS INDEX: 19.49 KG/M2 | DIASTOLIC BLOOD PRESSURE: 78 MMHG

## 2020-08-17 DIAGNOSIS — G56.23 ULNAR NEUROPATHY OF BOTH UPPER EXTREMITIES: Primary | ICD-10-CM

## 2020-08-17 DIAGNOSIS — G56.01 CARPAL TUNNEL SYNDROME OF RIGHT WRIST: ICD-10-CM

## 2020-08-17 DIAGNOSIS — M50.30 DEGENERATION OF CERVICAL INTERVERTEBRAL DISC: ICD-10-CM

## 2020-08-17 PROCEDURE — 99214 OFFICE O/P EST MOD 30 MIN: CPT | Performed by: NEUROLOGICAL SURGERY

## 2020-09-01 ENCOUNTER — TELEPHONE (OUTPATIENT)
Dept: NEUROSURGERY | Facility: CLINIC | Age: 60
End: 2020-09-01

## 2020-09-01 NOTE — TELEPHONE ENCOUNTER
PATIENT CALLED CHECKING ON THE STATUS OF MSG PROVIDER WAS TO SEND TO DR. MERCADO. PATIENT REQUESTING FOR FINDINGS FROM LAST OV ON 8/17 RELATED TO CERVICAL ISSUES & ELBOW TO BE SENT TO [facility]. PATIENT HAS AN APPT SCHEDULED FOR 9/18 AT 1PM.    DR. EMBER PRO  PH:[phone]  FAX: [phone]    IF ANY ADDITIONAL QUESTIONS REGARDING REQUEST, PATIENT CAN BE CONTACTED [phone]

## 2020-09-21 NOTE — TELEPHONE ENCOUNTER
Patient called stating she had an appointment with the referring doctor, Dr. Shahriar Waldron on 9/18/20 and the notes that were to be sent to him from Dr. De La Vega, explaining the MRI findings were not there. She said it resulted in a not so pleasant visit with Dr. Waldron. Please fax office notes from 8/17/20 to Dr. Waldron [phone]. Thanks.

## 2020-09-21 NOTE — TELEPHONE ENCOUNTER
Spoke to Samantha john/ Dr. Waldron's office and they have access to Epic and they also received the fax from our office. They have explained to the pt multiple times that they have ALL of her records and there is no other info needed.

## 2020-10-20 ENCOUNTER — TELEMEDICINE (OUTPATIENT)
Dept: INTERNAL MEDICINE | Facility: CLINIC | Age: 60
End: 2020-10-20

## 2020-10-20 DIAGNOSIS — H93.13 TINNITUS OF BOTH EARS: Primary | ICD-10-CM

## 2020-10-20 DIAGNOSIS — M81.0 OSTEOPOROSIS, UNSPECIFIED OSTEOPOROSIS TYPE, UNSPECIFIED PATHOLOGICAL FRACTURE PRESENCE: ICD-10-CM

## 2020-10-20 PROCEDURE — 99213 OFFICE O/P EST LOW 20 MIN: CPT | Performed by: INTERNAL MEDICINE

## 2020-10-20 NOTE — PROGRESS NOTES
Devonte Becker is a 60 y.o. female who presents with   Chief Complaint   Patient presents with   • Tinnitus       History of Present Illness     You have chosen to receive care through a telehealth visit.  Do you consent to use a video/audio connection for your medical care today? Yes.    Patient would like an opinion about getting the flu shot with regards to possibility of making her tinnitus worse.  Generally I would advised getting the flu shot.  I am unaware of it causing or exacerbating her tinnitus.  However, ultimately it is her decision.      Additionally, she stopped fosamax because she thought it might have been making tiinutis worse.  She restarted it this past week.  She is unsure now if making worse or not.         Review of Systems   HENT: Positive for hearing loss and tinnitus.    Genitourinary: Positive for dysuria.       The following portions of the patient's history were reviewed and updated as appropriate: allergies, current medications and problem list.    Patient Active Problem List    Diagnosis Date Noted   • Ulnar neuropathy of both upper extremities 08/17/2020   • Carpal tunnel syndrome of right wrist 08/17/2020   • Acquired hypothyroidism 02/08/2019   • Pelvic floor dysfunction 03/24/2016   • Atopic rhinitis 03/23/2016   • Neck pain 03/23/2016   • Chronic interstitial cystitis 03/23/2016     Note Last Updated: 3/23/2016     Description: Extreme case followed by urologist at  as well as Dr. Mendez Franz.     • Degeneration of cervical intervertebral disc 03/23/2016   • Hyperlipidemia 03/23/2016     Note Last Updated: 1/28/2020     Description: 1.9% 10 year risk of ASCVD in 12/8/14; 1.6% on 3/23/16, 1.5% 10 year risk 3/28/2017;  2.1% 10 year risk.  2.8% 10 year risk o 1/28/2020.      • Osteoporosis 03/23/2016     Note Last Updated: 1/25/2019     Start in 4/2017     • Tinnitus 02/01/2016   • Degeneration of intervertebral disc of mid-cervical region 02/01/2016       Current  Outpatient Medications on File Prior to Visit   Medication Sig Dispense Refill   • alendronate (FOSAMAX) 70 MG tablet TAKE 1 TABLET BY MOUTH ONCE WEEKLY AS DIRECTED 12 tablet 0   • baclofen (LIORESAL) 10 MG tablet Take by mouth 3 (three) times a day as needed. 1 to 2 tablets     • Cannabidiol 100 MG/ML solution cannabidiol (CBD) 100 mg/mL oral solution   Take by oral route.     • Cholecalciferol (VITAMIN D3) 400 UNITS capsule Take by mouth.     • Cod Liver Oil 10 MINIM capsule Take by mouth.     • diazepam (VALIUM) 5 MG tablet Take 1 tablet by mouth as needed.     • Estriol powder      • Hormone Cream Base cream      • lidocaine (LIDODERM) 5 % Apply 1 patch topically as needed.     • metroNIDAZOLE (METROCREAM) 0.75 % cream   6   • Omega-3 Fatty Acids (FISH OIL PO) Take by mouth.     • oxyCODONE (ROXICODONE) 5 MG immediate release tablet Take 1 to 2 tablets every 4 to 6 hours as needed for pain     • PREBIOTIC PRODUCT PO Take  by mouth.     • Probiotic Product (PROBIOTIC ADVANCED PO) Take  by mouth.     • Progesterone Micronized (PROGESTERONE, BULK,) powder Place on the skin.       • promethazine (PHENERGAN) 25 MG tablet   2   • sulfamethoxazole-trimethoprim (Bactrim DS) 800-160 MG per tablet Take 1 tablet by mouth 2 (Two) Times a Day. 14 tablet 0   • traMADol (ULTRAM) 50 MG tablet Take by mouth.       No current facility-administered medications on file prior to visit.        Objective     There were no vitals taken for this visit.    Physical Exam  Constitutional:       Appearance: She is well-developed.   HENT:      Head: Normocephalic and atraumatic.   Pulmonary:      Effort: Pulmonary effort is normal.   Neurological:      Mental Status: She is alert and oriented to person, place, and time.   Psychiatric:         Behavior: Behavior normal.         Assessment/Plan   Diagnoses and all orders for this visit:    1. Tinnitus of both ears (Primary)    2. Osteoporosis, unspecified osteoporosis type, unspecified  pathological fracture presence        Discussion    Tinnitus.  Continue with ENT and audiology.    OP.  I recommend to get 1200 mg of calcium and 1000 IUs of vitamin D through diet and supplements and to participate in a weight based exercise to prevent loss of bone mineral density. Bone mineral will be monitored every two years.  I would continue Fosamax at this point is she thinks it is not obviously making her tinnitus worse.    10/15 minutes was spent in counseling of the following topics:, test results, impressions, treatment options         Future Appointments   Date Time Provider Department Center   2/1/2021  9:00 AM [facility] None   2/1/2021  9:00 AM [facility] None   2/5/2021 11:15 AM Rachael Oconnell MD [facility] None   2/15/2021  8:30 AM Wang De La Vega MD [facility]

## 2020-10-22 ENCOUNTER — HOSPITAL ENCOUNTER (OUTPATIENT)
Dept: CARDIOLOGY | Facility: HOSPITAL | Age: 60
Discharge: HOME | End: 2020-10-22

## 2020-10-22 ENCOUNTER — TRANSCRIBE ORDERS (OUTPATIENT)
Dept: ADMINISTRATIVE | Facility: HOSPITAL | Age: 60
End: 2020-10-22

## 2020-10-22 ENCOUNTER — LAB (OUTPATIENT)
Dept: LAB | Facility: HOSPITAL | Age: 60
End: 2020-10-22

## 2020-10-22 DIAGNOSIS — Z01.818 PRE-OP TESTING: ICD-10-CM

## 2020-10-22 DIAGNOSIS — Z01.818 PRE-OP TESTING: Primary | ICD-10-CM

## 2020-10-22 LAB
ANION GAP SERPL CALCULATED.3IONS-SCNC: 8.2 MMOL/L (ref 5–15)
BUN SERPL-MCNC: 14 MG/DL (ref 8–23)
BUN/CREAT SERPL: 14.3 (ref 7–25)
CALCIUM SPEC-SCNC: 9.4 MG/DL (ref 8.6–10.5)
CHLORIDE SERPL-SCNC: 106 MMOL/L (ref 98–107)
CO2 SERPL-SCNC: 25.8 MMOL/L (ref 22–29)
CREAT SERPL-MCNC: 0.98 MG/DL (ref 0.57–1)
GFR SERPL CREATININE-BSD FRML MDRD: 58 ML/MIN/1.73
GLUCOSE SERPL-MCNC: 98 MG/DL (ref 65–99)
POTASSIUM SERPL-SCNC: 4.2 MMOL/L (ref 3.5–5.2)
SODIUM SERPL-SCNC: 140 MMOL/L (ref 136–145)

## 2020-10-22 PROCEDURE — 36415 COLL VENOUS BLD VENIPUNCTURE: CPT

## 2020-10-22 PROCEDURE — 80048 BASIC METABOLIC PNL TOTAL CA: CPT

## 2020-10-22 PROCEDURE — 93010 ELECTROCARDIOGRAM REPORT: CPT | Performed by: INTERNAL MEDICINE

## 2020-10-22 PROCEDURE — 93005 ELECTROCARDIOGRAM TRACING: CPT | Performed by: PLASTIC SURGERY

## 2021-01-11 ENCOUNTER — APPOINTMENT (OUTPATIENT)
Dept: WOMENS IMAGING | Facility: HOSPITAL | Age: 61
End: 2021-01-11

## 2021-01-11 PROCEDURE — 77063 BREAST TOMOSYNTHESIS BI: CPT | Performed by: RADIOLOGY

## 2021-01-11 PROCEDURE — 77067 SCR MAMMO BI INCL CAD: CPT | Performed by: RADIOLOGY

## 2021-01-18 RX ORDER — ALENDRONATE SODIUM 70 MG/1
TABLET ORAL
Qty: 12 TABLET | Refills: 0 | Status: SHIPPED | OUTPATIENT
Start: 2021-01-18 | End: 2021-05-26

## 2021-02-01 DIAGNOSIS — E78.5 HYPERLIPIDEMIA, UNSPECIFIED HYPERLIPIDEMIA TYPE: Primary | ICD-10-CM

## 2021-02-01 DIAGNOSIS — E03.9 ACQUIRED HYPOTHYROIDISM: ICD-10-CM

## 2021-02-01 LAB
ALBUMIN SERPL-MCNC: 4.1 G/DL (ref 3.5–5.2)
ALBUMIN/GLOB SERPL: 1.9 G/DL
ALP SERPL-CCNC: 83 U/L (ref 39–117)
ALT SERPL-CCNC: 36 U/L (ref 1–33)
APPEARANCE UR: CLEAR
AST SERPL-CCNC: 41 U/L (ref 1–32)
BACTERIA #/AREA URNS HPF: NORMAL /HPF
BASOPHILS # BLD AUTO: 0.07 10*3/MM3 (ref 0–0.2)
BASOPHILS NFR BLD AUTO: 1.4 % (ref 0–1.5)
BILIRUB SERPL-MCNC: <0.2 MG/DL (ref 0–1.2)
BILIRUB UR QL STRIP: NEGATIVE
BUN SERPL-MCNC: 14 MG/DL (ref 8–23)
BUN/CREAT SERPL: 11.8 (ref 7–25)
CALCIUM SERPL-MCNC: 8.9 MG/DL (ref 8.6–10.5)
CASTS URNS MICRO: NORMAL
CHLORIDE SERPL-SCNC: 105 MMOL/L (ref 98–107)
CHOLEST SERPL-MCNC: 264 MG/DL (ref 0–200)
CO2 SERPL-SCNC: 27.5 MMOL/L (ref 22–29)
COLOR UR: YELLOW
CREAT SERPL-MCNC: 1.19 MG/DL (ref 0.57–1)
EOSINOPHIL # BLD AUTO: 0.18 10*3/MM3 (ref 0–0.4)
EOSINOPHIL NFR BLD AUTO: 3.7 % (ref 0.3–6.2)
EPI CELLS #/AREA URNS HPF: NORMAL /HPF
ERYTHROCYTE [DISTWIDTH] IN BLOOD BY AUTOMATED COUNT: 12.1 % (ref 12.3–15.4)
GLOBULIN SER CALC-MCNC: 2.2 GM/DL
GLUCOSE SERPL-MCNC: 92 MG/DL (ref 65–99)
GLUCOSE UR QL: NEGATIVE
HCT VFR BLD AUTO: 41.5 % (ref 34–46.6)
HDLC SERPL-MCNC: 87 MG/DL (ref 40–60)
HGB BLD-MCNC: 13.2 G/DL (ref 12–15.9)
HGB UR QL STRIP: NEGATIVE
IMM GRANULOCYTES # BLD AUTO: 0.01 10*3/MM3 (ref 0–0.05)
IMM GRANULOCYTES NFR BLD AUTO: 0.2 % (ref 0–0.5)
KETONES UR QL STRIP: NEGATIVE
LDLC SERPL CALC-MCNC: 162 MG/DL (ref 0–100)
LEUKOCYTE ESTERASE UR QL STRIP: NEGATIVE
LYMPHOCYTES # BLD AUTO: 1.67 10*3/MM3 (ref 0.7–3.1)
LYMPHOCYTES NFR BLD AUTO: 33.9 % (ref 19.6–45.3)
MCH RBC QN AUTO: 28.7 PG (ref 26.6–33)
MCHC RBC AUTO-ENTMCNC: 31.8 G/DL (ref 31.5–35.7)
MCV RBC AUTO: 90.2 FL (ref 79–97)
MONOCYTES # BLD AUTO: 0.43 10*3/MM3 (ref 0.1–0.9)
MONOCYTES NFR BLD AUTO: 8.7 % (ref 5–12)
NEUTROPHILS # BLD AUTO: 2.56 10*3/MM3 (ref 1.7–7)
NEUTROPHILS NFR BLD AUTO: 52.1 % (ref 42.7–76)
NITRITE UR QL STRIP: NEGATIVE
NRBC BLD AUTO-RTO: 0 /100 WBC (ref 0–0.2)
PH UR STRIP: 6.5 [PH] (ref 5–8)
PLATELET # BLD AUTO: 261 10*3/MM3 (ref 140–450)
POTASSIUM SERPL-SCNC: 3.8 MMOL/L (ref 3.5–5.2)
PROT SERPL-MCNC: 6.3 G/DL (ref 6–8.5)
PROT UR QL STRIP: NEGATIVE
RBC # BLD AUTO: 4.6 10*6/MM3 (ref 3.77–5.28)
RBC #/AREA URNS HPF: NORMAL /HPF
SODIUM SERPL-SCNC: 141 MMOL/L (ref 136–145)
SP GR UR: 1.02 (ref 1–1.03)
TRIGL SERPL-MCNC: 92 MG/DL (ref 0–150)
TSH SERPL DL<=0.005 MIU/L-ACNC: 3.49 UIU/ML (ref 0.27–4.2)
UROBILINOGEN UR STRIP-MCNC: NORMAL MG/DL
VLDLC SERPL CALC-MCNC: 15 MG/DL (ref 5–40)
WBC # BLD AUTO: 4.92 10*3/MM3 (ref 3.4–10.8)
WBC #/AREA URNS HPF: NORMAL /HPF

## 2021-02-05 ENCOUNTER — OFFICE VISIT (OUTPATIENT)
Dept: INTERNAL MEDICINE | Facility: CLINIC | Age: 61
End: 2021-02-05

## 2021-02-05 ENCOUNTER — APPOINTMENT (OUTPATIENT)
Dept: WOMENS IMAGING | Facility: HOSPITAL | Age: 61
End: 2021-02-05

## 2021-02-05 VITALS
HEART RATE: 66 BPM | HEIGHT: 63 IN | DIASTOLIC BLOOD PRESSURE: 78 MMHG | WEIGHT: 110 LBS | SYSTOLIC BLOOD PRESSURE: 134 MMHG | BODY MASS INDEX: 19.49 KG/M2 | OXYGEN SATURATION: 95 %

## 2021-02-05 DIAGNOSIS — Z13.6 ENCOUNTER FOR SCREENING FOR VASCULAR DISEASE: ICD-10-CM

## 2021-02-05 DIAGNOSIS — E78.5 HYPERLIPIDEMIA, UNSPECIFIED HYPERLIPIDEMIA TYPE: ICD-10-CM

## 2021-02-05 DIAGNOSIS — M81.0 OSTEOPOROSIS, UNSPECIFIED OSTEOPOROSIS TYPE, UNSPECIFIED PATHOLOGICAL FRACTURE PRESENCE: ICD-10-CM

## 2021-02-05 DIAGNOSIS — Z00.00 MEDICARE ANNUAL WELLNESS VISIT, INITIAL: Primary | ICD-10-CM

## 2021-02-05 PROBLEM — M51.36 DEGENERATION OF LUMBAR INTERVERTEBRAL DISC: Status: ACTIVE | Noted: 2020-01-21

## 2021-02-05 PROBLEM — M51.369 DEGENERATION OF LUMBAR INTERVERTEBRAL DISC: Status: ACTIVE | Noted: 2020-01-21

## 2021-02-05 PROBLEM — G56.20 CUBITAL TUNNEL SYNDROME: Status: ACTIVE | Noted: 2020-09-08

## 2021-02-05 PROBLEM — M47.812 CERVICAL SPONDYLOSIS: Status: ACTIVE | Noted: 2020-01-21

## 2021-02-05 PROBLEM — H93.233 HYPERACUSIS, BILATERAL: Status: ACTIVE | Noted: 2018-07-16

## 2021-02-05 PROCEDURE — G0279 TOMOSYNTHESIS, MAMMO: HCPCS | Performed by: RADIOLOGY

## 2021-02-05 PROCEDURE — 77065 DX MAMMO INCL CAD UNI: CPT | Performed by: RADIOLOGY

## 2021-02-05 PROCEDURE — G0439 PPPS, SUBSEQ VISIT: HCPCS | Performed by: INTERNAL MEDICINE

## 2021-02-05 PROCEDURE — 76641 ULTRASOUND BREAST COMPLETE: CPT | Performed by: RADIOLOGY

## 2021-02-05 PROCEDURE — 99213 OFFICE O/P EST LOW 20 MIN: CPT | Performed by: INTERNAL MEDICINE

## 2021-02-05 RX ORDER — AZELAIC ACID 0.15 G/G
GEL TOPICAL
COMMUNITY
End: 2021-05-21

## 2021-02-05 RX ORDER — IVERMECTIN
POWDER (GRAM) MISCELLANEOUS
COMMUNITY
Start: 2021-01-06

## 2021-02-05 NOTE — PROGRESS NOTES
The ABCs of the Annual Wellness Visit  Subsequent Medicare Wellness Visit    Chief Complaint   Patient presents with   • Medicare Wellness-subsequent   • Hyperlipidemia       Subjective   History of Present Illness:  Leonie Becker is a 60 y.o. female who presents for a Subsequent Medicare Wellness Visit.    The following data was reviewed by: Rachael Oconnell MD on 2021:  Common labs    Common Labsle 10/22/20 2/1/21 2/1/21 2/1/21     0914 0914 0914   Glucose   92    BUN 14  14    Creatinine 0.98  1.19 (A)    eGFR Non  Am 58 (A)  46 (A)    eGFR  Am   56 (A)    Sodium 140  141    Potassium 4.2  3.8    Chloride 106  105    Calcium 9.4  8.9    Total Protein   6.3    Albumin   4.10    Total Bilirubin   <0.2    Alkaline Phosphatase   83    AST (SGOT)   41 (A)    ALT (SGPT)   36 (A)    WBC  4.92     Hemoglobin  13.2     Hematocrit  41.5     Platelets  261     Total Cholesterol    264 (A)   Triglycerides    92   HDL Cholesterol    87 (A)   LDL Cholesterol     162 (A)   (A) Abnormal value       Comments are available for some flowsheets but are not being displayed.             HLD.  Increased cholesterol at her baseline.  She follows a good diet.    OP.  She is maintained on Fosamax.        HEALTH RISK ASSESSMENT    Recent Hospitalizations:  No hospitalization(s) within the last year.    Current Medical Providers:  Patient Care Team:  Rachael Oconnell MD as PCP - General (Internal Medicine)  Maci Jennings MD as Consulting Physician (Obstetrics and Gynecology)    Smoking Status:  Social History     Tobacco Use   Smoking Status Former Smoker   • Years: 2.00   • Start date:    • Quit date:    • Years since quittin.1   Smokeless Tobacco Never Used       Alcohol Consumption:  Social History     Substance and Sexual Activity   Alcohol Use No       Depression Screen:   PHQ-2/PHQ-9 Depression Screening 2021   Little interest or pleasure in doing things 0   Feeling down, depressed, or  hopeless 0   Trouble falling or staying asleep, or sleeping too much -   Feeling tired or having little energy -   Poor appetite or overeating -   Feeling bad about yourself - or that you are a failure or have let yourself or your family down -   Trouble concentrating on things, such as reading the newspaper or watching television -   Moving or speaking so slowly that other people could have noticed. Or the opposite - being so fidgety or restless that you have been moving around a lot more than usual -   Thoughts that you would be better off dead, or of hurting yourself in some way -   Total Score 0   If you checked off any problems, how difficult have these problems made it for you to do your work, take care of things at home, or get along with other people? -       Fall Risk Screen:  JOHN Fall Risk Assessment has not been completed.    Health Habits and Functional and Cognitive Screening:  Functional & Cognitive Status 2/5/2021   Do you have difficulty preparing food and eating? No   Do you have difficulty bathing yourself, getting dressed or grooming yourself? No   Do you have difficulty using the toilet? No   Do you have difficulty moving around from place to place? No   Do you have trouble with steps or getting out of a bed or a chair? No   Current Diet Well Balanced Diet   Dental Exam Up to date   Eye Exam Up to date   Exercise (times per week) 3 times per week   Current Exercise Activities Include (No Data)   Do you need help using the phone?  No   Are you deaf or do you have serious difficulty hearing?  No   Do you need help with transportation? No   Do you need help shopping? No   Do you need help preparing meals?  No   Do you need help with housework?  No   Do you need help with laundry? No   Do you need help taking your medications? No   Do you need help managing money? No   Do you ever drive or ride in a car without wearing a seat belt? No   Have you felt unusual stress, anger or loneliness in the last  month? No   Who do you live with? Sibling   If you need help, do you have trouble finding someone available to you? No   Have you been bothered in the last four weeks by sexual problems? No   Do you have difficulty concentrating, remembering or making decisions? No         Does the patient have evidence of cognitive impairment? No    Asprin use counseling:Does not need ASA (and currently is not on it)    Age-appropriate Screening Schedule:  Refer to the list below for future screening recommendations based on patient's age, sex and/or medical conditions. Orders for these recommended tests are listed in the plan section. The patient has been provided with a written plan.    Health Maintenance   Topic Date Due   • DXA SCAN  04/13/2019   • ZOSTER VACCINE (2 of 2) 06/25/2019   • PAP SMEAR  08/31/2019   • INFLUENZA VACCINE  08/01/2020   • MAMMOGRAM  11/01/2020   • LIPID PANEL  02/01/2022   • COLONOSCOPY  09/10/2024   • TDAP/TD VACCINES (4 - Td) 04/24/2027          The following portions of the patient's history were reviewed and updated as appropriate: allergies, current medications, past family history, past medical history, past social history, past surgical history and problem list.    Outpatient Medications Prior to Visit   Medication Sig Dispense Refill   • alendronate (FOSAMAX) 70 MG tablet TAKE 1 TABLET BY MOUTH 1 TIME WEEKLY AS DIRECTED 12 tablet 0   • azelaic acid (AZELEX) 15 % gel azelaic acid 15 % topical gel     • baclofen (LIORESAL) 10 MG tablet Take by mouth 3 (three) times a day as needed. 1 to 2 tablets     • Cholecalciferol (VITAMIN D3) 400 UNITS capsule Take by mouth.     • Cod Liver Oil 10 MINIM capsule Take by mouth.     • diazepam (VALIUM) 5 MG tablet Take 1 tablet by mouth as needed.     • Estriol powder      • Hormone Cream Base cream      • Ivermectin powder      • lidocaine (LIDODERM) 5 % Apply 1 patch topically as needed.     • Omega-3 Fatty Acids (FISH OIL PO) Take by mouth.     • oxyCODONE  "(ROXICODONE) 5 MG immediate release tablet Take 1 to 2 tablets every 4 to 6 hours as needed for pain     • PREBIOTIC PRODUCT PO Take  by mouth.     • Probiotic Product (PROBIOTIC ADVANCED PO) Take  by mouth.     • Progesterone Micronized (PROGESTERONE, BULK,) powder Place on the skin.       • promethazine (PHENERGAN) 25 MG tablet   2   • traMADol (ULTRAM) 50 MG tablet Take by mouth.     • Cannabidiol 100 MG/ML solution cannabidiol (CBD) 100 mg/mL oral solution   Take by oral route.     • metroNIDAZOLE (METROCREAM) 0.75 % cream   6   • sulfamethoxazole-trimethoprim (Bactrim DS) 800-160 MG per tablet Take 1 tablet by mouth 2 (Two) Times a Day. 14 tablet 0     No facility-administered medications prior to visit.        Patient Active Problem List   Diagnosis   • Tinnitus   • Atopic rhinitis   • Neck pain   • Chronic interstitial cystitis   • Degeneration of cervical intervertebral disc   • Hyperlipidemia   • Osteoporosis   • Pelvic floor dysfunction   • Acquired hypothyroidism   • Ulnar neuropathy of both upper extremities   • Carpal tunnel syndrome of right wrist   • Cervical spondylosis   • Cubital tunnel syndrome   • Hyperacusis, bilateral   • Osteoarthritis (arthritis due to wear and tear of joints)   • Degeneration of lumbar intervertebral disc       Advanced Care Planning:  ACP discussion was held with the patient during this visit. Patient has an advance directive (not in EMR), copy requested.    Review of Systems    Compared to one year ago, the patient feels her physical health is the same.  Compared to one year ago, the patient feels her mental health is the same.    Reviewed chart for potential of high risk medication in the elderly: yes  Reviewed chart for potential of harmful drug interactions in the elderly:yes    Objective         Vitals:    02/05/21 1122   BP: 134/78   Pulse: 66   SpO2: 95%   Weight: 49.9 kg (110 lb)   Height: 160 cm (62.99\")   PainSc: 0-No pain       Body mass index is 19.49 " kg/m².  Discussed the patient's BMI with her. The BMI is in the acceptable range.    Physical Exam    Lab Results   Component Value Date    GLU 92 02/01/2021    CHLPL 264 (H) 02/01/2021    TRIG 92 02/01/2021    HDL 87 (H) 02/01/2021     (H) 02/01/2021    VLDL 15 02/01/2021        Assessment/Plan   Medicare Risks and Personalized Health Plan  CMS Preventative Services Quick Reference  Immunizations Discussed/Encouraged (specific immunizations; covid-19 )    The above risks/problems have been discussed with the patient.  Pertinent information has been shared with the patient in the After Visit Summary.  Follow up plans and orders are seen below in the Assessment/Plan Section.    Diagnoses and all orders for this visit:    1. Medicare annual wellness visit, initial (Primary)    2. Osteoporosis, unspecified osteoporosis type, unspecified pathological fracture presence    3. Hyperlipidemia, unspecified hyperlipidemia type    HLD.   I recommend a diet high in fruits, vegetables, whole grains, lean meats, nuts and beans.  I recommend limiting red meat, full fat dairy, eggs and processed white carbohydrates.  I recommend aerobic exercise at least 3 days per week.   OP.  I recommend to get 1200 mg of calcium and 1000 IUs of vitamin D through diet and supplements and to participate in a weight based exercise to prevent loss of bone mineral density. Bone mineral will be monitored every two years.  Continue Fosamax.          Follow Up:  Return in about 1 year (around 2/5/2022).     An After Visit Summary and PPPS were given to the patient.

## 2021-02-09 NOTE — PROGRESS NOTES
Subjective   Patient ID: Leonie Becker is a 60 y.o. female is here today for follow-up. Patient was last seen in the office on 8/17/2020 with Dr. Wang De L aVega MD for neck and arm pain with extremity weakness and numbness. No new imaging.     Patient reports she is experiencing neck pain. Patient states the pain is in both sides mostly in left. The pain radiates down to her shoulder.  She states she has nerve pain in her left arm.    Its been 6 months since have seen her.  Since then she is undergone a left cubital tunnel release by Dr. Waldron not quite 3 months ago.  Apparently he did not transpose the nerve according to the patient.  She still having some symptoms of pain numbness and tingling into the distal left arm and fourth and fifth digit.  The tenderness in the area of the surgery has gotten better.  I told her not to worry too much.  Still early in Dr. Waldron did tell her that the nerve will take time to heal.  I reinforce that.  She is had some other symptoms since then.  Some numbness and tingling in the right index finger and thumb and even in the left.  She had documented cubital tunnel on the right and some carpal tunnel syndrome on the right but not on the left on an EMG done last year by Dr. Gan.  Today when I examined her, she does have a mildly positive Tinel's sign on the left.  She does not have what I would consider C6 symptoms but she does have some left upper arm and posterior shoulder problems which I think are probably attributable to her left C3-C4 nerve root compression from the disc protrusion.  I do not think the thumb and the forefinger symptoms are coming from the neck.  But mainly she is frustrated about the slowness of the ulnar nerve recovery.  I told her to give it some time.  But we will add some therapy for the neck.  She has been getting it for her hand and have her come back in 4 months.  I do not see a need to reimage her or even do another EMG at this  "time.        Neck Pain   The pain is present in the right side and left side. The quality of the pain is described as aching. The pain is at a severity of 4/10. The symptoms are aggravated by position. The pain is same all the time. Associated symptoms include headaches and tingling. Pertinent negatives include no fever, pain with swallowing or trouble swallowing. Treatments tried: physical therapy. The treatment provided mild relief.       The following portions of the patient's history were reviewed and updated as appropriate: allergies, current medications, past family history, past medical history, past social history, past surgical history and problem list.    Review of Systems   Constitutional: Negative for chills and fever.   HENT: Negative for congestion and trouble swallowing.    Musculoskeletal: Positive for neck pain and neck stiffness.   Neurological: Positive for dizziness, tingling, light-headedness and headaches.   All other systems reviewed and are negative.          Objective     Vitals:    02/15/21 0849   BP: 118/72   Temp: 99.6 °F (37.6 °C)   Weight: 48.9 kg (107 lb 12.8 oz)   Height: 160 cm (62.99\")     Body mass index is 19.1 kg/m².      Physical Exam  Constitutional:       Appearance: She is well-developed.   HENT:      Head: Normocephalic and atraumatic.   Eyes:      Extraocular Movements: EOM normal.      Conjunctiva/sclera: Conjunctivae normal.      Pupils: Pupils are equal, round, and reactive to light.   Neck:      Vascular: No carotid bruit.   Neurological:      Mental Status: She is oriented to person, place, and time.      Coordination: Finger-Nose-Finger Test and Heel to Shin Test normal.      Gait: Gait is intact.      Deep Tendon Reflexes:      Reflex Scores:       Tricep reflexes are 2+ on the right side and 2+ on the left side.       Bicep reflexes are 2+ on the right side and 2+ on the left side.       Brachioradialis reflexes are 2+ on the right side and 2+ on the left side.      "  Patellar reflexes are 2+ on the right side and 2+ on the left side.       Achilles reflexes are 2+ on the right side and 2+ on the left side.  Psychiatric:         Speech: Speech normal.       Neurologic Exam     Mental Status   Oriented to person, place, and time.   Registration of memory: Good recent and remote memory.   Attention: normal. Concentration: normal.   Speech: speech is normal   Level of consciousness: alert  Knowledge: consistent with education.     Cranial Nerves     CN II   Visual fields full to confrontation.   Visual acuity: normal    CN III, IV, VI   Pupils are equal, round, and reactive to light.  Extraocular motions are normal.     CN V   Facial sensation intact.   Right corneal reflex: normal  Left corneal reflex: normal    CN VII   Facial expression full, symmetric.   Right facial weakness: none  Left facial weakness: none    CN VIII   Hearing: intact    CN IX, X   Palate: symmetric    CN XI   Right sternocleidomastoid strength: normal  Left sternocleidomastoid strength: normal    CN XII   Tongue: not atrophic  Tongue deviation: none    Motor Exam   Muscle bulk: normal  Right arm tone: normal  Left arm tone: normal  Right leg tone: normal  Left leg tone: normal    Strength   Strength 5/5 except as noted.     Sensory Exam   Light touch normal.     Gait, Coordination, and Reflexes     Gait  Gait: normal    Coordination   Finger to nose coordination: normal  Heel to shin coordination: normal    Reflexes   Right brachioradialis: 2+  Left brachioradialis: 2+  Right biceps: 2+  Left biceps: 2+  Right triceps: 2+  Left triceps: 2+  Right patellar: 2+  Left patellar: 2+  Right achilles: 2+  Left achilles: 2+  Right : 2+  Left : 2+          Assessment/Plan   Independent Review of Radiographic Studies:      I personally reviewed the images from the following studies.    I reviewed the cervical MRI done on 8/7/2020 which shows no evidence of any root compression at C7-T1.  There is a  right-sided C6-C7 disc protrusion and on the left there is some foraminal stenosis with a disc protrusion at that level compressing the left C4 nerve root.  At C4-C5 and C5-C6 there is some disc bulging and very mild foraminal stenosis.  I do not think the stenosis at C5-C6 is very significant.  The worst of it is seems to be at C3-C4 towards the left.  Otherwise agree with the report    Medical Decision Making:      We will add some cervical PT to her regimen and have her come back in 4 months.  I think the symptoms in the left forefinger and thumb are peripheral given the physical exam noted above.  The left upper shoulder and upper arm pain come from the neck at C3-C4.  If there is lack of improvement, we might need to reimage the cervical spine.      Diagnoses and all orders for this visit:    1. Ulnar neuropathy of both upper extremities (Primary)    2. Carpal tunnel syndrome of right wrist    3. Degeneration of cervical intervertebral disc  -     Ambulatory Referral to Physical Therapy Evaluate and treat      Return in about 4 months (around 6/15/2021) for Face-to-face.

## 2021-02-10 ENCOUNTER — TELEPHONE (OUTPATIENT)
Dept: INTERNAL MEDICINE | Facility: CLINIC | Age: 61
End: 2021-02-10

## 2021-02-10 DIAGNOSIS — H93.13 TINNITUS OF BOTH EARS: Primary | ICD-10-CM

## 2021-02-10 NOTE — TELEPHONE ENCOUNTER
Patient would like to discuss referrals she is needing with Dr. Oconnell or assistant. Please call patient at [phone]

## 2021-02-12 ENCOUNTER — TELEPHONE (OUTPATIENT)
Dept: NEUROSURGERY | Facility: CLINIC | Age: 61
End: 2021-02-12

## 2021-02-15 ENCOUNTER — OFFICE VISIT (OUTPATIENT)
Dept: NEUROSURGERY | Facility: CLINIC | Age: 61
End: 2021-02-15

## 2021-02-15 VITALS
WEIGHT: 107.8 LBS | BODY MASS INDEX: 19.1 KG/M2 | TEMPERATURE: 99.6 F | SYSTOLIC BLOOD PRESSURE: 118 MMHG | DIASTOLIC BLOOD PRESSURE: 72 MMHG | HEIGHT: 63 IN

## 2021-02-15 DIAGNOSIS — G56.01 CARPAL TUNNEL SYNDROME OF RIGHT WRIST: ICD-10-CM

## 2021-02-15 DIAGNOSIS — M50.30 DEGENERATION OF CERVICAL INTERVERTEBRAL DISC: ICD-10-CM

## 2021-02-15 DIAGNOSIS — G56.23 ULNAR NEUROPATHY OF BOTH UPPER EXTREMITIES: Primary | ICD-10-CM

## 2021-02-15 PROCEDURE — 99214 OFFICE O/P EST MOD 30 MIN: CPT | Performed by: NEUROLOGICAL SURGERY

## 2021-03-01 ENCOUNTER — TELEPHONE (OUTPATIENT)
Dept: INTERNAL MEDICINE | Facility: CLINIC | Age: 61
End: 2021-03-01

## 2021-03-01 NOTE — TELEPHONE ENCOUNTER
Patient called in stating she needs a referral sent to a physicians office because she is needing a new audiogram. Patient has an appt on Wednesday and needs it sent over before then.     Please call patient at [phone]

## 2021-03-02 ENCOUNTER — HOSPITAL ENCOUNTER (OUTPATIENT)
Dept: CARDIOLOGY | Facility: HOSPITAL | Age: 61
Discharge: HOME | End: 2021-03-02
Admitting: INTERNAL MEDICINE

## 2021-03-02 ENCOUNTER — TELEPHONE (OUTPATIENT)
Dept: INTERNAL MEDICINE | Facility: CLINIC | Age: 61
End: 2021-03-02

## 2021-03-02 VITALS
HEART RATE: 54 BPM | BODY MASS INDEX: 18.61 KG/M2 | SYSTOLIC BLOOD PRESSURE: 109 MMHG | DIASTOLIC BLOOD PRESSURE: 59 MMHG | WEIGHT: 105 LBS | HEIGHT: 63 IN

## 2021-03-02 DIAGNOSIS — Z13.6 ENCOUNTER FOR SCREENING FOR VASCULAR DISEASE: ICD-10-CM

## 2021-03-02 DIAGNOSIS — H93.13 TINNITUS OF BOTH EARS: Primary | ICD-10-CM

## 2021-03-02 LAB
BH CV ECHO MEAS - DIST AO DIAM: 1.37 CM
BH CV VAS BP LEFT ARM: NORMAL MMHG
BH CV VAS BP RIGHT ARM: NORMAL MMHG
BH CV XLRA MEAS - MID AO DIAM: 1.64 CM
BH CV XLRA MEAS - PAD LEFT ABI DP: 1.18
BH CV XLRA MEAS - PAD LEFT ABI PT: 1.21
BH CV XLRA MEAS - PAD LEFT ARM: 109 MMHG
BH CV XLRA MEAS - PAD LEFT LEG DP: 129 MMHG
BH CV XLRA MEAS - PAD LEFT LEG PT: 132 MMHG
BH CV XLRA MEAS - PAD RIGHT ABI DP: 1.17
BH CV XLRA MEAS - PAD RIGHT ABI PT: 1.25
BH CV XLRA MEAS - PAD RIGHT ARM: 109 MMHG
BH CV XLRA MEAS - PAD RIGHT LEG DP: 127 MMHG
BH CV XLRA MEAS - PAD RIGHT LEG PT: 136 MMHG
BH CV XLRA MEAS - PROX AO DIAM: 1.79 CM
BH CV XLRA MEAS LEFT ICA/CCA RATIO: 1.39
BH CV XLRA MEAS LEFT MID CCA PSV: NORMAL CM/SEC
BH CV XLRA MEAS LEFT MID ICA PSV: NORMAL CM/SEC
BH CV XLRA MEAS LEFT PROX ECA PSV: NORMAL CM/SEC
BH CV XLRA MEAS RIGHT ICA/CCA RATIO: 1.32
BH CV XLRA MEAS RIGHT MID CCA PSV: NORMAL CM/SEC
BH CV XLRA MEAS RIGHT MID ICA PSV: NORMAL CM/SEC
BH CV XLRA MEAS RIGHT PROX ECA PSV: NORMAL CM/SEC

## 2021-03-02 PROCEDURE — 93799 UNLISTED CV SVC/PROCEDURE: CPT

## 2021-03-02 NOTE — TELEPHONE ENCOUNTER
Faxed referral to requested office and left voice mail for patient letting her know referral faxed

## 2021-03-02 NOTE — TELEPHONE ENCOUNTER
Patient needs a referral sent to     [facility]  [address], [place] [id number]  [phone]    Patient's appointment is 03/03 at 10:30AM  Patient had surgery on 10/27 and doctor thinks an audiogram is needed.

## 2021-03-05 ENCOUNTER — OFFICE VISIT (OUTPATIENT)
Dept: INTERNAL MEDICINE | Facility: CLINIC | Age: 61
End: 2021-03-05

## 2021-03-05 VITALS
HEART RATE: 69 BPM | DIASTOLIC BLOOD PRESSURE: 84 MMHG | SYSTOLIC BLOOD PRESSURE: 118 MMHG | BODY MASS INDEX: 18.78 KG/M2 | WEIGHT: 106 LBS | OXYGEN SATURATION: 98 % | HEIGHT: 63 IN

## 2021-03-05 DIAGNOSIS — H93.13 TINNITUS OF BOTH EARS: Primary | ICD-10-CM

## 2021-03-05 PROCEDURE — 99212 OFFICE O/P EST SF 10 MIN: CPT | Performed by: INTERNAL MEDICINE

## 2021-03-05 NOTE — PROGRESS NOTES
Devonte Becker is a 60 y.o. female who presents with   Chief Complaint   Patient presents with   • Consult     covid vaccine        History of Present Illness     Patient is concerned about getting COVID-19 shot.  She got a Shingrix shot and it severely exacerbated her baseline tinnitus.  She has never returned to normal.  She is interested in any experiences I have had with patient getting tinnitus after these COVID immunizations.      Review of Systems   Constitutional: Negative for fever.   Respiratory: Negative.    Cardiovascular: Negative.        The following portions of the patient's history were reviewed and updated as appropriate: allergies, current medications and problem list.    Patient Active Problem List    Diagnosis Date Noted   • Cubital tunnel syndrome 09/08/2020   • Ulnar neuropathy of both upper extremities 08/17/2020   • Carpal tunnel syndrome of right wrist 08/17/2020   • Cervical spondylosis 01/21/2020   • Degeneration of lumbar intervertebral disc 01/21/2020   • Acquired hypothyroidism 02/08/2019   • Hyperacusis, bilateral 07/16/2018   • Pelvic floor dysfunction 03/24/2016   • Atopic rhinitis 03/23/2016   • Neck pain 03/23/2016   • Chronic interstitial cystitis 03/23/2016     Note Last Updated: 3/23/2016     Description: Extreme case followed by urologist at  as well as Dr. Mendez Franz.     • Degeneration of cervical intervertebral disc 03/23/2016   • Hyperlipidemia 03/23/2016     Note Last Updated: 1/28/2020     Description: 1.9% 10 year risk of ASCVD in 12/8/14; 1.6% on 3/23/16, 1.5% 10 year risk 3/28/2017;  2.1% 10 year risk.  2.8% 10 year risk o 1/28/2020.      • Osteoporosis 03/23/2016     Note Last Updated: 1/25/2019     Start in 4/2017     • Tinnitus 02/01/2016   • Osteoarthritis (arthritis due to wear and tear of joints) 03/12/2012       Current Outpatient Medications on File Prior to Visit   Medication Sig Dispense Refill   • alendronate (FOSAMAX) 70 MG tablet TAKE  "1 TABLET BY MOUTH 1 TIME WEEKLY AS DIRECTED 12 tablet 0   • azelaic acid (AZELEX) 15 % gel azelaic acid 15 % topical gel     • baclofen (LIORESAL) 10 MG tablet Take by mouth 3 (three) times a day as needed. 1 to 2 tablets     • Cholecalciferol (VITAMIN D3) 400 UNITS capsule Take by mouth.     • Cod Liver Oil 10 MINIM capsule Take by mouth.     • diazepam (VALIUM) 5 MG tablet Take 1 tablet by mouth as needed.     • Estriol powder      • Hormone Cream Base cream      • Ivermectin powder      • lidocaine (LIDODERM) 5 % Apply 1 patch topically as needed.     • Omega-3 Fatty Acids (FISH OIL PO) Take by mouth.     • oxyCODONE (ROXICODONE) 5 MG immediate release tablet Take 1 to 2 tablets every 4 to 6 hours as needed for pain     • PREBIOTIC PRODUCT PO Take  by mouth.     • Probiotic Product (PROBIOTIC ADVANCED PO) Take  by mouth.     • Progesterone Micronized (PROGESTERONE, BULK,) powder Place on the skin.       • promethazine (PHENERGAN) 25 MG tablet   2   • traMADol (ULTRAM) 50 MG tablet Take by mouth.       No current facility-administered medications on file prior to visit.        Objective     /84   Pulse 69   Ht 160 cm (62.99\")   Wt 48.1 kg (106 lb)   SpO2 98%   BMI 18.78 kg/m²     Physical Exam  Constitutional:       Appearance: She is well-developed.   HENT:      Head: Normocephalic and atraumatic.   Pulmonary:      Effort: Pulmonary effort is normal.   Neurological:      Mental Status: She is alert and oriented to person, place, and time.   Psychiatric:         Behavior: Behavior normal.         Assessment/Plan   Diagnoses and all orders for this visit:    1. Tinnitus of both ears (Primary)        Discussion    Patient is having about getting the COVID-19 vaccinations.  I will do some research regarding whether it causing increases in tinnitus.  I will give her a follow up call regarding.         Future Appointments   Date Time Provider Department Center   6/14/2021  8:30 AM Wang De La Vega MD [facility]   2/1/2022 10:10 AM [facility]   2/8/2022 10:00 AM Rachael Oconnell MD [facility]

## 2021-03-16 ENCOUNTER — BULK ORDERING (OUTPATIENT)
Dept: CASE MANAGEMENT | Facility: OTHER | Age: 61
End: 2021-03-16

## 2021-03-16 DIAGNOSIS — Z23 IMMUNIZATION DUE: ICD-10-CM

## 2021-03-18 ENCOUNTER — IMMUNIZATION (OUTPATIENT)
Dept: VACCINE CLINIC | Facility: HOSPITAL | Age: 61
End: 2021-03-18

## 2021-03-18 PROCEDURE — 0001A: CPT | Performed by: INTERNAL MEDICINE

## 2021-03-18 PROCEDURE — 91300 HC SARSCOV02 VAC 30MCG/0.3ML IM: CPT | Performed by: INTERNAL MEDICINE

## 2021-04-08 ENCOUNTER — IMMUNIZATION (OUTPATIENT)
Dept: VACCINE CLINIC | Facility: HOSPITAL | Age: 61
End: 2021-04-08

## 2021-04-08 PROCEDURE — 91300 HC SARSCOV02 VAC 30MCG/0.3ML IM: CPT | Performed by: INTERNAL MEDICINE

## 2021-04-08 PROCEDURE — 0002A: CPT | Performed by: INTERNAL MEDICINE

## 2021-05-10 ENCOUNTER — APPOINTMENT (OUTPATIENT)
Dept: WOMENS IMAGING | Facility: HOSPITAL | Age: 61
End: 2021-05-10

## 2021-05-10 PROCEDURE — 77080 DXA BONE DENSITY AXIAL: CPT | Performed by: RADIOLOGY

## 2021-05-21 ENCOUNTER — OFFICE VISIT (OUTPATIENT)
Dept: INTERNAL MEDICINE | Facility: CLINIC | Age: 61
End: 2021-05-21

## 2021-05-21 VITALS
SYSTOLIC BLOOD PRESSURE: 100 MMHG | BODY MASS INDEX: 19.14 KG/M2 | HEART RATE: 67 BPM | WEIGHT: 108 LBS | OXYGEN SATURATION: 97 % | DIASTOLIC BLOOD PRESSURE: 78 MMHG | HEIGHT: 63 IN

## 2021-05-21 DIAGNOSIS — M81.0 OSTEOPOROSIS, UNSPECIFIED OSTEOPOROSIS TYPE, UNSPECIFIED PATHOLOGICAL FRACTURE PRESENCE: Primary | ICD-10-CM

## 2021-05-21 DIAGNOSIS — M62.89 PELVIC FLOOR DYSFUNCTION IN FEMALE: ICD-10-CM

## 2021-05-21 DIAGNOSIS — N30.10 INTERSTITIAL CYSTITIS: ICD-10-CM

## 2021-05-21 PROCEDURE — 99213 OFFICE O/P EST LOW 20 MIN: CPT | Performed by: INTERNAL MEDICINE

## 2021-05-21 NOTE — PROGRESS NOTES
Devonte Becker is a 60 y.o. female who presents with   Chief Complaint   Patient presents with   • Osteoporosis       History of Present Illness     OP f/u.  She is tolerating Fosamax.  She is on year four of this med.  She is having trouble finding calcium containing foods she can tolerate with her IC.  DEXA from 5/19/2021 is reviewed which does show improvement in her BMD of the spine.      Review of Systems   Respiratory: Negative.    Cardiovascular: Negative.        The following portions of the patient's history were reviewed and updated as appropriate: allergies, current medications and problem list.    Patient Active Problem List    Diagnosis Date Noted   • Cubital tunnel syndrome 09/08/2020   • Ulnar neuropathy of both upper extremities 08/17/2020   • Carpal tunnel syndrome of right wrist 08/17/2020   • Cervical spondylosis 01/21/2020   • Degeneration of lumbar intervertebral disc 01/21/2020   • Acquired hypothyroidism 02/08/2019   • Hyperacusis, bilateral 07/16/2018   • Pelvic floor dysfunction 03/24/2016   • Atopic rhinitis 03/23/2016   • Neck pain 03/23/2016   • Chronic interstitial cystitis 03/23/2016     Note Last Updated: 3/23/2016     Description: Extreme case followed by urologist at  as well as Dr. Mendez Franz.     • Degeneration of cervical intervertebral disc 03/23/2016   • Hyperlipidemia 03/23/2016     Note Last Updated: 1/28/2020     Description: 1.9% 10 year risk of ASCVD in 12/8/14; 1.6% on 3/23/16, 1.5% 10 year risk 3/28/2017;  2.1% 10 year risk.  2.8% 10 year risk o 1/28/2020.      • Osteoporosis 03/23/2016     Note Last Updated: 1/25/2019     Start in 4/2017     • Tinnitus 02/01/2016   • Osteoarthritis (arthritis due to wear and tear of joints) 03/12/2012       Current Outpatient Medications on File Prior to Visit   Medication Sig Dispense Refill   • alendronate (FOSAMAX) 70 MG tablet TAKE 1 TABLET BY MOUTH 1 TIME WEEKLY AS DIRECTED 12 tablet 0   • baclofen (LIORESAL) 10  "MG tablet Take by mouth 3 (three) times a day as needed. 1 to 2 tablets     • Cholecalciferol (VITAMIN D3) 400 UNITS capsule Take by mouth.     • Cod Liver Oil 10 MINIM capsule Take by mouth.     • diazepam (VALIUM) 5 MG tablet Take 1 tablet by mouth as needed.     • Estriol powder      • Hormone Cream Base cream      • Ivermectin powder      • lidocaine (LIDODERM) 5 % Apply 1 patch topically as needed.     • Omega-3 Fatty Acids (FISH OIL PO) Take by mouth.     • oxyCODONE (ROXICODONE) 5 MG immediate release tablet Take 1 to 2 tablets every 4 to 6 hours as needed for pain     • PREBIOTIC PRODUCT PO Take  by mouth.     • Probiotic Product (PROBIOTIC ADVANCED PO) Take  by mouth.     • Progesterone Micronized (PROGESTERONE, BULK,) powder Place on the skin.       • promethazine (PHENERGAN) 25 MG tablet   2   • traMADol (ULTRAM) 50 MG tablet Take by mouth.     • [DISCONTINUED] azelaic acid (AZELEX) 15 % gel azelaic acid 15 % topical gel       No current facility-administered medications on file prior to visit.       Objective     /78   Pulse 67   Ht 160 cm (62.99\")   Wt 49 kg (108 lb)   SpO2 97%   BMI 19.14 kg/m²     Physical Exam  Constitutional:       Appearance: She is well-developed.   HENT:      Head: Normocephalic and atraumatic.   Pulmonary:      Effort: Pulmonary effort is normal.   Neurological:      Mental Status: She is alert and oriented to person, place, and time.   Psychiatric:         Behavior: Behavior normal.         Assessment/Plan   Diagnoses and all orders for this visit:    1. Pelvic floor dysfunction in female (Primary)  -     Ambulatory Referral to Physical Therapy Evaluate and treat    2. Interstitial cystitis  -     Ambulatory Referral to Physical Therapy Evaluate and treat    3. Osteoporosis, unspecified osteoporosis type, unspecified pathological fracture presence        Discussion    OP.  F/u.  Continue to do her back to get calcium containing foods in her diet.  I recommend to " participate in a weight based exercise to prevent loss of bone mineral density. Bone mineral will be monitored every two years.  Continue Fosamax for a total of five years.           Future Appointments   Date Time Provider Department Center   6/14/2021  8:30 AM Wang De La Vega MD [facility]   2/1/2022 10:10 AM [facility]   2/8/2022 10:00 AM Rachael Oconnell MD [facility]         Answers for HPI/ROS submitted by the patient on 5/20/2021  Please describe your symptoms.: Bone Density Report evaluation and recent worsening of Interstitial Cystitis/bladder sensitivity that is interfering with calcium intake, which may have major impact on bone density.  Have you had these symptoms before?: No  How long have you been having these symptoms?: Greater than 2 weeks  Please list any medications you are currently taking for this condition.: None  Please describe any probable cause for these symptoms. : Unknown  What is the primary reason for your visit?: Other

## 2021-05-26 RX ORDER — ALENDRONATE SODIUM 70 MG/1
TABLET ORAL
Qty: 12 TABLET | Refills: 0 | Status: SHIPPED | OUTPATIENT
Start: 2021-05-26 | End: 2021-08-16

## 2021-06-22 NOTE — PROGRESS NOTES
Subjective   Patient ID: Leonie Becker is a 60 y.o. female is here today for follow-up. Ms. Becker was last seen on 02-15-21 with complaints of neck pain and left arm pain.    Today, Ms. Becker reports constant neck pain but the severity changes. She reports left shoulder, arm pain and hand pain. She went to one physical therapy session for her neck.  She denies tingling and weakness. She reports mild weakness in her left hand.     This patient is with her sister.  Its been about 8 months since her ulnar release and she still has some residual nerve symptoms.  She did say that it overall was better but she still cannot play the guitar very much because it aggravates the pain in her distal arm and her fourth and fifth digit.  There is a little bit of the pain that projection retrograde.  I reviewed what I thought was the contribution from the cervical spine which is related to a left C3-C4 disc osteophyte complex.  She has some C4 pain in the shoulder and in the trapezius area even in the upper arm.  Some of her movements in her neck can reproduce that which supports that idea that the neck is compressing the left C4 root.  However there really was not any evidence of anything at C7-T1 which if present can oftentimes mimic the symptoms from an ulnar entrapment.  So I think the pain from the elbow down into the fourth and fifth digits is still exclusively from the ulnar nerve.  When I saw her last she did try physical therapy for 1 session but it hurt too much of the time.  She is willing to give it another try and I think we should continue to work on conservative treatments for the neck.  She has had epidural blocks before and is not unwilling to try it but I do would like to avoid that in this patient right now.  As far as the residual symptoms from the ulnar nerve goes, I suggested she try some gabapentin which she has tried before for her interstitial cystitis.  But she has not been on it for a while.  I think it  "is reasonable to do it to try and help with some of the residual ulnar symptoms at a low dose of 100 mg twice daily so we will try that and I asked her to let us know how she is doing with it in about 6 weeks.  She is also having some issues with her interstitial cystitis and has been going to therapy for that and even doing acupuncture.  I will see her in 4 months which is the year olga from her ulnar surgery after she is had more therapy and try the gabapentin.    If there is no improvement then I would suggest reimaging the neck.  I suggested that she continue to follow-up with Dr. Magallon in regards to the residual ulnar symptoms.        Neck Pain   The current episode started more than 1 month ago. The problem occurs constantly. The problem has been gradually worsening. The pain is present in the left side and midline. The quality of the pain is described as shooting and aching. The symptoms are aggravated by position. Associated symptoms include numbness. Pertinent negatives include no chest pain, fever, tingling or weakness.       The following portions of the patient's history were reviewed and updated as appropriate: allergies, current medications, past family history, past medical history, past social history, past surgical history and problem list.    Review of Systems   Constitutional: Negative for fever.   Respiratory: Negative for chest tightness and shortness of breath.    Cardiovascular: Negative for chest pain.   Musculoskeletal: Positive for neck pain.   Neurological: Positive for numbness. Negative for tingling and weakness.   All other systems reviewed and are negative.          Objective     Vitals:    06/26/21 0858   BP: 138/74   Temp: 98 °F (36.7 °C)   Weight: 49 kg (108 lb)   Height: 160 cm (62.99\")     Body mass index is 19.14 kg/m².      Physical Exam  Constitutional:       Appearance: She is well-developed.   HENT:      Head: Normocephalic and atraumatic.   Eyes:      Extraocular " Movements: EOM normal.      Conjunctiva/sclera: Conjunctivae normal.      Pupils: Pupils are equal, round, and reactive to light.   Neck:      Vascular: No carotid bruit.   Neurological:      Mental Status: She is oriented to person, place, and time.      Coordination: Finger-Nose-Finger Test and Heel to Shin Test normal.      Gait: Gait is intact.      Deep Tendon Reflexes:      Reflex Scores:       Tricep reflexes are 2+ on the right side and 2+ on the left side.       Bicep reflexes are 2+ on the right side and 2+ on the left side.       Brachioradialis reflexes are 2+ on the right side and 2+ on the left side.       Patellar reflexes are 2+ on the right side and 2+ on the left side.       Achilles reflexes are 2+ on the right side and 2+ on the left side.  Psychiatric:         Speech: Speech normal.       Neurologic Exam     Mental Status   Oriented to person, place, and time.   Registration of memory: Good recent and remote memory.   Attention: normal. Concentration: normal.   Speech: speech is normal   Level of consciousness: alert  Knowledge: consistent with education.     Cranial Nerves     CN II   Visual fields full to confrontation.   Visual acuity: normal    CN III, IV, VI   Pupils are equal, round, and reactive to light.  Extraocular motions are normal.     CN V   Facial sensation intact.   Right corneal reflex: normal  Left corneal reflex: normal    CN VII   Facial expression full, symmetric.   Right facial weakness: none  Left facial weakness: none    CN VIII   Hearing: intact    CN IX, X   Palate: symmetric    CN XI   Right sternocleidomastoid strength: normal  Left sternocleidomastoid strength: normal    CN XII   Tongue: not atrophic  Tongue deviation: none    Motor Exam   Muscle bulk: normal  Right arm tone: normal  Left arm tone: normal  Right leg tone: normal  Left leg tone: normal    Strength   Strength 5/5 except as noted.     Sensory Exam   Light touch normal.     Gait, Coordination, and  Reflexes     Gait  Gait: normal    Coordination   Finger to nose coordination: normal  Heel to shin coordination: normal    Reflexes   Right brachioradialis: 2+  Left brachioradialis: 2+  Right biceps: 2+  Left biceps: 2+  Right triceps: 2+  Left triceps: 2+  Right patellar: 2+  Left patellar: 2+  Right achilles: 2+  Left achilles: 2+  Right : 2+  Left : 2+          Assessment/Plan   Independent Review of Radiographic Studies:      I personally reviewed the images from the following studies.    I reviewed the cervical MRI done on 8/7/2020 which shows no evidence of any root compression at C7-T1.  There is a right-sided C6-C7 disc protrusion and on the left there is some foraminal stenosis with a disc protrusion at that level compressing the left C4 nerve root.  At C4-C5 and C5-C6 there is some disc bulging and very mild foraminal stenosis.  I do not think the stenosis at C5-C6 is very significant.  The worst of it is seems to be at C3-C4 towards the left.  Otherwise agree with the report    Medical Decision Making:      We will go ahead and set up the cervical PT again and start some low-dose gabapentin at 100 mg twice daily.  I asked her to call in about 6 weeks to let us know if the left elbow and arm respond to it.  If there are no side effects and its not helping much we can gradually raise the dose.  Otherwise I will see her in 4 months which is about the year anniversary of her ulnar surgery.      Diagnoses and all orders for this visit:    1. Neck pain (Primary)  -     Ambulatory Referral to Physical Therapy Evaluate and treat    2. Ulnar neuropathy of both upper extremities  -     gabapentin (NEURONTIN) 100 MG capsule; Take 100 mg p.o. nightly x2 weeks then twice daily  Dispense: 60 capsule; Refill: 3    3. Displacement of cervical intervertebral disc without myelopathy  -     Ambulatory Referral to Physical Therapy Evaluate and treat      Return in about 4 months (around 10/26/2021) for  Face-to-face.

## 2021-06-26 ENCOUNTER — OFFICE VISIT (OUTPATIENT)
Dept: NEUROSURGERY | Facility: CLINIC | Age: 61
End: 2021-06-26

## 2021-06-26 VITALS
HEIGHT: 63 IN | SYSTOLIC BLOOD PRESSURE: 138 MMHG | TEMPERATURE: 98 F | BODY MASS INDEX: 19.14 KG/M2 | DIASTOLIC BLOOD PRESSURE: 74 MMHG | WEIGHT: 108 LBS

## 2021-06-26 DIAGNOSIS — G56.23 ULNAR NEUROPATHY OF BOTH UPPER EXTREMITIES: ICD-10-CM

## 2021-06-26 DIAGNOSIS — M54.2 NECK PAIN: Primary | ICD-10-CM

## 2021-06-26 DIAGNOSIS — M50.20 DISPLACEMENT OF CERVICAL INTERVERTEBRAL DISC WITHOUT MYELOPATHY: ICD-10-CM

## 2021-06-26 PROCEDURE — 99214 OFFICE O/P EST MOD 30 MIN: CPT | Performed by: NEUROLOGICAL SURGERY

## 2021-06-26 RX ORDER — GABAPENTIN 100 MG/1
CAPSULE ORAL
Qty: 60 CAPSULE | Refills: 3 | Status: SHIPPED | OUTPATIENT
Start: 2021-06-26 | End: 2022-02-08

## 2021-06-28 ENCOUNTER — TELEPHONE (OUTPATIENT)
Dept: NEUROSURGERY | Facility: CLINIC | Age: 61
End: 2021-06-28

## 2021-06-28 NOTE — TELEPHONE ENCOUNTER
PATIENT WOULD LIKE TO GO TO [facility] AT THE [facility] FOR PT TREATMENT.  PLEASE SEND REFERRAL.  ANY QUESTIONS PLEASE CALL PATIENT.      [phone]

## 2021-07-14 ENCOUNTER — TELEPHONE (OUTPATIENT)
Dept: INTERNAL MEDICINE | Facility: CLINIC | Age: 61
End: 2021-07-14

## 2021-07-14 NOTE — TELEPHONE ENCOUNTER
DR RUVALCABA PLEASE CALL PATIENT AT   Fears, Leonie FELICITAS (Self) [phone] (M)       REGARDING:   Chronic interstitial cystitis      6-24-21 UROLOGY

## 2021-07-30 ENCOUNTER — TELEMEDICINE (OUTPATIENT)
Dept: INTERNAL MEDICINE | Facility: CLINIC | Age: 61
End: 2021-07-30

## 2021-07-30 DIAGNOSIS — M81.0 OSTEOPOROSIS, UNSPECIFIED OSTEOPOROSIS TYPE, UNSPECIFIED PATHOLOGICAL FRACTURE PRESENCE: ICD-10-CM

## 2021-07-30 DIAGNOSIS — N30.10 CHRONIC INTERSTITIAL CYSTITIS: Primary | ICD-10-CM

## 2021-07-30 PROCEDURE — 99213 OFFICE O/P EST LOW 20 MIN: CPT | Performed by: INTERNAL MEDICINE

## 2021-07-30 NOTE — PROGRESS NOTES
Devonte Becker is a 61 y.o. female who presents with   Chief Complaint   Patient presents with   • bladder problems       History of Present Illness     You have chosen to receive care through a telehealth visit.  Do you consent to use a video/audio connection for your medical care today? Yes.    Chronic interstitial cystitis.  F/u on this ongoing issue.  She has seen several subspecialties.    She is doing pelvic floor therapy.  Bladder removal is not something being recommended at this time.  We discussed diet.        Review of Systems   Respiratory: Negative.    Cardiovascular: Negative.    Genitourinary: Positive for pelvic pain.       The following portions of the patient's history were reviewed and updated as appropriate: allergies, current medications and problem list.    Patient Active Problem List    Diagnosis Date Noted   • Cubital tunnel syndrome 09/08/2020   • Ulnar neuropathy of both upper extremities 08/17/2020   • Carpal tunnel syndrome of right wrist 08/17/2020   • Cervical spondylosis 01/21/2020   • Degeneration of lumbar intervertebral disc 01/21/2020   • Acquired hypothyroidism 02/08/2019   • Hyperacusis, bilateral 07/16/2018   • Pelvic floor dysfunction 03/24/2016   • Atopic rhinitis 03/23/2016   • Neck pain 03/23/2016   • Chronic interstitial cystitis 03/23/2016     Note Last Updated: 3/23/2016     Description: Extreme case followed by urologist at  as well as Dr. Mendez Franz.     • Degeneration of cervical intervertebral disc 03/23/2016   • Hyperlipidemia 03/23/2016     Note Last Updated: 1/28/2020     Description: 1.9% 10 year risk of ASCVD in 12/8/14; 1.6% on 3/23/16, 1.5% 10 year risk 3/28/2017;  2.1% 10 year risk.  2.8% 10 year risk o 1/28/2020.      • Osteoporosis 03/23/2016     Note Last Updated: 1/25/2019     Start in 4/2017     • Tinnitus 02/01/2016   • Osteoarthritis (arthritis due to wear and tear of joints) 03/12/2012       Current Outpatient Medications on File  Prior to Visit   Medication Sig Dispense Refill   • alendronate (FOSAMAX) 70 MG tablet TAKE 1 TABLET BY MOUTH 1 TIME WEEKLY AS DIRECTED 12 tablet 0   • baclofen (LIORESAL) 10 MG tablet Take by mouth 3 (three) times a day as needed. 1 to 2 tablets     • Cholecalciferol (VITAMIN D3) 400 UNITS capsule Take by mouth.     • Cod Liver Oil 10 MINIM capsule Take by mouth.     • diazepam (VALIUM) 5 MG tablet Take 1 tablet by mouth as needed.     • Estriol powder      • gabapentin (NEURONTIN) 100 MG capsule Take 100 mg p.o. nightly x2 weeks then twice daily 60 capsule 3   • Hormone Cream Base cream      • Ivermectin powder      • lidocaine (LIDODERM) 5 % Apply 1 patch topically as needed.     • Omega-3 Fatty Acids (FISH OIL PO) Take by mouth.     • oxyCODONE (ROXICODONE) 5 MG immediate release tablet Take 1 to 2 tablets every 4 to 6 hours as needed for pain     • PREBIOTIC PRODUCT PO Take  by mouth.     • Probiotic Product (PROBIOTIC ADVANCED PO) Take  by mouth.     • Progesterone Micronized (PROGESTERONE, BULK,) powder Place on the skin.       • promethazine (PHENERGAN) 25 MG tablet   2   • traMADol (ULTRAM) 50 MG tablet Take by mouth.       No current facility-administered medications on file prior to visit.       Objective     There were no vitals taken for this visit.    Physical Exam  Constitutional:       Appearance: She is well-developed.   HENT:      Head: Normocephalic and atraumatic.   Pulmonary:      Effort: Pulmonary effort is normal.   Neurological:      Mental Status: She is alert and oriented to person, place, and time.   Psychiatric:         Behavior: Behavior normal.         Assessment/Plan   Diagnoses and all orders for this visit:    1. Chronic interstitial cystitis (Primary)        Discussion    F/u chronic interstitial cystitis.    She wanted to update me on her treatment.   She is doing PT.    Considering acupuncture.    She is seeing a subspecialist in September to consider interstim.    She has been  unable to tolerate calcium or dairy.  She will continue to try to get calcium in her diet for her bones.    Continue Fosamax for osteoporosis.    I spent 22 minutes caring for Leonie on this date of service. This time includes time spent by me in the following activities: preparing for the visit, performing a medically appropriate examination and/or evaluation and documenting information in the medical record.          Future Appointments   Date Time Provider Department Center   10/25/2021 12:30 PM Wang De La Vega MD [facility]   2/1/2022 10:10 AM [facility]   2/8/2022 10:00 AM Rachael Oconnell MD [facility]

## 2021-08-16 RX ORDER — ALENDRONATE SODIUM 70 MG/1
TABLET ORAL
Qty: 12 TABLET | Refills: 0 | Status: SHIPPED | OUTPATIENT
Start: 2021-08-16 | End: 2021-11-04

## 2021-10-01 ENCOUNTER — TELEPHONE (OUTPATIENT)
Dept: INTERNAL MEDICINE | Facility: CLINIC | Age: 61
End: 2021-10-01

## 2021-10-01 NOTE — TELEPHONE ENCOUNTER
Caller: Leonie Becker    Relationship to patient: Self    Best call back number: [phone]    Patient is needing: WOULD LIKE TO SPEAK TO DR RUVALCABA TO DISCUSS [facility]. PATIENT IS VERY CONCERNED ABOUT CALCIUM AND INABILITY TO PROCESS DAIRY PRODUCTS.   PATIENT STATES SHE HAS BEEN ADVISED TO SEE A SPECIALIST, BUT ASKS IF April 2022 WOULD BE ACCEPTABLE FOR THAT VISIT.    PLEASE ADVISE

## 2021-10-04 DIAGNOSIS — M81.0 OSTEOPOROSIS, UNSPECIFIED OSTEOPOROSIS TYPE, UNSPECIFIED PATHOLOGICAL FRACTURE PRESENCE: ICD-10-CM

## 2021-10-04 DIAGNOSIS — Z91.89 HAS POORLY BALANCED DIET: Primary | ICD-10-CM

## 2021-10-04 NOTE — TELEPHONE ENCOUNTER
I d/w patient.  I think it would be a good idea for her to see endo for her osteoporosis.  Referral is placed.  FLO

## 2021-10-07 ENCOUNTER — TELEPHONE (OUTPATIENT)
Dept: INTERNAL MEDICINE | Facility: CLINIC | Age: 61
End: 2021-10-07

## 2021-10-07 NOTE — TELEPHONE ENCOUNTER
----- Message from Marlene Morejon MA sent at 10/7/2021 11:04 AM EDT -----  She needs office visit for this. Please call and schedule.  ----- Message -----  From: Tess Rosas  Sent: 10/7/2021  10:45 AM EDT  To: Marlene Morejon MA    Patient would like to add an urine analysis because she experiencing pain. She schedule for lab work on Monday 10/11/2021. Can you please add order.

## 2021-10-07 NOTE — TELEPHONE ENCOUNTER
Called patient to let her know that she needed to make an appointment with Dr. Oconnell in order to get u/a checked. Patient declined appointment.

## 2021-10-11 DIAGNOSIS — E03.9 ACQUIRED HYPOTHYROIDISM: ICD-10-CM

## 2021-10-11 DIAGNOSIS — E55.9 VITAMIN D DEFICIENCY: ICD-10-CM

## 2021-10-11 DIAGNOSIS — E78.5 HYPERLIPIDEMIA, UNSPECIFIED HYPERLIPIDEMIA TYPE: Primary | ICD-10-CM

## 2021-10-11 DIAGNOSIS — E63.9 POOR DIET: Primary | ICD-10-CM

## 2021-10-11 DIAGNOSIS — M81.0 OSTEOPOROSIS, UNSPECIFIED OSTEOPOROSIS TYPE, UNSPECIFIED PATHOLOGICAL FRACTURE PRESENCE: ICD-10-CM

## 2021-10-12 DIAGNOSIS — R39.9 UTI SYMPTOMS: Primary | ICD-10-CM

## 2021-10-13 LAB
MERCURY BLD-MCNC: 1.4 UG/L (ref 0–14.9)
VIT B12 SERPL-MCNC: 490 PG/ML (ref 211–946)

## 2021-10-14 LAB
APPEARANCE UR: CLEAR
BACTERIA #/AREA URNS HPF: NORMAL /HPF
BILIRUB UR QL STRIP: NEGATIVE
CASTS URNS QL MICRO: NORMAL /LPF
COLOR UR: YELLOW
EPI CELLS #/AREA URNS HPF: NORMAL /HPF (ref 0–10)
GLUCOSE UR QL: NEGATIVE
HGB UR QL STRIP: NEGATIVE
KETONES UR QL STRIP: NEGATIVE
LEUKOCYTE ESTERASE UR QL STRIP: NEGATIVE
MICRO URNS: NORMAL
MICRO URNS: NORMAL
NITRITE UR QL STRIP: NEGATIVE
PH UR STRIP: 6 [PH] (ref 5–7.5)
PROT UR QL STRIP: NEGATIVE
RBC #/AREA URNS HPF: NORMAL /HPF (ref 0–2)
SP GR UR: 1.02 (ref 1–1.03)
URINALYSIS REFLEX: NORMAL
UROBILINOGEN UR STRIP-MCNC: 0.2 MG/DL (ref 0.2–1)
WBC #/AREA URNS HPF: NORMAL /HPF (ref 0–5)

## 2021-10-22 NOTE — PROGRESS NOTES
Subjective   Patient ID: Leonie Becker is a 61 y.o. female is here today for follow-up.    She was last seen 6/26/21 for Neck pain, ulnar neuropathy of both upper extremities, and displacement of cervical intervertebral disc without myelopathy.    She was given Gabapentin 100 MG capsule; Take 100 mg p.o. nightly x2 weeks then twice daily  Dispense: 60 capsule; Refill: 3 and referred to Physical Therapy.    Today patient reports nothing is changed.    I been following her for some cervical disc disease particularly at C3-C4 which causes some left posterior shoulder and upper arm pain.  But she has a separate ulnar problem.  Is been about a year since she underwent an ulnar decompression by Dr. Waldron.  He feels there has actually been some improvement.  She can play the guitar and prepare food without as much difficulty.  Her strength is fine.  She was concerned about potential ototoxicity of the gabapentin that I gave her last time and so she did not try it but she is willing to give it another go and will give it a try to see if it helps with some of the residual symptoms.  She does have some left trapezius and posterior shoulder areas that seem to be benefited from the therapy that we started last time.  They did not do dry needling but wanted to and she wants to try it although it is probably going to have to come out of her own pocket.  We will ask the physical therapist to do that.  She says that she pays out-of-pocket for her massage therapy which seems to help but if she got a prescription from me she could use it for tax purposes for a deduction.  Since I think it benefits her all provide that although she knows her insurance will not pay for it she will still have to pay out-of-pocket.  I will see her in 6 months.        Neck Pain   The pain is present in the midline and left side. The quality of the pain is described as aching, burning, cramping, shooting and stabbing. The pain is at a severity of 3/10.  "The symptoms are aggravated by position. The pain is worse during the day. Pertinent negatives include no fever, numbness or trouble swallowing. She has tried neck support (patches) for the symptoms. The treatment provided mild relief.       The following portions of the patient's history were reviewed and updated as appropriate: allergies, current medications, past family history, past medical history, past social history, past surgical history and problem list.    Review of Systems   Constitutional: Negative for chills and fever.   HENT: Negative for congestion and trouble swallowing.    Musculoskeletal: Positive for neck pain.   Neurological: Negative for numbness.   All other systems reviewed and are negative.          Objective     Vitals:    10/25/21 1305   BP: 118/64   Pulse: 88   Temp: 97.3 °F (36.3 °C)   SpO2: 98%   Weight: 49.9 kg (110 lb)   Height: 160 cm (62.99\")     Body mass index is 19.49 kg/m².      Physical Exam  Constitutional:       Appearance: She is well-developed.   HENT:      Head: Normocephalic and atraumatic.   Eyes:      Extraocular Movements: EOM normal.      Conjunctiva/sclera: Conjunctivae normal.      Pupils: Pupils are equal, round, and reactive to light.   Neck:      Vascular: No carotid bruit.   Neurological:      Mental Status: She is oriented to person, place, and time.      Coordination: Finger-Nose-Finger Test and Heel to Shin Test normal.      Gait: Gait is intact.      Deep Tendon Reflexes:      Reflex Scores:       Tricep reflexes are 2+ on the right side and 2+ on the left side.       Bicep reflexes are 2+ on the right side and 2+ on the left side.       Brachioradialis reflexes are 2+ on the right side and 2+ on the left side.       Patellar reflexes are 2+ on the right side and 2+ on the left side.       Achilles reflexes are 2+ on the right side and 2+ on the left side.  Psychiatric:         Speech: Speech normal.       Neurologic Exam     Mental Status   Oriented to " person, place, and time.   Registration of memory: Good recent and remote memory.   Attention: normal. Concentration: normal.   Speech: speech is normal   Level of consciousness: alert  Knowledge: consistent with education.     Cranial Nerves     CN II   Visual fields full to confrontation.   Visual acuity: normal    CN III, IV, VI   Pupils are equal, round, and reactive to light.  Extraocular motions are normal.     CN V   Facial sensation intact.   Right corneal reflex: normal  Left corneal reflex: normal    CN VII   Facial expression full, symmetric.   Right facial weakness: none  Left facial weakness: none    CN VIII   Hearing: intact    CN IX, X   Palate: symmetric    CN XI   Right sternocleidomastoid strength: normal  Left sternocleidomastoid strength: normal    CN XII   Tongue: not atrophic  Tongue deviation: none    Motor Exam   Muscle bulk: normal  Right arm tone: normal  Left arm tone: normal  Right leg tone: normal  Left leg tone: normal    Strength   Strength 5/5 except as noted.     Sensory Exam   Light touch normal.     Gait, Coordination, and Reflexes     Gait  Gait: normal    Coordination   Finger to nose coordination: normal  Heel to shin coordination: normal    Reflexes   Right brachioradialis: 2+  Left brachioradialis: 2+  Right biceps: 2+  Left biceps: 2+  Right triceps: 2+  Left triceps: 2+  Right patellar: 2+  Left patellar: 2+  Right achilles: 2+  Left achilles: 2+  Right : 2+  Left : 2+          Assessment/Plan   Independent Review of Radiographic Studies:      I personally reviewed the images from the following studies.    I reviewed the cervical MRI done on 8/7/2020 which shows no evidence of any root compression at C7-T1.  There is a right-sided C6-C7 disc protrusion and on the left there is some foraminal stenosis with a disc protrusion at that level compressing the left C4 nerve root.  At C4-C5 and C5-C6 there is some disc bulging and very mild foraminal stenosis.  I do not think  the stenosis at C5-C6 is very significant.  The worst of it is seems to be at C3-C4 towards the left.  Otherwise agree with the report    Medical Decision Making:      We will try some dry needling of the left trapezius region.  She will retry the gabapentin when she feels ready.  She will be seen Dr. Waldron in the next 6 weeks.  I will see her again in 6 months.  Hopefully we can avoid surgery at the C3-C4 level.      Diagnoses and all orders for this visit:    1. Ulnar neuropathy of both upper extremities (Primary)    2. Displacement of cervical intervertebral disc without myelopathy  -     Ambulatory Referral to Physical Therapy Evaluate and treat    3. Neck pain  -     Ambulatory Referral to Physical Therapy Evaluate and treat      Return in about 6 months (around 4/25/2022) for Face-to-face.

## 2021-10-25 ENCOUNTER — OFFICE VISIT (OUTPATIENT)
Dept: NEUROSURGERY | Facility: CLINIC | Age: 61
End: 2021-10-25

## 2021-10-25 VITALS
WEIGHT: 110 LBS | HEIGHT: 63 IN | HEART RATE: 88 BPM | SYSTOLIC BLOOD PRESSURE: 118 MMHG | DIASTOLIC BLOOD PRESSURE: 64 MMHG | BODY MASS INDEX: 19.49 KG/M2 | TEMPERATURE: 97.3 F | OXYGEN SATURATION: 98 %

## 2021-10-25 DIAGNOSIS — G56.23 ULNAR NEUROPATHY OF BOTH UPPER EXTREMITIES: Primary | ICD-10-CM

## 2021-10-25 DIAGNOSIS — M54.2 NECK PAIN: ICD-10-CM

## 2021-10-25 DIAGNOSIS — M50.20 DISPLACEMENT OF CERVICAL INTERVERTEBRAL DISC WITHOUT MYELOPATHY: ICD-10-CM

## 2021-10-25 PROCEDURE — 99214 OFFICE O/P EST MOD 30 MIN: CPT | Performed by: NEUROLOGICAL SURGERY

## 2021-10-25 RX ORDER — TRAMADOL HYDROCHLORIDE 50 MG/1
1 TABLET ORAL 2 TIMES DAILY PRN
COMMUNITY
Start: 2021-05-05

## 2021-11-04 RX ORDER — ALENDRONATE SODIUM 70 MG/1
TABLET ORAL
Qty: 12 TABLET | Refills: 0 | Status: SHIPPED | OUTPATIENT
Start: 2021-11-04 | End: 2022-04-25

## 2021-12-01 ENCOUNTER — IMMUNIZATION (OUTPATIENT)
Dept: VACCINE CLINIC | Facility: HOSPITAL | Age: 61
End: 2021-12-01

## 2021-12-01 PROCEDURE — 0004A HC ADM SARSCOV2 30MCG/0.3ML BOOSTER: CPT | Performed by: INTERNAL MEDICINE

## 2021-12-01 PROCEDURE — 91300 HC SARSCOV02 VAC 30MCG/0.3ML IM: CPT | Performed by: INTERNAL MEDICINE

## 2022-01-31 DIAGNOSIS — E78.5 HYPERLIPIDEMIA, UNSPECIFIED HYPERLIPIDEMIA TYPE: Primary | ICD-10-CM

## 2022-01-31 DIAGNOSIS — E03.9 ACQUIRED HYPOTHYROIDISM: ICD-10-CM

## 2022-01-31 DIAGNOSIS — M81.0 OSTEOPOROSIS, UNSPECIFIED OSTEOPOROSIS TYPE, UNSPECIFIED PATHOLOGICAL FRACTURE PRESENCE: ICD-10-CM

## 2022-02-02 LAB
25(OH)D3+25(OH)D2 SERPL-MCNC: 35 NG/ML (ref 30–100)
ALBUMIN SERPL-MCNC: 4.3 G/DL (ref 3.8–4.8)
ALBUMIN/GLOB SERPL: 1.8 {RATIO} (ref 1.2–2.2)
ALP SERPL-CCNC: 84 IU/L (ref 44–121)
ALT SERPL-CCNC: 28 IU/L (ref 0–32)
APPEARANCE UR: ABNORMAL
AST SERPL-CCNC: 31 IU/L (ref 0–40)
BACTERIA #/AREA URNS HPF: NORMAL /[HPF]
BASOPHILS # BLD AUTO: 0.1 X10E3/UL (ref 0–0.2)
BASOPHILS NFR BLD AUTO: 2 %
BILIRUB SERPL-MCNC: <0.2 MG/DL (ref 0–1.2)
BILIRUB UR QL STRIP: NEGATIVE
BUN SERPL-MCNC: 24 MG/DL (ref 8–27)
BUN/CREAT SERPL: 21 (ref 12–28)
CALCIUM SERPL-MCNC: 9.6 MG/DL (ref 8.7–10.3)
CASTS URNS QL MICRO: NORMAL /LPF
CHLORIDE SERPL-SCNC: 104 MMOL/L (ref 96–106)
CHOLEST SERPL-MCNC: 245 MG/DL (ref 100–199)
CO2 SERPL-SCNC: 23 MMOL/L (ref 20–29)
COLOR UR: YELLOW
CREAT SERPL-MCNC: 1.13 MG/DL (ref 0.57–1)
EOSINOPHIL # BLD AUTO: 0.2 X10E3/UL (ref 0–0.4)
EOSINOPHIL NFR BLD AUTO: 4 %
EPI CELLS #/AREA URNS HPF: NORMAL /HPF (ref 0–10)
ERYTHROCYTE [DISTWIDTH] IN BLOOD BY AUTOMATED COUNT: 12.1 % (ref 11.7–15.4)
GLOBULIN SER CALC-MCNC: 2.4 G/DL (ref 1.5–4.5)
GLUCOSE SERPL-MCNC: 90 MG/DL (ref 65–99)
GLUCOSE UR QL STRIP: NEGATIVE
HCT VFR BLD AUTO: 41.1 % (ref 34–46.6)
HDLC SERPL-MCNC: 77 MG/DL
HGB BLD-MCNC: 13.2 G/DL (ref 11.1–15.9)
HGB UR QL STRIP: NEGATIVE
IMM GRANULOCYTES # BLD AUTO: 0 X10E3/UL (ref 0–0.1)
IMM GRANULOCYTES NFR BLD AUTO: 0 %
KETONES UR QL STRIP: NEGATIVE
LDLC SERPL CALC-MCNC: 149 MG/DL (ref 0–99)
LEUKOCYTE ESTERASE UR QL STRIP: NEGATIVE
LYMPHOCYTES # BLD AUTO: 1.1 X10E3/UL (ref 0.7–3.1)
LYMPHOCYTES NFR BLD AUTO: 21 %
MCH RBC QN AUTO: 28.8 PG (ref 26.6–33)
MCHC RBC AUTO-ENTMCNC: 32.1 G/DL (ref 31.5–35.7)
MCV RBC AUTO: 90 FL (ref 79–97)
MICRO URNS: ABNORMAL
MICRO URNS: ABNORMAL
MONOCYTES # BLD AUTO: 0.5 X10E3/UL (ref 0.1–0.9)
MONOCYTES NFR BLD AUTO: 10 %
NEUTROPHILS # BLD AUTO: 3.5 X10E3/UL (ref 1.4–7)
NEUTROPHILS NFR BLD AUTO: 63 %
NITRITE UR QL STRIP: NEGATIVE
PH UR STRIP: 5 [PH] (ref 5–7.5)
PLATELET # BLD AUTO: 258 X10E3/UL (ref 150–450)
POTASSIUM SERPL-SCNC: 4.2 MMOL/L (ref 3.5–5.2)
PROT SERPL-MCNC: 6.7 G/DL (ref 6–8.5)
PROT UR QL STRIP: NEGATIVE
RBC # BLD AUTO: 4.58 X10E6/UL (ref 3.77–5.28)
RBC #/AREA URNS HPF: NORMAL /HPF (ref 0–2)
SODIUM SERPL-SCNC: 142 MMOL/L (ref 134–144)
SP GR UR STRIP: 1.02 (ref 1–1.03)
T4 FREE SERPL-MCNC: 1.17 NG/DL (ref 0.82–1.77)
TRIGL SERPL-MCNC: 111 MG/DL (ref 0–149)
TSH SERPL DL<=0.005 MIU/L-ACNC: 7.62 UIU/ML (ref 0.45–4.5)
UROBILINOGEN UR STRIP-MCNC: 0.2 MG/DL (ref 0.2–1)
VLDLC SERPL CALC-MCNC: 19 MG/DL (ref 5–40)
WBC # BLD AUTO: 5.5 X10E3/UL (ref 3.4–10.8)
WBC #/AREA URNS HPF: NORMAL /HPF (ref 0–5)

## 2022-02-08 ENCOUNTER — TRANSCRIBE ORDERS (OUTPATIENT)
Dept: INTERNAL MEDICINE | Facility: CLINIC | Age: 62
End: 2022-02-08

## 2022-02-08 ENCOUNTER — OFFICE VISIT (OUTPATIENT)
Dept: INTERNAL MEDICINE | Facility: CLINIC | Age: 62
End: 2022-02-08

## 2022-02-08 VITALS
TEMPERATURE: 97.6 F | HEIGHT: 62 IN | BODY MASS INDEX: 19.88 KG/M2 | HEART RATE: 62 BPM | DIASTOLIC BLOOD PRESSURE: 70 MMHG | WEIGHT: 108 LBS | SYSTOLIC BLOOD PRESSURE: 120 MMHG | OXYGEN SATURATION: 96 %

## 2022-02-08 DIAGNOSIS — E03.9 ACQUIRED HYPOTHYROIDISM: ICD-10-CM

## 2022-02-08 DIAGNOSIS — Z13.6 ENCOUNTER FOR SCREENING FOR VASCULAR DISEASE: Primary | ICD-10-CM

## 2022-02-08 DIAGNOSIS — E78.5 HYPERLIPIDEMIA, UNSPECIFIED HYPERLIPIDEMIA TYPE: ICD-10-CM

## 2022-02-08 DIAGNOSIS — M81.0 OSTEOPOROSIS, UNSPECIFIED OSTEOPOROSIS TYPE, UNSPECIFIED PATHOLOGICAL FRACTURE PRESENCE: ICD-10-CM

## 2022-02-08 DIAGNOSIS — Z00.00 MEDICARE ANNUAL WELLNESS VISIT, SUBSEQUENT: Primary | ICD-10-CM

## 2022-02-08 PROCEDURE — G0439 PPPS, SUBSEQ VISIT: HCPCS | Performed by: INTERNAL MEDICINE

## 2022-02-08 PROCEDURE — 1159F MED LIST DOCD IN RCRD: CPT | Performed by: INTERNAL MEDICINE

## 2022-02-08 PROCEDURE — 1170F FXNL STATUS ASSESSED: CPT | Performed by: INTERNAL MEDICINE

## 2022-02-08 PROCEDURE — 1126F AMNT PAIN NOTED NONE PRSNT: CPT | Performed by: INTERNAL MEDICINE

## 2022-02-08 PROCEDURE — 99214 OFFICE O/P EST MOD 30 MIN: CPT | Performed by: INTERNAL MEDICINE

## 2022-02-08 RX ORDER — CLOBETASOL PROPIONATE 0.5 MG/G
OINTMENT TOPICAL
COMMUNITY

## 2022-02-08 RX ORDER — NORTRIPTYLINE HYDROCHLORIDE 10 MG/1
10 CAPSULE ORAL NIGHTLY
COMMUNITY

## 2022-02-08 NOTE — PROGRESS NOTES
The ABCs of the Annual Wellness Visit  Subsequent Medicare Wellness Visit    Chief Complaint   Patient presents with   • Annual Exam   • Hyperlipidemia   • Hypothyroidism      Subjective    History of Present Illness:  Leonie Becker is a 61 y.o. female who presents for a Subsequent Medicare Wellness Visit.    The following data was reviewed by: Rachael Oconnell MD on 02/08/2022:  Common labs    Common Labsle 2/1/22 2/1/22 2/1/22    1023 1023 1023   Glucose  90    BUN  24    Creatinine  1.13 (A)    eGFR Non  Am  52 (A)    eGFR African Am  60    Sodium  142    Potassium  4.2    Chloride  104    Calcium  9.6    Total Protein  6.7    Albumin  4.3    Total Bilirubin  <0.2    Alkaline Phosphatase  84    AST (SGOT)  31    ALT (SGPT)  28    WBC 5.5     Hemoglobin 13.2     Hematocrit 41.1     Platelets 258     Total Cholesterol   245 (A)   Triglycerides   111   HDL Cholesterol   77   LDL Cholesterol    149 (A)   (A) Abnormal value       Comments are available for some flowsheets but are not being displayed.             HLD.  Moderate elevation of cholesterol.  Hypothyroidism.  She is not on levothyroxine.  She is still subclinical range.   OP.  She is on calcium and D plus Fosamax.        The following portions of the patient's history were reviewed and   updated as appropriate: allergies, current medications, past family history, past medical history, past social history, past surgical history and problem list.    Compared to one year ago, the patient feels her physical   health is the same.    Compared to one year ago, the patient feels her mental   health is the same.    Recent Hospitalizations:  .No admissions.        Current Medical Providers:  Patient Care Team:  Rachael Oconnell MD as PCP - General (Internal Medicine)  Maci Jennings MD as Consulting Physician (Obstetrics and Gynecology)    Outpatient Medications Prior to Visit   Medication Sig Dispense Refill   • alendronate (FOSAMAX) 70 MG tablet TAKE 1  TABLET BY MOUTH 1 TIME WEEKLY AS DIRECTED 12 tablet 0   • baclofen (LIORESAL) 10 MG tablet Take by mouth 3 (three) times a day as needed. 1 to 2 tablets     • Cholecalciferol (VITAMIN D3) 400 UNITS capsule Take by mouth.     • Cod Liver Oil 10 MINIM capsule Take by mouth.     • diazepam (VALIUM) 5 MG tablet Take 1 tablet by mouth as needed.     • Estriol powder      • Hormone Cream Base cream      • Ivermectin powder      • lidocaine (LIDODERM) 5 % Apply 1 patch topically as needed.     • nortriptyline (PAMELOR) 10 MG capsule Take 10 mg by mouth Every Night.     • Omega-3 Fatty Acids (FISH OIL PO) Take by mouth.     • oxyCODONE (ROXICODONE) 5 MG immediate release tablet Take 1 to 2 tablets every 4 to 6 hours as needed for pain     • PREBIOTIC PRODUCT PO Take  by mouth.     • Probiotic Product (PROBIOTIC ADVANCED PO) Take  by mouth.     • Progesterone Micronized (PROGESTERONE, BULK,) powder Place on the skin.       • promethazine (PHENERGAN) 25 MG tablet   2   • traMADol (ULTRAM) 50 MG tablet Take 1 tablet by mouth 2 (Two) Times a Day As Needed.     • clobetasol (TEMOVATE) 0.05 % ointment clobetasol 0.05 % topical ointment   APPLY THIN LAYER THREE TIMES DAILY FOR 3 WEEKS THEN ONCE DAILY UNTIL SKIN LOOKS AND FEELS NORMAL THEN USE TWICE WEEKLY     • gabapentin (NEURONTIN) 100 MG capsule Take 100 mg p.o. nightly x2 weeks then twice daily 60 capsule 3   • traMADol (ULTRAM) 50 MG tablet Take by mouth.       No facility-administered medications prior to visit.       Opioid medication/s are on active medication list.  and I have evaluated her active treatment plan and pain score trends (see table).  Vitals:    02/08/22 1009   PainSc: 0-No pain     I have reviewed the chart for potential of high risk medication and harmful drug interactions in the elderly.            Aspirin is not on active medication list.  Aspirin use is not indicated based on review of current medical condition/s. Risk of harm outweighs potential  "benefits.  .    Patient Active Problem List   Diagnosis   • Tinnitus   • Atopic rhinitis   • Neck pain   • Chronic interstitial cystitis   • Degeneration of cervical intervertebral disc   • Hyperlipidemia   • Osteoporosis   • Pelvic floor dysfunction   • Acquired hypothyroidism   • Ulnar neuropathy of both upper extremities   • Carpal tunnel syndrome of right wrist   • Cervical spondylosis   • Cubital tunnel syndrome   • Hyperacusis, bilateral   • Osteoarthritis (arthritis due to wear and tear of joints)   • Degeneration of lumbar intervertebral disc   • Displacement of cervical intervertebral disc without myelopathy     Advance Care Planning  Advance Directive is not on file.  ACP discussion was held with the patient during this visit. Patient does not have an advance directive, information provided.    Review of Systems   Constitutional: Negative.    Respiratory: Negative.    Cardiovascular: Negative.    Musculoskeletal: Positive for back pain.        Objective    Vitals:    02/08/22 1009   BP: 120/70   Pulse: 62   Temp: 97.6 °F (36.4 °C)   SpO2: 96%   Weight: 49 kg (108 lb)   Height: 157.5 cm (62\")   PainSc: 0-No pain     BMI Readings from Last 1 Encounters:   02/08/22 19.75 kg/m²   BMI is within normal parameters. No follow-up required.    Does the patient have evidence of cognitive impairment? No    Physical Exam  Constitutional:       Appearance: She is well-developed.   HENT:      Head: Normocephalic and atraumatic.      Right Ear: Hearing, tympanic membrane and external ear normal.      Left Ear: Hearing, tympanic membrane and external ear normal.      Nose: Nose normal.   Neck:      Thyroid: No thyromegaly.   Cardiovascular:      Rate and Rhythm: Normal rate and regular rhythm.      Heart sounds: Normal heart sounds. No murmur heard.      Pulmonary:      Effort: Pulmonary effort is normal.      Breath sounds: Normal breath sounds.   Chest:   Breasts:      Right: No mass.      Left: No mass.       Abdominal: "      General: There is no distension.      Palpations: Abdomen is soft.      Tenderness: There is no abdominal tenderness.   Musculoskeletal:      Cervical back: Neck supple.   Lymphadenopathy:      Cervical: No cervical adenopathy.   Skin:     General: Skin is warm and dry.   Neurological:      Mental Status: She is alert and oriented to person, place, and time.   Psychiatric:         Speech: Speech normal.         Behavior: Behavior normal.         Thought Content: Thought content normal.         Judgment: Judgment normal.       Lab Results   Component Value Date    CHLPL 245 (H) 2022    TRIG 111 2022    HDL 77 2022     (H) 2022    VLDL 19 2022            HEALTH RISK ASSESSMENT    Smoking Status:  Social History     Tobacco Use   Smoking Status Former Smoker   • Years: 2.00   • Start date:    • Quit date:    • Years since quittin.1   Smokeless Tobacco Never Used     Alcohol Consumption:  Social History     Substance and Sexual Activity   Alcohol Use No     Fall Risk Screen:    STEADI Fall Risk Assessment has not been completed.    Depression Screening:  PHQ-2/PHQ-9 Depression Screening 2022   Little interest or pleasure in doing things -   Feeling down, depressed, or hopeless 0   Trouble falling or staying asleep, or sleeping too much -   Feeling tired or having little energy -   Poor appetite or overeating -   Feeling bad about yourself - or that you are a failure or have let yourself or your family down -   Trouble concentrating on things, such as reading the newspaper or watching television -   Moving or speaking so slowly that other people could have noticed. Or the opposite - being so fidgety or restless that you have been moving around a lot more than usual -   Thoughts that you would be better off dead, or of hurting yourself in some way -   Total Score 0   If you checked off any problems, how difficult have these problems made it for you to do your  work, take care of things at home, or get along with other people? -       Health Habits and Functional and Cognitive Screening:  Functional & Cognitive Status 2/8/2022   Do you have difficulty preparing food and eating? No   Do you have difficulty bathing yourself, getting dressed or grooming yourself? No   Do you have difficulty using the toilet? No   Do you have difficulty moving around from place to place? No   Do you have trouble with steps or getting out of a bed or a chair? No   Current Diet Well Balanced Diet   Dental Exam Up to date   Eye Exam Up to date   Exercise (times per week) 0 times per week   Current Exercises Include No Regular Exercise   Current Exercise Activities Include -   Do you need help using the phone?  No   Are you deaf or do you have serious difficulty hearing?  No   Do you need help with transportation? No   Do you need help shopping? No   Do you need help preparing meals?  No   Do you need help with housework?  No   Do you need help with laundry? No   Do you need help taking your medications? No   Do you need help managing money? No   Do you ever drive or ride in a car without wearing a seat belt? No   Have you felt unusual stress, anger or loneliness in the last month? No   Who do you live with? -   If you need help, do you have trouble finding someone available to you? No   Have you been bothered in the last four weeks by sexual problems? No   Do you have difficulty concentrating, remembering or making decisions? No       Age-appropriate Screening Schedule:  Refer to the list below for future screening recommendations based on patient's age, sex and/or medical conditions. Orders for these recommended tests are listed in the plan section. The patient has been provided with a written plan.    Health Maintenance   Topic Date Due   • LIPID PANEL  02/01/2023   • MAMMOGRAM  02/05/2023   • DXA SCAN  05/19/2023   • PAP SMEAR  03/01/2024   • TDAP/TD VACCINES (4 - Td or Tdap) 04/24/2027   •  INFLUENZA VACCINE  Discontinued              Assessment/Plan   CMS Preventative Services Quick Reference  Risk Factors Identified During Encounter  calcium and diet  The above risks/problems have been discussed with the patient.  Follow up actions/plans if indicated are seen below in the Assessment/Plan Section.  Pertinent information has been shared with the patient in the After Visit Summary.    Diagnoses and all orders for this visit:    1. Medicare annual wellness visit, subsequent (Primary)    2. Hyperlipidemia, unspecified hyperlipidemia type    3. Acquired hypothyroidism    4. Osteoporosis, unspecified osteoporosis type, unspecified pathological fracture presence      HLD.  Continue healthy diet.  Hypothyroidism.  Continue to monitor.  OP.  I recommend to get 1200 mg of calcium and 1000 IUs of vitamin D through diet and supplements and to participate in a weight based exercise to prevent loss of bone mineral density. Bone mineral will be monitored every two years.  Continue Fosamax.      Follow Up:   Return in about 1 year (around 2/8/2023) for Medicare Wellness.     An After Visit Summary and PPPS were made available to the patient.

## 2022-02-08 NOTE — PATIENT INSTRUCTIONS
Medicare Wellness  Personal Prevention Plan of Service     Date of Office Visit:  2022  Encounter Provider:  Rachael Oconnell MD  Place of Service:  [facility]  Patient Name: Leonie Becker  :  1960    As part of the Medicare Wellness portion of your visit today, we are providing you with this personalized preventive plan of services (PPPS). This plan is based upon recommendations of the [place] Preventive Services Task Force (USPSTF) and the Advisory Committee on Immunization Practices (ACIP).    This lists the preventive care services that should be considered, and provides dates of when you are due. Items listed as completed are up-to-date and do not require any further intervention.    Health Maintenance   Topic Date Due   • ANNUAL WELLNESS VISIT  2022   • LIPID PANEL  2023   • MAMMOGRAM  2023   • DXA SCAN  2023   • PAP SMEAR  2024   • COLORECTAL CANCER SCREENING  09/10/2024   • TDAP/TD VACCINES (4 - Td or Tdap) 2027   • HEPATITIS C SCREENING  Completed   • COVID-19 Vaccine  Completed   • Pneumococcal Vaccine 0-64  Aged Out   • INFLUENZA VACCINE  Discontinued       No orders of the defined types were placed in this encounter.      No follow-ups on file.        Advance Directive    Advance directives are legal documents that let you make choices ahead of time about your health care and medical treatment in case you become unable to communicate for yourself. Advance directives are a way for you to make known your wishes to family, friends, and health care providers. This can let others know about your end-of-life care if you become unable to communicate.  Discussing and writing advance directives should happen over time rather than all at once. Advance directives can be changed depending on your situation and what you want, even after you have signed the advance directives.  There are different types of advance directives,  such as:  · Medical power of .  · Living will.  · Do not resuscitate (DNR) or do not attempt resuscitation (DNAR) order.  Health care proxy and medical power of   A health care proxy is also called a health care agent. This is a person who is appointed to make medical decisions for you in cases where you are unable to make the decisions yourself. Generally, people choose someone they know well and trust to represent their preferences. Make sure to ask this person for an agreement to act as your proxy. A proxy may have to exercise judgment in the event of a medical decision for which your wishes are not known.  A medical power of  is a legal document that names your health care proxy. Depending on the laws in your state, after the document is written, it may also need to be:  · Signed.  · Notarized.  · Dated.  · Copied.  · Witnessed.  · Incorporated into your medical record.  You may also want to appoint someone to manage your money in a situation in which you are unable to do so. This is called a durable power of  for finances. It is a separate legal document from the durable power of  for health care. You may choose the same person or someone different from your health care proxy to act as your agent in money matters.  If you do not appoint a proxy, or if there is a concern that the proxy is not acting in your best interests, a court may appoint a guardian to act on your behalf.  Living will  A living will is a set of instructions that state your wishes about medical care when you cannot express them yourself. Health care providers should keep a copy of your living will in your medical record. You may want to give a copy to family members or friends. To alert caregivers in case of an emergency, you can place a card in your wallet to let them know that you have a living will and where they can find it. A living will is used if you become:  · Terminally  ill.  · Disabled.  · Unable to communicate or make decisions.  Items to consider in your living will include:  · To use or not to use life-support equipment, such as dialysis machines and breathing machines (ventilators).  · A DNR or DNAR order. This tells health care providers not to use cardiopulmonary resuscitation (CPR) if breathing or heartbeat stops.  · To use or not to use tube feeding.  · To be given or not to be given food and fluids.  · Comfort (palliative) care when the goal becomes comfort rather than a cure.  · Donation of organs and tissues.  A living will does not give instructions for distributing your money and property if you should pass away.  DNR or DNAR  A DNR or DNAR order is a request not to have CPR in the event that your heart stops beating or you stop breathing. If a DNR or DNAR order has not been made and shared, a health care provider will try to help any patient whose heart has stopped or who has stopped breathing. If you plan to have surgery, talk with your health care provider about how your DNR or DNAR order will be followed if problems occur.  What if I do not have an advance directive?  If you do not have an advance directive, some states assign family decision makers to act on your behalf based on how closely you are related to them. Each state has its own laws about advance directives. You may want to check with your health care provider, , or state representative about the laws in your state.  Summary  · Advance directives are the legal documents that allow you to make choices ahead of time about your health care and medical treatment in case you become unable to tell others about your care.  · The process of discussing and writing advance directives should happen over time. You can change the advance directives, even after you have signed them.  · Advance directives include DNR or DNAR orders, living wyatt, and designating an agent as your medical power of  .  This information is not intended to replace advice given to you by your health care provider. Make sure you discuss any questions you have with your health care provider.  Document Revised: 01/28/2021 Document Reviewed: 07/16/2020  [employer] Patient Education © 2021 [employer]

## 2022-02-09 ENCOUNTER — APPOINTMENT (OUTPATIENT)
Dept: WOMENS IMAGING | Facility: HOSPITAL | Age: 62
End: 2022-02-09

## 2022-02-09 PROCEDURE — 77063 BREAST TOMOSYNTHESIS BI: CPT | Performed by: RADIOLOGY

## 2022-02-09 PROCEDURE — 77067 SCR MAMMO BI INCL CAD: CPT | Performed by: RADIOLOGY

## 2022-02-14 ENCOUNTER — HOSPITAL ENCOUNTER (OUTPATIENT)
Dept: CARDIOLOGY | Facility: HOSPITAL | Age: 62
Discharge: HOME | End: 2022-02-14
Admitting: INTERNAL MEDICINE

## 2022-02-14 VITALS
BODY MASS INDEX: 18.96 KG/M2 | HEIGHT: 63 IN | HEART RATE: 59 BPM | WEIGHT: 107 LBS | SYSTOLIC BLOOD PRESSURE: 112 MMHG | DIASTOLIC BLOOD PRESSURE: 68 MMHG

## 2022-02-14 DIAGNOSIS — Z13.6 ENCOUNTER FOR SCREENING FOR VASCULAR DISEASE: ICD-10-CM

## 2022-02-14 LAB
BH CV ECHO MEAS - DIST AO DIAM: 1.48 CM
BH CV VAS BP LEFT ARM: NORMAL MMHG
BH CV VAS BP RIGHT ARM: NORMAL MMHG
BH CV XLRA MEAS - MID AO DIAM: 1.6 CM
BH CV XLRA MEAS - PAD LEFT ABI DP: 1.2
BH CV XLRA MEAS - PAD LEFT ABI PT: 1.18
BH CV XLRA MEAS - PAD LEFT ARM: 110 MMHG
BH CV XLRA MEAS - PAD LEFT LEG DP: 134 MMHG
BH CV XLRA MEAS - PAD LEFT LEG PT: 132 MMHG
BH CV XLRA MEAS - PAD RIGHT ABI DP: 1.24
BH CV XLRA MEAS - PAD RIGHT ABI PT: 1.23
BH CV XLRA MEAS - PAD RIGHT ARM: 112 MMHG
BH CV XLRA MEAS - PAD RIGHT LEG DP: 139 MMHG
BH CV XLRA MEAS - PAD RIGHT LEG PT: 138 MMHG
BH CV XLRA MEAS - PROX AO DIAM: 1.73 CM
BH CV XLRA MEAS LEFT ICA/CCA RATIO: 1.38
BH CV XLRA MEAS LEFT MID CCA PSV: NORMAL CM/SEC
BH CV XLRA MEAS LEFT MID ICA PSV: NORMAL CM/SEC
BH CV XLRA MEAS LEFT PROX ECA PSV: NORMAL CM/SEC
BH CV XLRA MEAS RIGHT ICA/CCA RATIO: 1.73
BH CV XLRA MEAS RIGHT MID CCA PSV: NORMAL CM/SEC
BH CV XLRA MEAS RIGHT MID ICA PSV: NORMAL CM/SEC
BH CV XLRA MEAS RIGHT PROX ECA PSV: NORMAL CM/SEC

## 2022-02-14 PROCEDURE — 93799 UNLISTED CV SVC/PROCEDURE: CPT

## 2022-02-15 ENCOUNTER — TREATMENT (OUTPATIENT)
Dept: PHYSICAL THERAPY | Facility: CLINIC | Age: 62
End: 2022-02-15

## 2022-02-15 DIAGNOSIS — M79.602 PAIN OF LEFT UPPER EXTREMITY: ICD-10-CM

## 2022-02-15 DIAGNOSIS — G89.29 CHRONIC LEFT SHOULDER PAIN: ICD-10-CM

## 2022-02-15 DIAGNOSIS — R68.89 DECREASED FUNCTIONAL ACTIVITY TOLERANCE: ICD-10-CM

## 2022-02-15 DIAGNOSIS — M25.512 CHRONIC LEFT SHOULDER PAIN: ICD-10-CM

## 2022-02-15 DIAGNOSIS — M54.2 CERVICAL PAIN: Primary | ICD-10-CM

## 2022-02-15 PROCEDURE — 97162 PT EVAL MOD COMPLEX 30 MIN: CPT | Performed by: PHYSICAL THERAPIST

## 2022-02-15 PROCEDURE — 97110 THERAPEUTIC EXERCISES: CPT | Performed by: PHYSICAL THERAPIST

## 2022-02-15 NOTE — PROGRESS NOTES
Physical Therapy Initial Evaluation and Plan of Care      Patient: Leonie Becker   : 1960  Diagnosis/ICD-10 Code:  Cervical pain [M54.2]  Referring practitioner: Wang De La Vega MD  Date of Initial Visit: 2/15/2022  Today's Date: 2/15/2022  Patient seen for 1 sessions           Subjective Evaluation    History of Present Illness  Date of onset: 2/15/2020  Mechanism of injury: Leonie first started having neck issues in . She was having severe headaches. She was diagnosed with stenosis. She has been playing [employer] since age 10, but increased her playing time in  when she retired as an . She was typing up to 15 hours a day when working ([employer]). She had been working on a particularly hard piece of music and started having a lot of arm pain and tingling into the L hand. 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 She really hasn't gotten as much relief as she thought she would. She still has pain around the neck, L shoulder pain. Pain into the L hand, ulnar nerve distribution, not much of the tingling.  She has had epidural injections. Has stopped with the steroids at the recommendation of MD for her cystitis    PMHx: interstitial cystitis, R knee scope, osteoporosis, tinnitus/sound sensitivity          Patient Occupation: retired , guitarist Pain  Current pain ratin  At best pain rating: 3  At worst pain ratin  Location: neck, shoulder, L arm  Quality: radiating, dull ache and burning  Relieving factors: medications and heat (massage)  Aggravating factors: keyboarding, lifting and repetitive movement (playing guitar, pushing around the area, fine motor use)  Progression: no change    Social Support  Lives in: multiple-level home  Lives with: lives with her sibling.    Hand dominance: right    Diagnostic Tests  MRI studies: abnormal    Treatments  Previous treatment: injection treatment, massage, medication and physical  "therapy  Patient Goals  Patient goals for therapy: decreased pain, return to sport/leisure activities and increased strength  Patient goal: playing [employer].           Objective          Static Posture     Comments  Decreased thoracic kyphosis, mild winging of the scapula, forward head    Palpation   Left   Hypertonic in the levator scapulae, rhomboids and upper trapezius.   Tenderness of the levator scapulae, rhomboids and upper trapezius.   Trigger point to levator scapulae and rhomboids.     Right   Hypertonic in the levator scapulae and upper trapezius. Tenderness of the upper trapezius.     Additional Palpation Details  L serratus posterior superior    Active Range of Motion     Right Shoulder   Normal active range of motion    Additional Active Range of Motion Details  Cervical AROM:  Flexion =70% pulling/tightness in post neck muscles  Extension =10% pain at base of neck  L rotation=50%, B stiffness  R rotation=20% pain B (feels stuck)  L with near normal AROM of shoulder pain around the medial scapular area    Strength/Myotome Testing     Left Shoulder     Planes of Motion   Flexion: 4- (pain)   Abduction: 4   External rotation at 0°: 4-   Internal rotation at 0°: 4     Right Shoulder     Planes of Motion   Flexion: 4+   Abduction: 4+   External rotation at 0°: 4+   Internal rotation at 0°: 4+     Left Elbow   Flexion: 4  Extension: 4    Right Elbow   Flexion: 4+  Extension: 4+    Tests     Additional Tests Details  Pt has central pain with Spurlings, but no radicular symptoms        Exercise:  -tried Angry Cat, but did not feel much of a stretch - also pt had concerns about OA in knees in quadruped  -use of tennis ball or obtaining a Theracane for home to help with light massage and trigger point release  -shoulder rolls x10  -scap rets x10 (manual contacts at inferior angle of the scapula for cueing)  -seated rhomboid stretch, \"hug a barrel\", 3x20 sec    Functional Outcome Score:   Neck Disability " Index=34%        Assessment & Plan     Assessment  Impairments: abnormal muscle tone, abnormal or restricted ROM, activity intolerance, impaired physical strength, lacks appropriate home exercise program and pain with function  Functional Limitations: carrying objects, lifting, sleeping, uncomfortable because of pain, moving in bed, sitting, reaching behind back and unable to perform repetitive tasks  Assessment details: Leonie Becker is a 61 y.o. year-old female referred to physical therapy for neck and L shoulder pain. She presents with a evolving clinical presentation.  She has comorbidities of spinal stenosis, L ulnar nerve release surgery, and no known personal factors that may affect her progress in the plan of care. Leonie has decreased cervical AROM with pain in the neck and L shoulder. She has decreased L shoulder strength and trigger points in the L UT, levator, rhomboids, and serratus posterior superior in the shoulder and in the FDL in the L forearm.  Signs and symptoms are consistent with physical therapy diagnosis of cervical and shoulder pain. Pt would benefit from therapy to help improve her ability to type, lift, and return to playing the guitar.    Prognosis: good    Goals  Plan Goals: ST wks  1. Patient will be independent with education for symptom management, joint protection and strategies to minimize stress on affected tissues  2. Pt to improve pain to no more than 6/10 with ADLs and playing guitar for 20 minutes    LT wks  1. Pt to improve pain to no more than 2/10 with ADLs and with playing guitar for 40 minutes  2. Pt to improve NDI score from 34% to 20% for overall functional improvement  3. Pt to improve L shoulder strength to 4+/5 for greater ease with lifting and playing guitar  4. Pt to be independent with HEP for symptom management and strengthening      Plan  Therapy options: will be seen for skilled therapy services  Planned modality interventions: high voltage pulsed  current (pain management), TENS, traction, ultrasound, cryotherapy, dry needling and thermotherapy (hydrocollator packs)  Planned therapy interventions: abdominal trunk stabilization, ADL retraining, body mechanics training, flexibility, functional ROM exercises, home exercise program, IADL retraining, joint mobilization, manual therapy, neuromuscular re-education, postural training, soft tissue mobilization, spinal/joint mobilization, strengthening, stretching and therapeutic activities  Frequency: 1x week  Duration in weeks: 12  Treatment plan discussed with: patient        Timed:  Manual Therapy:         mins  71204;  Therapeutic Exercise:    10     mins  52842;     Neuromuscular Miladis:        mins  61350;    Therapeutic Activity:          mins  79332;     Gait Training:           mins  51628;     Ultrasound:          mins  83031;    Iontophoresis         mins 61509  Dry Needling        mins 13891/ 20561 (Self-pay)      Untimed:  Electrical Stimulation:         mins  59452 ( );  Traction:       mins  54030;   Low Eval          Mins  99278  Mod Eval     35     Mins  12281  High Eval                            Mins  00435    Timed Treatment:   10   mins   Total Treatment:     45   mins    PT SIGNATURE: Griselda Lopez PT     License Number: [id number]    Electronically signed by Griselda Lopez PT, 02/15/22, 12:45 PM EST    DATE TREATMENT INITIATED: 2/15/2022    Initial Certification  Certification Period: 5/16/2022  I certify that the therapy services are furnished while this patient is under my care.  The services outlined above are required by this patient, and will be reviewed every 90 days.     PHYSICIAN: Wang De La Vega MD   NPI: [id number]                                         DATE:     Please sign and return via fax to [phone] Thank you, [facility] Physical Therapy.

## 2022-02-22 ENCOUNTER — TREATMENT (OUTPATIENT)
Dept: PHYSICAL THERAPY | Facility: CLINIC | Age: 62
End: 2022-02-22

## 2022-02-22 DIAGNOSIS — G89.29 CHRONIC LEFT SHOULDER PAIN: ICD-10-CM

## 2022-02-22 DIAGNOSIS — R68.89 DECREASED FUNCTIONAL ACTIVITY TOLERANCE: ICD-10-CM

## 2022-02-22 DIAGNOSIS — M54.2 CERVICAL PAIN: Primary | ICD-10-CM

## 2022-02-22 DIAGNOSIS — M79.602 PAIN OF LEFT UPPER EXTREMITY: ICD-10-CM

## 2022-02-22 DIAGNOSIS — M25.512 CHRONIC LEFT SHOULDER PAIN: ICD-10-CM

## 2022-02-22 PROCEDURE — 97140 MANUAL THERAPY 1/> REGIONS: CPT | Performed by: PHYSICAL THERAPIST

## 2022-02-22 PROCEDURE — 20561 NDL INSJ W/O NJX 3+ MUSC: CPT | Performed by: PHYSICAL THERAPIST

## 2022-02-22 NOTE — PROGRESS NOTES
Physical Therapy Daily Progress Note    Patient: Leonie Becker   : 1960  Diagnosis/ICD-10 Code:  Cervical pain [M54.2]  Referring practitioner: Wang De La Vega MD  Date of Initial Visit: Type: THERAPY  Noted: 2/15/2022  Today's Date: 2022  Patient seen for 2 sessions           Subjective pain is a little better    Objective     Exercises:  -rhomboid stretch (review with tactile and verbal cues for form) 3x20 sec  -shoulder rolls x5  -shoulder/scap ret x10    Manual:  Dry needling, using threading and direct techniques, obtaining written and verbal consent to treat after discussing benefits and risks.   Patient position during treatment: prone. Muscles treated: L mid throracic PVMs, rhomboids, posterior superior serratus. Response: LTRs.Clean needle technique observed at all times, precautions for lung fields, neurovascular structures observed. Manual palpation and assessment performed before, during, and after session.    STM to B thoracic PVMs, L rhomboids and posterior superior serratus, B laminar release, ischemic pressure for trigger point release    Assessment/Plan  Leonie has been doing a little better this week with trying to watch her posture and stretching. She did need some review of her HEP. She consented to dry needling after reviewing the benefits and risks. She did have good release of tissues in the mid back with needling and manual therapy. Pt was advised to increase hydration, ice if painful, and can use heat or ice after today if still has soreness.          Timed:    Manual Therapy:    23     mins  15245;  Therapeutic Exercise:    5     mins  93497;     Neuromuscular Miladis:        mins  26929;    Therapeutic Activity:          mins  19837;     Gait Training:           mins  50547;     Ultrasound:          mins  65291;    Electrical Stimulation:         mins  29428 ( );  Iontophoresis         mins 09416;  Aquatic Therapy         mins 40237;  Dry Needling              15      mins 20560/ 20561 (Self-pay)    Untimed:  Electrical Stimulation:         mins  37581 ( );  Traction:         mins  79857;     Timed Treatment:   43   mins   Total Treatment:     43   mins    Griselda Lopez, PT  Physical Therapist    [place] License:[id number]

## 2022-02-28 ENCOUNTER — TELEPHONE (OUTPATIENT)
Dept: PHYSICAL THERAPY | Facility: CLINIC | Age: 62
End: 2022-02-28

## 2022-02-28 NOTE — TELEPHONE ENCOUNTER
Returned patient's call about her pain following dry needling session on 2/23. She now has a constant pain around her scapular area over the last several days that is not letting up. She has had to take pain meds (unable to take Tylenol or ibuprofen). Through our discussion, I felt that the trigger point area was still inflamed and irritated. Advised to lightly stretch, use of tennis ball for self massage, heat usage prior to massage, and ice afterwards. I asked her to contact me if this doesn't help with her pain. She is having a massage on Wednesday

## 2022-03-01 ENCOUNTER — TELEPHONE (OUTPATIENT)
Dept: PHYSICAL THERAPY | Facility: OTHER | Age: 62
End: 2022-03-01

## 2022-03-01 NOTE — TELEPHONE ENCOUNTER
PATIENT DID NOT FEEL BETTER AFTER LAST TREATMENT WANTS NILSA TO CALL/ E-MAIL HER THE INFO THEY WENT OVER IF POSSIBLE

## 2022-03-03 ENCOUNTER — TREATMENT (OUTPATIENT)
Dept: PHYSICAL THERAPY | Facility: CLINIC | Age: 62
End: 2022-03-03

## 2022-03-03 DIAGNOSIS — G89.29 CHRONIC LEFT SHOULDER PAIN: ICD-10-CM

## 2022-03-03 DIAGNOSIS — R68.89 DECREASED FUNCTIONAL ACTIVITY TOLERANCE: ICD-10-CM

## 2022-03-03 DIAGNOSIS — M25.512 CHRONIC LEFT SHOULDER PAIN: ICD-10-CM

## 2022-03-03 DIAGNOSIS — M79.602 PAIN OF LEFT UPPER EXTREMITY: ICD-10-CM

## 2022-03-03 DIAGNOSIS — M54.2 CERVICAL PAIN: Primary | ICD-10-CM

## 2022-03-03 PROCEDURE — 97035 APP MDLTY 1+ULTRASOUND EA 15: CPT | Performed by: PHYSICAL THERAPIST

## 2022-03-03 PROCEDURE — 97530 THERAPEUTIC ACTIVITIES: CPT | Performed by: PHYSICAL THERAPIST

## 2022-03-03 PROCEDURE — 97140 MANUAL THERAPY 1/> REGIONS: CPT | Performed by: PHYSICAL THERAPIST

## 2022-03-03 NOTE — PROGRESS NOTES
Physical Therapy Daily Progress Note    Patient: Leonie Becker   : 1960  Diagnosis/ICD-10 Code:  Cervical pain [M54.2]  Referring practitioner: Wang De La Vega MD  Date of Initial Visit: Type: THERAPY  Noted: 2/15/2022  Today's Date: 3/3/2022  Patient seen for 3 sessions           Subjective I am feeling better today. Had a massage yesterday and she did some very light cupping. She said that spot felt smaller than it was before    Objective   See Exercise, Manual, and Modality Logs for complete treatment.     Manual:  Pt in R SL, mild to medium STM of the R periscapular muscles and mid thoracic PVMs. Ischemic pressure for trigger points in her erector spinae and rhomboids      Assessment/Plan  Pt had dry needling on . She was sore after the procedure, but then by 3 days later, she was in more pain. She reported that she tried the rhomboid stretch in the doorway and thinks that she was pulling too hard. We had talked on Monday and I gave her some things to try, which did help her a little bit and she was less sore Tuesday. More pain on Wednesday and had her massage which did help greatly. She continues with tightness of the midthoracic PMVs, rhomboids, midtrap, and serratus posterior superior, but the trigger point areas were reduced. Discussed going very mild with her stretching, drinking plenty of water, icing this evening for pain, and tomorrow can alternate heat and ice.          Timed:    Manual Therapy:    20     mins  01445;  Therapeutic Exercise:         mins  89319;     Neuromuscular Miladis:        mins  97431;    Therapeutic Activity:     10     mins  02635;     Gait Training:           mins  62206;     Ultrasound:     10     mins  99584;    Electrical Stimulation:         mins  18354 ( );  Iontophoresis         mins 18321;  Aquatic Therapy         mins 92209;  Dry Needling                   mins /  (Self-pay)    Untimed:  Electrical Stimulation:         mins  06011 (  );  Traction:         mins  42935;     Timed Treatment:   40   mins   Total Treatment:     40   mins    Griselda Lopez PT, CDNT  Physical Therapist    [place] License:[id number]

## 2022-03-08 ENCOUNTER — TREATMENT (OUTPATIENT)
Dept: PHYSICAL THERAPY | Facility: CLINIC | Age: 62
End: 2022-03-08

## 2022-03-08 DIAGNOSIS — R68.89 DECREASED FUNCTIONAL ACTIVITY TOLERANCE: ICD-10-CM

## 2022-03-08 DIAGNOSIS — M54.2 CERVICAL PAIN: Primary | ICD-10-CM

## 2022-03-08 DIAGNOSIS — M79.602 PAIN OF LEFT UPPER EXTREMITY: ICD-10-CM

## 2022-03-08 DIAGNOSIS — M25.512 CHRONIC LEFT SHOULDER PAIN: ICD-10-CM

## 2022-03-08 DIAGNOSIS — G89.29 CHRONIC LEFT SHOULDER PAIN: ICD-10-CM

## 2022-03-08 PROCEDURE — 97140 MANUAL THERAPY 1/> REGIONS: CPT | Performed by: PHYSICAL THERAPIST

## 2022-03-08 PROCEDURE — 97035 APP MDLTY 1+ULTRASOUND EA 15: CPT | Performed by: PHYSICAL THERAPIST

## 2022-03-08 NOTE — PROGRESS NOTES
Physical Therapy Daily Progress Note    Patient: Leonie Becker   : 1960  Diagnosis/ICD-10 Code:  Cervical pain [M54.2]  Referring practitioner: Wang De La Vega MD  Date of Initial Visit: Type: THERAPY  Noted: 2/15/2022  Today's Date: 3/8/2022  Patient seen for 4 sessions           Subjective I felt better after the last session. But tried to start with some shoulder rolls, and it flared it up. Pain 3-4/10 today    Objective   See Exercise, Manual, and Modality Logs for complete treatment.     Manual:  Pt in R SL, mild to medium STM of the R periscapular muscles and mid thoracic PVMs. Ischemic pressure for trigger points in her erector spinae and rhomboids, strumming of L UT and levator muscles    Assessment/Plan  Pt with reduced pain levels since our last session. She has not been feeling the pain into her hand. She did try starting back into some exercises (shoulder rolls), but it started to aggravate her shoulder. Discussed posture and the subtle movement of her scapula as to not overdo.          Timed:    Manual Therapy:    30     mins  29716;  Therapeutic Exercise:         mins  86343;     Neuromuscular Miladis:        mins  59195;    Therapeutic Activity:          mins  75538;     Gait Training:           mins  76895;     Ultrasound:     10     mins  61785;    Electrical Stimulation:         mins  28318 ( );  Iontophoresis         mins 98423;  Aquatic Therapy         mins 63036;  Dry Needling                   mins 49660/  (Self-pay)    Untimed:  Electrical Stimulation:         mins  68951 ( );  Traction:         mins  04202;     Timed Treatment:   40   mins   Total Treatment:     40   mins    Griselda Lopez PT, CDNT  Physical Therapist    [place] License:[id number]

## 2022-03-15 ENCOUNTER — TELEPHONE (OUTPATIENT)
Dept: NEUROSURGERY | Facility: CLINIC | Age: 62
End: 2022-03-15

## 2022-03-15 NOTE — TELEPHONE ENCOUNTER
Pt called: she is having (L) arm pain-states it radiates from neck/shoulder down the (L) arm. She is having some tingling in her fingers. She also stated having some pain in the thoracic area. She states she went to the hand surgeon and states it's could be cervical issue. She would like to know what Dr. ZAYAS's opinion would be.     She also states she went to PT and had a bad reaction to the dry needling. She states it was very painful.     Please advise!     Last seen: 10/25/22   Office Visit with Wang De La Vega MD (10/25/2021)      Next appt: 4/25/22 w/Dr. ZAYAS      Pt contact: [phone]  Best time to call: anytime

## 2022-03-22 ENCOUNTER — TREATMENT (OUTPATIENT)
Dept: PHYSICAL THERAPY | Facility: CLINIC | Age: 62
End: 2022-03-22

## 2022-03-22 DIAGNOSIS — M25.512 CHRONIC LEFT SHOULDER PAIN: ICD-10-CM

## 2022-03-22 DIAGNOSIS — G89.29 CHRONIC LEFT SHOULDER PAIN: ICD-10-CM

## 2022-03-22 DIAGNOSIS — R68.89 DECREASED FUNCTIONAL ACTIVITY TOLERANCE: ICD-10-CM

## 2022-03-22 DIAGNOSIS — M54.2 CERVICAL PAIN: Primary | ICD-10-CM

## 2022-03-22 DIAGNOSIS — M79.602 PAIN OF LEFT UPPER EXTREMITY: ICD-10-CM

## 2022-03-22 PROCEDURE — 97530 THERAPEUTIC ACTIVITIES: CPT | Performed by: PHYSICAL THERAPIST

## 2022-03-22 PROCEDURE — 97140 MANUAL THERAPY 1/> REGIONS: CPT | Performed by: PHYSICAL THERAPIST

## 2022-03-22 PROCEDURE — 97035 APP MDLTY 1+ULTRASOUND EA 15: CPT | Performed by: PHYSICAL THERAPIST

## 2022-03-22 NOTE — PROGRESS NOTES
30-Day / 10-Visit Progress Note         Patient: Leonie Becker   : 1960  Diagnosis/ICD-10 Code:  Cervical pain [M54.2]  Referring practitioner: Wang De La Vega MD  Date of Initial Visit: Type: THERAPY  Noted: 2/15/2022  Today's Date: 3/22/2022  Patient seen for 5 sessions      Subjective:     Clinical Progress: improved  Home Program Compliance: Yes  Treatment has included:  therapeutic exercise, manual therapy, ultrasound, patient education with home exercise program  and dry needling     Subjective the shoulder area is better. Has been able to do some exercise. She met with Dr. Waldron about her cubital tunnel surgery. She reports that he suggested she see another surgeon for a second opinion. She is going to [facility] tomorrow to see Dr. Ruiz. Neck is always sore, not severe. Starting to get more headaches.  Objective     See Exercise, Manual, and Modality Logs for complete treatment.     Manual:  Pt in R SL, mild to medium STM of the R periscapular muscles and mid thoracic PVMs. Ischemic pressure for trigger points in her erector spinae and rhomboids, strumming of L UT and levator muscles    Spent time today discussing/listening about her MD appt with her surgeon. She is very frustrated with her lack of progress in the ulnar nerve pain and felt he was dismissive to her. He suggested an MD that she could see for a second opinion. She is having a 2nd opinion tomorrow ([facility]) to see if there is anything else that can be done. Discussed posture, body mechanics    Functional Outcome Score:   Neck Disability Index=14/45=31%    Assessment & Plan     Assessment    Assessment details: Leonie Becker has been seen for 5 physical therapy sessions for neck, shoulder, and arm pain.  Treatment has included therapeutic exercise, manual therapy, ultrasound, patient education with home exercise program  and dry needling . Progress to physical therapy goals is fair. Leonie was really flared up  with the dry needling session and it has taken awhile for her pain to reduce. Her pain around the scapular area has improved over the past couple of weeks and is better than at initial evaluation. She has reduced tightness and tenderness around the L scapula, UT, and levator muscles.  She will benefit from continued skilled physical therapy to address remaining impairments and functional limitations.     Prognosis: good    Goals  Plan Goals: ST wks  1. Patient will be independent with education for symptom management, joint protection and strategies to minimize stress on affected tissues (PART MET)   2. Pt to improve pain to no more than 6/10 with ADLs and playing guitar for 20 minutes (ONGOING)     LT wks  1. Pt to improve pain to no more than 2/10 with ADLs and with playing guitar for 40 minutes (ONGOING)   2. Pt to improve NDI score from 34% to 20% for overall functional improvement (ONGOING) improved to 31%  3. Pt to improve L shoulder strength to 4+/5 for greater ease with lifting and playing guitar (ONGOING)   4. Pt to be independent with HEP for symptom management and strengthening (ONGOING)     Plan  Therapy options: will be seen for skilled therapy services  Frequency: 1x week  Duration in weeks: 8  Treatment plan discussed with: patient  Plan details: Plan to start integrating strengthening into her program as she can tolerate           Recommendations: Continue as planned  Timeframe: 2 months  Prognosis to achieve goals: good    PT Signature: Griselda Lopez PT, CDNT    License Number: [id number]    Electronically signed by Griselda Lopez PT, 22, 1:31 PM EDT      Based upon review of the patient's progress and continued therapy plan, it is my medical opinion that Leonie Becker should continue physical therapy treatment at [facility] PHYSICAL THERAPY  [address] [place] [address]  [phone].    Signature: __________________________________   Wang De La Vega MD    Timed:  Manual Therapy:    15     mins  51405;  Therapeutic Exercise:         mins  32524;     Neuromuscular Miladis:        mins  71024;    Therapeutic Activity:     20     mins  74743;     Gait Training:           mins  13804;     Ultrasound:     10     mins  92679;    Iontophoresis         mins 67720;  Dry Needling                   mins 20560/20561 (Self-pay)    Untimed:  Electrical Stimulation:         mins  91884 ( );  Traction:         mins  03179;     Timed Treatment:   45   mins   Total Treatment:     45   mins

## 2022-03-31 ENCOUNTER — TELEPHONE (OUTPATIENT)
Dept: INTERNAL MEDICINE | Facility: CLINIC | Age: 62
End: 2022-03-31

## 2022-04-04 ENCOUNTER — TREATMENT (OUTPATIENT)
Dept: PHYSICAL THERAPY | Facility: CLINIC | Age: 62
End: 2022-04-04

## 2022-04-04 DIAGNOSIS — M54.2 CERVICAL PAIN: Primary | ICD-10-CM

## 2022-04-04 DIAGNOSIS — R68.89 DECREASED FUNCTIONAL ACTIVITY TOLERANCE: ICD-10-CM

## 2022-04-04 DIAGNOSIS — M79.602 PAIN OF LEFT UPPER EXTREMITY: ICD-10-CM

## 2022-04-04 DIAGNOSIS — M25.512 CHRONIC LEFT SHOULDER PAIN: ICD-10-CM

## 2022-04-04 DIAGNOSIS — G89.29 CHRONIC LEFT SHOULDER PAIN: ICD-10-CM

## 2022-04-04 PROCEDURE — 97110 THERAPEUTIC EXERCISES: CPT | Performed by: PHYSICAL THERAPIST

## 2022-04-04 PROCEDURE — 97035 APP MDLTY 1+ULTRASOUND EA 15: CPT | Performed by: PHYSICAL THERAPIST

## 2022-04-04 PROCEDURE — 97140 MANUAL THERAPY 1/> REGIONS: CPT | Performed by: PHYSICAL THERAPIST

## 2022-04-04 NOTE — PROGRESS NOTES
"Physical Therapy Daily Progress Note    Patient: Leonie Becker   : 1960  Diagnosis/ICD-10 Code:  Cervical pain [M54.2]  Referring practitioner: Wang De La Vega MD  Date of Initial Visit: Type: THERAPY  Noted: 2/15/2022  Today's Date: 2022  Patient seen for 6 sessions           Subjective Pain has been getting better, have been able to start back into the exercises. Not as much pain into the upper arm. Saw another MD for the cubital tunnel syndrome. Also going to see  for the arm for another opinion.     Objective   See Exercise, Manual, and Modality Logs for complete treatment.     Exercises:  -shoulder rolls x10  -scap ret and shoulder ext x10 (manual contacts at inferior angle of the scapula for cueing), yellow band  -seated B shoulder ER, yellow x10  -seated rhomboid stretch, \"hug a barrel\", 3x20 sec    Manual:  Pt in R SL, mild to medium STM of the R periscapular muscles and mid thoracic PVMs. Ischemic pressure for trigger points in her erector spinae and rhomboids, strumming of L UT and levator muscles    Assessment/Plan  Leonie is having decreased shoulder and neck pain. She has decreased tenderness and tightness of her periscapular and thoracic PVMs. Started with some light resistance exercises today for 3x a week to see how she will tolerate strengthening         Timed:    Manual Therapy:    15     mins  65576;  Therapeutic Exercise:    20     mins  38578;     Neuromuscular Miladis:        mins  13553;    Therapeutic Activity:          mins  91485;     Gait Training:           mins  51371;     Ultrasound:     10     mins  87770;    Electrical Stimulation:         mins  64905 ( );  Iontophoresis         mins 41468;  Aquatic Therapy         mins 12088;  Dry Needling                   mins 93405/  (Self-pay)    Untimed:  Electrical Stimulation:         mins  65747 ( );  Traction:         mins  11133;     Timed Treatment:   45   mins   Total Treatment:     45   " isidro Lopez, PT, CDNT  Physical Therapist    [place] License:[id number]

## 2022-04-14 NOTE — PROGRESS NOTES
Subjective   Patient ID: Leonie Becker is a 61 y.o. female is here today for ulnar neuropathy of BUE.  Pt called on 3/15/22 and reported L arm pain that radiated from her neck/shoulder with some tingling in her fingers.  She also has some thoracic pain.  Pt was referred to physical therapy at the last visit and dry needling was very painful.    Today, Ms. Becker reports that she went to physical therapy. She is now doing home exercises at home. She still does have back pain. She reports neck stiffness that radiates into the left arm with occasional numbness in left ring and pinky finger.     She is with her sister.  She saw Dr. Waldron after she had a repeat EMG and nerve conduction study by Dr. Gan February which showed some persistence of the ulnar neuropathy and compression.  She went to see Dr. Ruiz and Dr. Ortiz second opinions.  She told me  Dr. Waldron had some reluctance to reoperate on her.  Its been about 18 months since her decompression.  Both doctors Joseph and Diana recommended reoperation with transposition.  She asked my opinion about this and I told her that while I am not an expert on peripheral nerve surgery, the usual recommendation that is made if there is documented residual compression after a decompression is to transpose the nerve so she is still having pain in the ulnar distribution and does have a Tinel's sign.  The neck actually is doing reasonably well although she has some trapezius pain still and some modest neck pain.  The dry needling seem to help so we will manage the neck part conservatively.  While she has a bit of double crush syndrome I think it is mainly the peripheral nerve issues that are the most troublesome and I think reoperation is reasonable.  She could not tolerate the gabapentin so stopped it.  I encouraged her to get the repeat surgery done and see me in 6 months.        Back Pain  The problem has been gradually improving since onset. The quality of the pain is  described as aching and burning. Associated symptoms include numbness, tingling and weakness. Pertinent negatives include no bladder incontinence, bowel incontinence, chest pain or fever.   Neck Pain   The pain is present in the left side. The quality of the pain is described as aching and burning. The symptoms are aggravated by position. Associated symptoms include numbness, tingling and weakness. Pertinent negatives include no chest pain or fever.       The following portions of the patient's history were reviewed and updated as appropriate: allergies, current medications, past family history, past medical history, past social history, past surgical history and problem list.    Review of Systems   Constitutional: Negative for fever.   Respiratory: Negative for chest tightness and shortness of breath.    Cardiovascular: Negative for chest pain.   Gastrointestinal: Negative for bowel incontinence.   Genitourinary: Negative for bladder incontinence.   Musculoskeletal: Positive for back pain and neck pain.   Neurological: Positive for tingling, weakness and numbness.   All other systems reviewed and are negative.      Objective   Physical Exam  Constitutional:       Appearance: She is well-developed.   HENT:      Head: Normocephalic and atraumatic.   Eyes:      Extraocular Movements: EOM normal.      Conjunctiva/sclera: Conjunctivae normal.      Pupils: Pupils are equal, round, and reactive to light.   Neck:      Vascular: No carotid bruit.   Neurological:      Mental Status: She is oriented to person, place, and time.      Coordination: Finger-Nose-Finger Test and Heel to Shin Test normal.      Gait: Gait is intact.      Deep Tendon Reflexes:      Reflex Scores:       Tricep reflexes are 2+ on the right side and 2+ on the left side.       Bicep reflexes are 2+ on the right side and 2+ on the left side.       Brachioradialis reflexes are 2+ on the right side and 2+ on the left side.       Patellar reflexes are 2+ on  the right side and 2+ on the left side.       Achilles reflexes are 2+ on the right side and 2+ on the left side.  Psychiatric:         Speech: Speech normal.       Neurologic Exam     Mental Status   Oriented to person, place, and time.   Registration: recalls 2 of 3 objects (Good recent and remote memory).   Attention: normal. Concentration: normal.   Speech: speech is normal   Level of consciousness: alert  Knowledge: consistent with education.     Cranial Nerves     CN II   Visual fields full to confrontation.   Visual acuity: normal    CN III, IV, VI   Pupils are equal, round, and reactive to light.  Extraocular motions are normal.     CN V   Facial sensation intact.   Right corneal reflex: normal  Left corneal reflex: normal    CN VII   Facial expression full, symmetric.   Right facial weakness: none  Left facial weakness: none    CN VIII   Hearing: intact    CN IX, X   Palate: symmetric    CN XI   Right sternocleidomastoid strength: normal  Left sternocleidomastoid strength: normal    CN XII   Tongue: not atrophic  Tongue deviation: none    Motor Exam   Muscle bulk: normal  Right arm tone: normal  Left arm tone: normal  Right leg tone: normal  Left leg tone: normal    Strength   Strength 5/5 except as noted.     Sensory Exam   Light touch normal.     Gait, Coordination, and Reflexes     Gait  Gait: normal    Coordination   Finger to nose coordination: normal  Heel to shin coordination: normal    Reflexes   Right brachioradialis: 2+  Left brachioradialis: 2+  Right biceps: 2+  Left biceps: 2+  Right triceps: 2+  Left triceps: 2+  Right patellar: 2+  Left patellar: 2+  Right achilles: 2+  Left achilles: 2+  Right : 2+  Left : 2+      Assessment/Plan   Independent Review of Radiographic Studies:      I reviewed the cervical MRI done on 8/7/2020 which shows no evidence of any root compression at C7-T1.  There is a right-sided C6-C7 disc protrusion and on the left there is some foraminal stenosis with a  disc protrusion at that level compressing the left C4 nerve root.  At C4-C5 and C5-C6 there is some disc bulging and very mild foraminal stenosis.  I do not think the stenosis at C5-C6 is very significant.  The worst of it is seems to be at C3-C4 towards the left.  Otherwise agree with the report    Medical Decision Making:      I encouraged her to get reoperated on and get a transposition of the nerve.  I think that will probably help her although recovery may be longer.  We will continue to follow her for her neck which is not the major issue right now and see her in 6 months.      Diagnoses and all orders for this visit:    1. Ulnar neuropathy of both upper extremities (Primary)    2. Displacement of cervical intervertebral disc without myelopathy    3. Chronic neck pain      Return in about 6 months (around 10/25/2022) for Face-to-face.

## 2022-04-18 ENCOUNTER — TREATMENT (OUTPATIENT)
Dept: PHYSICAL THERAPY | Facility: CLINIC | Age: 62
End: 2022-04-18

## 2022-04-18 DIAGNOSIS — R68.89 DECREASED FUNCTIONAL ACTIVITY TOLERANCE: ICD-10-CM

## 2022-04-18 DIAGNOSIS — G89.29 CHRONIC LEFT SHOULDER PAIN: ICD-10-CM

## 2022-04-18 DIAGNOSIS — M25.512 CHRONIC LEFT SHOULDER PAIN: ICD-10-CM

## 2022-04-18 DIAGNOSIS — M79.602 PAIN OF LEFT UPPER EXTREMITY: ICD-10-CM

## 2022-04-18 DIAGNOSIS — M54.2 CERVICAL PAIN: Primary | ICD-10-CM

## 2022-04-18 PROCEDURE — 97110 THERAPEUTIC EXERCISES: CPT | Performed by: PHYSICAL THERAPIST

## 2022-04-18 PROCEDURE — 97140 MANUAL THERAPY 1/> REGIONS: CPT | Performed by: PHYSICAL THERAPIST

## 2022-04-18 NOTE — PROGRESS NOTES
"Physical Therapy Daily Progress Note    Patient: Leonie Becker   : 1960  Diagnosis/ICD-10 Code:  Cervical pain [M54.2]  Referring practitioner: Wang Taylor MD  Date of Initial Visit: Type: THERAPY  Noted: 2/15/2022  Today's Date: 2022  Patient seen for 7 sessions           Subjective the band exercises did seem to increase the pain. She had another opinion from a hand surgeon who recommended a revision surgery. The arm has been really painful from him palpating the ulnar nerve surgery. Sees Dr. Taylor on Monday.     Objective   See Exercise, Manual, and Modality Logs for complete treatment.     Exercises:  -shoulder rolls x10  -seated rhomboid stretch, \"hug a barrel\", 3x20 sec  -supine horiz abd and D2 flexion yellow, x15 each  -UT stretch, sitting on hand 3x20 sec each side  -levator stretch, 3x20 sec each side     Manual:  Pt in supine, mild to medium STM of the B UT, levator, and cervical PVMs with ischemic pressure for the B UT and levator for trigger point release    Assessment/Plan  Leonie has been having decreased pain and tightness of the neck and L periscapular muscles. She continues with difficulty with her ulnar nerve, even some of the lighter exercises that we have done flares it up. She has met with 2 other hand surgeons about her continued issues with cubital tunnel and she will likely have to have a revision. She is seeing Dr. taylor next week and will ask his opinion about her elbow. Pt on hold for therapy at this time until after her MD appt.          Timed:    Manual Therapy:    20     mins  68352;  Therapeutic Exercise:    23     mins  53730;     Neuromuscular Miladis:        mins  69286;    Therapeutic Activity:          mins  24777;     Gait Training:           mins  87538;     Ultrasound:          mins  88646;    Electrical Stimulation:         mins  49011 ( );  Iontophoresis         mins 44035;  Aquatic Therapy         mins 19595;  Dry Needling                   " mins 20560/ 20561 (Self-pay)    Untimed:  Electrical Stimulation:         mins  47792 ( );  Traction:         mins  95520;     Timed Treatment:  43    mins   Total Treatment:     43   mins    Griselda Lopez PT, CDNT  Physical Therapist    [place] License:[id number]

## 2022-04-19 ENCOUNTER — TELEPHONE (OUTPATIENT)
Dept: INTERNAL MEDICINE | Facility: CLINIC | Age: 62
End: 2022-04-19

## 2022-04-19 DIAGNOSIS — N30.10 CHRONIC INTERSTITIAL CYSTITIS: Primary | ICD-10-CM

## 2022-04-19 NOTE — TELEPHONE ENCOUNTER
Caller: Leonie Becker    Relationship: Self    Best call back number: [phone]    What is the medical concern/diagnosis: INTERSTITIAL CYSTITIS    What specialty or service is being requested: PHYSICAL THERAPY THAT SPECIALIZES IN PELVIC FLOOR THERAPY    What is the provider, practice or medical service name: [facility]    What is the office location: [facility] ([place], [place])    What is the office phone number: [phone]    Any additional details: PATIENT HAS AN APPOINTMENT 4-21-22

## 2022-04-25 ENCOUNTER — OFFICE VISIT (OUTPATIENT)
Dept: NEUROSURGERY | Facility: CLINIC | Age: 62
End: 2022-04-25

## 2022-04-25 VITALS
BODY MASS INDEX: 18.96 KG/M2 | WEIGHT: 107 LBS | TEMPERATURE: 97.3 F | DIASTOLIC BLOOD PRESSURE: 70 MMHG | SYSTOLIC BLOOD PRESSURE: 110 MMHG | HEART RATE: 61 BPM | HEIGHT: 63 IN | OXYGEN SATURATION: 100 %

## 2022-04-25 DIAGNOSIS — G56.23 ULNAR NEUROPATHY OF BOTH UPPER EXTREMITIES: Primary | ICD-10-CM

## 2022-04-25 DIAGNOSIS — M54.2 CHRONIC NECK PAIN: ICD-10-CM

## 2022-04-25 DIAGNOSIS — G89.29 CHRONIC NECK PAIN: ICD-10-CM

## 2022-04-25 DIAGNOSIS — M50.20 DISPLACEMENT OF CERVICAL INTERVERTEBRAL DISC WITHOUT MYELOPATHY: ICD-10-CM

## 2022-04-25 PROCEDURE — 99214 OFFICE O/P EST MOD 30 MIN: CPT | Performed by: NEUROLOGICAL SURGERY

## 2022-05-17 ENCOUNTER — TELEPHONE (OUTPATIENT)
Dept: NEUROSURGERY | Facility: CLINIC | Age: 62
End: 2022-05-17

## 2022-05-17 DIAGNOSIS — M50.20 DISPLACEMENT OF CERVICAL INTERVERTEBRAL DISC WITHOUT MYELOPATHY: ICD-10-CM

## 2022-05-17 DIAGNOSIS — M54.2 CHRONIC NECK PAIN: ICD-10-CM

## 2022-05-17 DIAGNOSIS — G89.29 CHRONIC NECK PAIN: ICD-10-CM

## 2022-05-17 DIAGNOSIS — M54.2 NECK PAIN: Primary | ICD-10-CM

## 2022-05-17 DIAGNOSIS — G56.23 ULNAR NEUROPATHY OF BOTH UPPER EXTREMITIES: ICD-10-CM

## 2022-05-17 DIAGNOSIS — M50.30 DEGENERATION OF CERVICAL INTERVERTEBRAL DISC: ICD-10-CM

## 2022-05-17 NOTE — TELEPHONE ENCOUNTER
Provider: HANY  Caller:PATIENT     Pharmacy: [facility]    Reason for Call: PAIN IN NECK  When was the patient last seen: 04/25/22  When did it start: Friday EVENING  Where is it located: RIGHT SIDE OF NECK DOWN ARM  Characteristics of symptom/severity:  HIGHEST 9 LOWEST 6  Timing- Is it constant or intermittent: CONSTANT   What makes it worse: WALKING AND MOVING  What makes it better: LAYING IN BED AND RESTING  What therapies/medications have you tried: OXYCODON 5 MG, BACLOPHEN, VALIUM.       NO NUMBNESS/TINGLING IN HANDS  NO DIZZINESS  PAIN IN SCAPULA ON RIGHT SIDE    PATIENT WOULD LIKE TO SEE IF PHYSICAL THERAPY, OR A PA CAN HELP SEE HER TO FIND OUT WHAT IS GOING ON.

## 2022-05-18 NOTE — TELEPHONE ENCOUNTER
I called patient and she stated that she would like to go to [facility]. I have faxed orders.

## 2022-05-24 ENCOUNTER — TRANSCRIBE ORDERS (OUTPATIENT)
Dept: ADMINISTRATIVE | Facility: HOSPITAL | Age: 62
End: 2022-05-24

## 2022-05-24 DIAGNOSIS — M50.10 CERVICAL DISC DISORDER WITH RADICULOPATHY: Primary | ICD-10-CM

## 2022-06-07 ENCOUNTER — TELEPHONE (OUTPATIENT)
Dept: INTERNAL MEDICINE | Facility: CLINIC | Age: 62
End: 2022-06-07

## 2022-06-07 NOTE — TELEPHONE ENCOUNTER
Caller: Leonie Becker    Relationship: Self  Best call back number: [phone]   What is the best time to reach you:  ANY TIME    Who are you requesting to speak with (clinical staff, provider,  specific staff member): DR. RUVALCABA     What was the call regarding: PATIENT STATES THAT SHE GETS EXTENSIVE PHYSICAL THERAPY FOR A PINCHED NERVE AND IS ON A LOT OF PAIN MEDICATIONS SINCE MAY 14TH.  SHE STATES IT HAS CAUSED HER EXTENSIVE CONSTIPATION AS WELL AS IMPACTION ON ONE OCCASION.  PATIENT WOULD LIKE DR. RUVALCABA TO SUGGEST AN APPROPRIATE EFFECTIVE LAXATIVE  TO ADDRESS THIS ISSUE.  SHE STATES THAT SHE WILL NEED TO REMAIN ON THESE PAIN MEDS THROUGH AT LEAST June 13TH WHEN SHE GETS A STEROIDAL INJECTION IN HER CERVICAL SPINE.   PATIENT WANTS TO REMIND DR. RUVALCABA THAT SHE HAS A DIAGNOSIS OF INTERSTITIAL CYSTITIS, SO CANNOT HAVE FLAVORED THINGS.   PLEASE CONTACT PATIENT TO ADVISE WHAT STEPS SHE NEEDS TO TAKE.      Do you require a callback: YES

## 2022-06-12 ENCOUNTER — APPOINTMENT (OUTPATIENT)
Dept: MRI IMAGING | Facility: HOSPITAL | Age: 62
End: 2022-06-12

## 2022-06-23 ENCOUNTER — TELEPHONE (OUTPATIENT)
Dept: NEUROSURGERY | Facility: CLINIC | Age: 62
End: 2022-06-23

## 2022-06-23 NOTE — TELEPHONE ENCOUNTER
Caller: Leonie Becker    Relationship to patient: Self    Best call back number:[phone]    Chief complaint:SEVERE ARM PAIN AND ISSUES    Type of visit:FOLLOW UP    Requested date:ASAP    If rescheduling, when is the original appointment:NA    Additional notes:PT CALLED AND STATES THAT SHE IS HAVING A HGH LEVEL OF DIS-FUNCTION IN BOTH ARMS-PT STATES THAT SHE HAS COMPLETED HER PHYSICAL THERAPY-PT STATES THAT SHE HAS SEEN 2 HAND SPECIALIST WHO STATE PT NEEDS TO COME AND SEE OUR OFFICE-PT STATES THAT  IS GOING TO DO SURGERY BUT IT IS ON HOLD DUE TO INCREASE IN PROBLEMS WITH PTS ARMS AND HER FUNCTIONALITY-PT STATES THAT  IS ORDERING A EMG/NCV TEST-PT IS STATING SHE  IS NEEDING TO SEE THE APRN FOR A SOONER APPT. AS THESE ISSUES ARE STEMMING FROM HER NECK-PT STATES THAT THE PHYSICAL THERAPIST IS SENDING HER NOTES OVER FOR OFFICE TO REVIEW DUE TO INCREASE IN PROBLEMS-PLEASE CONTACT PT FOR AN APPT.-PT STATES THAT THE PROBLEM HAS GOTTEN DRASTICALLY WORSE-HUB CAN NOT SCHEDULE WITH APRN-THANK YOU!

## 2022-06-27 NOTE — TELEPHONE ENCOUNTER
Her EMG was complete, report is in the chart. She was last seen on 4/25/22. She is scheduled on 10/26/22 with Dr. ZAYAS, he does not have availability for sooner appointment.

## 2022-06-29 NOTE — PROGRESS NOTES
Subjective   Patient ID: Leonie Becker is a 61 y.o. female is here today for follow-up of chronic neck pain. She was last seen in office 04/25/2022. She does not have any recent imaging.     Today Ms. Becker reports she had an increase of neck pain in mid May. She reports of neck pain that's mainly right sided. She reports of radiating pain in her arms and hands. She reports of weakness in her hands. She reports of numbness and tingling in her right hand.  She reports of a burning sensation on her shoulder, neck and face. She reports she has been doing physical therapy and it has helped. She had a recent epidural and it has also helped.     History of Present Illness    Reports an increase in chronic neck pain around 5/13 this year following an increase in cleaning around her home.  Describes her neck pain as midline and diffuse.  Described the pain as a burning sensation.  States she sat down on it and had a sensation radiating down her right arm and when she woke up the next morning she could not move her right arm.  Mobility of the right arm improved following a couple physical therapy sessions.  States the pain now radiates down bilateral arms R > L.  Her movement is exacerbated by flexion and turning her head to the right.  Patient got increasingly worried around 5/25 this year when she noticed her right thumb, pointer finger, and middle finger suddenly became numb when driving home from physical therapy.  Now states this numbness is primarily focused in the fingertips these 3 fingers.  Pain is exacerbated with overhead activities.  He follows with pain management and had a cervical epidural injection which she feels helped some.  Patient also has chronic tenderness and does not want to take gabapentin as she is worried this may exacerbate that.  Denies abdominal N/T, saddle anesthesia, and incontinence of bowel or bladder.  Denies issues with coordination, falls, and gait difficulty.  States physical therapy  "and following with pain management helps her symptoms.    Review of Systems   Constitutional: Positive for activity change.   HENT: Positive for tinnitus.    Gastrointestinal:        No incontinence of bowel   Genitourinary:        No incontinence of bladder   Musculoskeletal: Positive for neck pain. Negative for gait problem.   Neurological: Positive for weakness and numbness.        No coordination difficulty   Psychiatric/Behavioral: Negative for sleep disturbance.       Tobacco Use: Medium Risk   • Smoking Tobacco Use: Former Smoker   • Smokeless Tobacco Use: Never Used     Leonie Becker  reports that she quit smoking about 28 years ago. She started smoking about 30 years ago. She quit after 2.00 years of use. She has never used smokeless tobacco.       Objective     Vitals:    06/30/22 1320   BP: 104/70   Pulse: 71   Temp: 97.8 °F (36.6 °C)   SpO2: 98%   Weight: 49 kg (108 lb)   Height: 160 cm (63\")     Body mass index is 19.13 kg/m².      Physical Exam  Constitutional:       General: She is not in acute distress.     Appearance: Normal appearance. She is obese. She is not ill-appearing, toxic-appearing or diaphoretic.   Neck:      Comments:   Negative Lhermitte's  Negative Spurling  Pulmonary:      Effort: Pulmonary effort is normal. No respiratory distress.   Musculoskeletal:      Cervical back: Normal range of motion. No edema, rigidity or crepitus. Pain with movement (pain with flexion and rotation to right), spinous process tenderness and muscular tenderness present. Normal range of motion.   Neurological:      Mental Status: She is alert and oriented to person, place, and time.      Gait: Gait is intact.      Deep Tendon Reflexes:      Reflex Scores:       Tricep reflexes are 1+ on the right side and 1+ on the left side.       Bicep reflexes are 1+ on the right side and 1+ on the left side.       Brachioradialis reflexes are 1+ on the right side and 1+ on the left side.       Patellar reflexes are 1+ on " the right side and 1+ on the left side.       Achilles reflexes are 1+ on the right side and 1+ on the left side.  Psychiatric:         Speech: Speech normal.       Neurologic Exam     Mental Status   Oriented to person, place, and time.   Attention: normal. Concentration: normal.   Speech: speech is normal   Level of consciousness: alert  Knowledge: good.     Motor Exam   Muscle bulk: normal  Overall muscle tone: normal  Right arm tone: normal  Left arm tone: normal  Right leg tone: normal  Left leg tone: normal    Strength   Right biceps: 5/5  Left biceps: 5/5  Right triceps: 5/5  Left triceps: 5/5  Right interossei: 5/5  Left interossei: 5/5  Right iliopsoas: 5/5  Left iliopsoas: 5/5  Right quadriceps: 5/5  Left quadriceps: 5/5  Right anterior tibial: 5/5  Left anterior tibial: 5/5  Right gastroc: 5/5  Left gastroc: 5/5    Sensory Exam   Right arm light touch: normal  Left arm light touch: normal  Right leg light touch: normal  Left leg light touch: normal  Right arm pinprick: normal  Left arm pinprick: normal    Gait, Coordination, and Reflexes     Gait  Gait: normal    Reflexes   Right brachioradialis: 1+  Left brachioradialis: 1+  Right biceps: 1+  Left biceps: 1+  Right triceps: 1+  Left triceps: 1+  Right patellar: 1+  Left patellar: 1+  Right achilles: 1+  Left achilles: 1+  Right Martinez: absent  Left Martinez: absent  Right ankle clonus: absent  Left ankle clonus: absent          Assessment & Plan      I personally reviewed the report from the following studies: MRI cervical 2020.    Medical Decision Makin-year-old female who presents with 5 weeks of worsening chronic neck pain radiating down bilateral extremities.  She also has several peripheral neuropathies in her arms and has had surgery for ulnar entrapment in the past.  On exam, she does not have any red flag symptoms of cervical myelopathy including hyperreflexia or Martinez's.  On history, she denies red flag symptoms of cervical  myelopathy including frequent falls and coordination issues.  She is extremely concerned as she developed numbness in the first 3 digits of her right hand about a month ago.  She has not had any imaging of her neck in 2 years.  At this time, we will proceed with an MRI cervical to evaluate if her condition has progressed and continue physical therapy and pain management as both of these have helped her.  Recommend following up with Dr. De La Vega after the imaging.    Diagnoses and all orders for this visit:    1. Chronic neck pain (Primary)  -     MRI Cervical Spine Without Contrast; Future  -     Ambulatory Referral to Physical Therapy Evaluate and treat    2. Degeneration of cervical intervertebral disc  -     MRI Cervical Spine Without Contrast; Future  -     Ambulatory Referral to Physical Therapy Evaluate and treat    3. Cervical radiculopathy  -     MRI Cervical Spine Without Contrast; Future  -     Ambulatory Referral to Physical Therapy Evaluate and treat      Return in about 2 months (around 8/30/2022) for Follow-up after imaging with Dr. De La Vega.

## 2022-06-30 ENCOUNTER — OFFICE VISIT (OUTPATIENT)
Dept: NEUROSURGERY | Facility: CLINIC | Age: 62
End: 2022-06-30

## 2022-06-30 VITALS
WEIGHT: 108 LBS | TEMPERATURE: 97.8 F | DIASTOLIC BLOOD PRESSURE: 70 MMHG | BODY MASS INDEX: 19.14 KG/M2 | HEART RATE: 71 BPM | HEIGHT: 63 IN | SYSTOLIC BLOOD PRESSURE: 104 MMHG | OXYGEN SATURATION: 98 %

## 2022-06-30 DIAGNOSIS — G89.29 CHRONIC NECK PAIN: Primary | ICD-10-CM

## 2022-06-30 DIAGNOSIS — M54.2 CHRONIC NECK PAIN: Primary | ICD-10-CM

## 2022-06-30 DIAGNOSIS — M50.30 DEGENERATION OF CERVICAL INTERVERTEBRAL DISC: ICD-10-CM

## 2022-06-30 DIAGNOSIS — M54.12 CERVICAL RADICULOPATHY: ICD-10-CM

## 2022-06-30 PROCEDURE — 99214 OFFICE O/P EST MOD 30 MIN: CPT

## 2022-07-21 ENCOUNTER — TELEPHONE (OUTPATIENT)
Dept: NEUROSURGERY | Facility: CLINIC | Age: 62
End: 2022-07-21

## 2022-07-21 NOTE — TELEPHONE ENCOUNTER
PATIENT CALLED TO SEE IF RECENT EMG REPORT (July 2022) WAS FAXED FROM [facility] (DR JOLLY). I ADVISED REPORT IS NOT IN CHART.    PATIENT STATES SHE WILL CONTACT [facility] TO MAKE SURE THEY FAX THIS; I ADVISED I WOULD SEND ENCOUNTER TO MAKE SURE OUR OFFICE IS ON THE LOOKOUT FOR IT. PATIENT EXPRESSED UNDERSTANDING AND DOES NOT REQUIRE A CALL BACK UNLESS THERE ARE ANY FURTHER PROBLEMS OBTAINING REPORT.

## 2022-08-10 ENCOUNTER — HOSPITAL ENCOUNTER (OUTPATIENT)
Dept: MRI IMAGING | Facility: HOSPITAL | Age: 62
Discharge: HOME | End: 2022-08-10

## 2022-08-10 ENCOUNTER — APPOINTMENT (OUTPATIENT)
Dept: OTHER | Facility: HOSPITAL | Age: 62
End: 2022-08-10

## 2022-08-10 DIAGNOSIS — Z09 FOLLOW-UP EXAM: ICD-10-CM

## 2022-08-10 DIAGNOSIS — M50.30 DEGENERATION OF CERVICAL INTERVERTEBRAL DISC: ICD-10-CM

## 2022-08-10 DIAGNOSIS — M54.2 CHRONIC NECK PAIN: ICD-10-CM

## 2022-08-10 DIAGNOSIS — M54.12 CERVICAL RADICULOPATHY: ICD-10-CM

## 2022-08-10 DIAGNOSIS — G89.29 CHRONIC NECK PAIN: ICD-10-CM

## 2022-08-10 PROCEDURE — 72141 MRI NECK SPINE W/O DYE: CPT

## 2022-08-17 NOTE — PROGRESS NOTES
Subjective   Patient ID: Leonie Becker is a 62 y.o. female is here today for follow-up on an MRI cervical done on 8/10/22 for chronic neck pain.  Pt last seen on 6/30/22 and reported increased neck pain mostly on the R side that radiated down her arms to the hands. She also c/o W/N/T in her hands.  Pt was referred to physical therapy at her last visit.    Ms. Becker reports that with physical therapy she is getting some relief. She is still having bilateral neck pain with bilateral arm and hand pain. She does have numbness, tingling and weakness of her arms as well.     I last saw this patient about 4 months ago.  At that time her neck was actually doing well but about 2 and half months ago she began having pain in her neck running down her right arm in a way that it had not for quite some time.  She even had a little bit of left radiating arm pain coming from the neck.  This alarmed her particularly with numbness and tingling but with therapy for the neck and an epidural block by Dr. Almanza from [facility] it has settled down.  She has good strength.  She is not myelopathic.  But this did make her hesitate having a reoperative ulnar transposition surgery on the left with Dr. Ortiz and also possibly a left carpal tunnel release which she had discussed with her.  She had a repeat EMG that was done by Dr. Gan recently that showed evidence of double crush syndrome meaning both cervical and peripheral nerve entrapment.  I agree with this and this does not surprise me.  The cervical symptoms can come and go and they happen to flareup in the last few months.  But I do not see any compelling reason given the improvement in the absence of any motor deficit treat this with surgery in the neck such as an ACDF at C5-C6.  I prefer that we continue therapy and she get another injection by Dr. Almanza and see me in 3 months.  She wants to hold off and I recommended that she hold off on any decision about her revision ulnar  transposition.  We will see her in 3 months and see how she does.    She is no longer working with Dr. Waldron.    Neck Pain   This is a chronic problem. The current episode started more than 1 year ago. The problem occurs constantly. The problem has been unchanged. The pain is present in the left side and right side. The quality of the pain is described as aching. The pain is at a severity of 4/10. The pain is mild. The symptoms are aggravated by position. Associated symptoms include numbness, tingling and weakness. Pertinent negatives include no fever.       The following portions of the patient's history were reviewed and updated as appropriate: allergies, current medications, past family history, past medical history, past social history, past surgical history and problem list.    Review of Systems   Constitutional: Negative for activity change and fever.   Musculoskeletal: Positive for neck pain.   Neurological: Positive for tingling, weakness and numbness.   Psychiatric/Behavioral: Negative for sleep disturbance.   All other systems reviewed and are negative.      Objective   Physical Exam  Constitutional:       Appearance: She is well-developed.   HENT:      Head: Normocephalic and atraumatic.   Eyes:      Extraocular Movements: EOM normal.      Conjunctiva/sclera: Conjunctivae normal.      Pupils: Pupils are equal, round, and reactive to light.   Neck:      Vascular: No carotid bruit.   Neurological:      Mental Status: She is oriented to person, place, and time.      Coordination: Finger-Nose-Finger Test and Heel to Shin Test normal.      Gait: Gait is intact.      Deep Tendon Reflexes:      Reflex Scores:       Tricep reflexes are 2+ on the right side and 2+ on the left side.       Bicep reflexes are 2+ on the right side and 2+ on the left side.       Brachioradialis reflexes are 2+ on the right side and 2+ on the left side.       Patellar reflexes are 2+ on the right side and 2+ on the left side.        Achilles reflexes are 2+ on the right side and 2+ on the left side.  Psychiatric:         Speech: Speech normal.       Neurologic Exam     Mental Status   Oriented to person, place, and time.   Registration of memory: Good recent and remote memory.   Attention: normal. Concentration: normal.   Speech: speech is normal   Level of consciousness: alert  Knowledge: consistent with education.     Cranial Nerves     CN II   Visual fields full to confrontation.   Visual acuity: normal    CN III, IV, VI   Pupils are equal, round, and reactive to light.  Extraocular motions are normal.     CN V   Facial sensation intact.   Right corneal reflex: normal  Left corneal reflex: normal    CN VII   Facial expression full, symmetric.   Right facial weakness: none  Left facial weakness: none    CN VIII   Hearing: intact    CN IX, X   Palate: symmetric    CN XI   Right sternocleidomastoid strength: normal  Left sternocleidomastoid strength: normal    CN XII   Tongue: not atrophic  Tongue deviation: none    Motor Exam   Muscle bulk: normal  Right arm tone: normal  Left arm tone: normal  Right leg tone: normal  Left leg tone: normal    Strength   Strength 5/5 except as noted.     Sensory Exam   Light touch normal.     Gait, Coordination, and Reflexes     Gait  Gait: normal    Coordination   Finger to nose coordination: normal  Heel to shin coordination: normal    Reflexes   Right brachioradialis: 2+  Left brachioradialis: 2+  Right biceps: 2+  Left biceps: 2+  Right triceps: 2+  Left triceps: 2+  Right patellar: 2+  Left patellar: 2+  Right achilles: 2+  Left achilles: 2+  Right : 2+  Left : 2+      Assessment & Plan   Independent Review of Radiographic Studies:      I reviewed the cervical MRI done on 8/10/2022 which shows almost the same degree of cervical disc disease particularly at C5-C6 where there is some foraminal entrapment C6-C7 actually does not look all that narrowed particularly on the left nor the C7-T1.  There is  a little bit of progression of the left C3-C4 foraminal stenosis.  Agree with the report.  I think the C5-C6 level is the most symptomatic.  Agree with the report.      Medical Decision Making:      We will focus on the neck and to treat it nonoperatively.  She will get another epidural block by Dr. Almanza at [facility].  She will continue her cervical PT.  We will send her to the hand therapist, Lisa Strange, to see if there is some exercises she can do for her left carpal tunnel syndrome.  I will see her in 3 months.  We will see if we get the neck symptoms to settle down without surgery at C5-C6.  If were successful in doing that then perhaps her attention can be turned again to reoperative ulnar surgery and perhaps even a carpal tunnel release by Dr. Ortiz.     Diagnoses and all orders for this visit:    1. Chronic neck pain (Primary)    2. Carpal tunnel syndrome of right wrist  -     Ambulatory Referral to Physical Therapy Evaluate and treat    3. Ulnar neuropathy of both upper extremities  -     Ambulatory Referral to Physical Therapy Evaluate and treat    4. Cervical disc disorder at C5-C6 level with radiculopathy      Return in about 3 months (around 11/22/2022) for Face-to-face.

## 2022-08-22 ENCOUNTER — OFFICE VISIT (OUTPATIENT)
Dept: NEUROSURGERY | Facility: CLINIC | Age: 62
End: 2022-08-22

## 2022-08-22 VITALS
HEIGHT: 63 IN | DIASTOLIC BLOOD PRESSURE: 60 MMHG | SYSTOLIC BLOOD PRESSURE: 90 MMHG | WEIGHT: 108.47 LBS | OXYGEN SATURATION: 99 % | BODY MASS INDEX: 19.22 KG/M2 | TEMPERATURE: 97.7 F | HEART RATE: 64 BPM

## 2022-08-22 DIAGNOSIS — M54.2 CHRONIC NECK PAIN: Primary | ICD-10-CM

## 2022-08-22 DIAGNOSIS — G89.29 CHRONIC NECK PAIN: Primary | ICD-10-CM

## 2022-08-22 DIAGNOSIS — M50.122 CERVICAL DISC DISORDER AT C5-C6 LEVEL WITH RADICULOPATHY: ICD-10-CM

## 2022-08-22 DIAGNOSIS — G56.01 CARPAL TUNNEL SYNDROME OF RIGHT WRIST: ICD-10-CM

## 2022-08-22 DIAGNOSIS — G56.23 ULNAR NEUROPATHY OF BOTH UPPER EXTREMITIES: ICD-10-CM

## 2022-08-22 PROCEDURE — 99214 OFFICE O/P EST MOD 30 MIN: CPT | Performed by: NEUROLOGICAL SURGERY

## 2022-09-29 ENCOUNTER — TELEPHONE (OUTPATIENT)
Dept: INTERNAL MEDICINE | Facility: CLINIC | Age: 62
End: 2022-09-29

## 2022-09-29 NOTE — TELEPHONE ENCOUNTER
Caller: Leonie Becker    Relationship: Self    Best call back number: [phone]     What is the best time to reach you: ANY    Who are you requesting to speak with (clinical staff, provider,  specific staff member): DR. RUVALCABA OR MA    What was the call regarding: EAR ISSUES AFTER HAVING THE 4TH COVID BOOSTER ON 09/19/22 - EAR DOCTOR WILL NOT RETURN CALL.    Do you require a callback: YES

## 2022-09-29 NOTE — TELEPHONE ENCOUNTER
Patient states she had been calling ENT and he was out of office and wasn't getting in call backs. He returned to office today but she still hasn't gotten a phone call. I advised to give them some time today and then try to call them back. Since he is probably getting caught up from being out of the office. I advised patient Dr Oconnell is out until 10/4 and I could try to get her in with another pcp but they would probably refer to ENT.   She will try calling ENT today.

## 2022-09-30 ENCOUNTER — TELEPHONE (OUTPATIENT)
Dept: INTERNAL MEDICINE | Facility: CLINIC | Age: 62
End: 2022-09-30

## 2022-09-30 NOTE — TELEPHONE ENCOUNTER
Having difficulty with side effect from Covid vaccine. She has an issue with Tinnitus .  She feels this Tinnitus may have come from the vaccine.    [facility] [phone]    She was prescribed steroids.  She would like to speak with the covering physician prior to taking the mediation      II returned patient's call. States she has severe permanent tinnitus. She took COVID booster and found tinnitus worsened. She is wondering if she should take prednisone because she called ENT and talked to a physician that was not familiar with her and he said he could not recommend it because he did not know her medical history. She is concerned this will worsen her bone density and cause weight gain. She has 2 week prescription.  I reassured her that a short dose of steroids is unlikely to have significant side effects.

## 2022-10-18 ENCOUNTER — DOCUMENTATION (OUTPATIENT)
Dept: PHYSICAL THERAPY | Facility: CLINIC | Age: 62
End: 2022-10-18

## 2022-10-18 DIAGNOSIS — M54.2 CERVICAL PAIN: Primary | ICD-10-CM

## 2022-10-18 DIAGNOSIS — M25.512 CHRONIC LEFT SHOULDER PAIN: ICD-10-CM

## 2022-10-18 DIAGNOSIS — G89.29 CHRONIC LEFT SHOULDER PAIN: ICD-10-CM

## 2022-10-18 DIAGNOSIS — R68.89 DECREASED FUNCTIONAL ACTIVITY TOLERANCE: ICD-10-CM

## 2022-10-18 DIAGNOSIS — M79.602 PAIN OF LEFT UPPER EXTREMITY: ICD-10-CM

## 2022-10-18 NOTE — PROGRESS NOTES
Discharge Summary  Discharge Summary from Physical Therapy Report      Leonie Becker was seen for 7 physical therapy sessions for neck, shoulder, and arm pain.  Treatment included therapeutic exercise, manual therapy, therapeutic activity, neuro-muscular retraining , ultrasound and patient education with home exercise program . Progress to physical therapy goals was slow. Leonie had reports of  having decreased pain and tightness of the neck and L periscapular muscles at her last visit. She continues with difficulty with her ulnar nerve, even some of the lighter exercises that we have done flares it up. She has met with 2 other hand surgeons about her continued issues with cubital tunnel and she will likely have to have a revision. She was returning Dr. taylor.   She was discharged to an independent HEP and provided patient education to self-manage condition.      Goals:    ST wks  1. Patient will be independent with education for symptom management, joint protection and strategies to minimize stress on affected tissues (PART MET)   2. Pt to improve pain to no more than 6/10 with ADLs and playing guitar for 20 minutes (NOT MET)      LT wks  1. Pt to improve pain to no more than 2/10 with ADLs and with playing guitar for 40 minutes (NOT MET)   2. Pt to improve NDI score from 34% to 20% for overall functional improvement (NOT MET)  improved to 31%  3. Pt to improve L shoulder strength to 4+/5 for greater ease with lifting and playing guitar (NOT MET)   4. Pt to be independent with HEP for symptom management and strengthening (PART MET)      Discharge Plan: Patient to return to referring/providing physician    Date of Last Physical Therapy Visit    22        Griselda Lopez, PT  Physical Therapist

## 2022-11-16 NOTE — PROGRESS NOTES
Subjective   Patient ID: Leonie Becker is a 62 y.o. female is here today for follow-up after physical therapy. Ms. Becker was last seen on 08-22-22 with complaints of bilateral neck pain with bilateral arm and hand pain with numbness, tingling and weakness.    Today, Ms. Becker reports neck pain that radiates into bilateral arms with tingling. She reports numbness in her left ringer and pinky finger. She reports daily neck pain.     This woman has double crush syndrome having cervical root compression and neck pain mainly from C5-C6 as well as ulnar and carpal tunnel problems.  She is decided against getting a reoperative ulnar transposition or carpal tunnel reoperation.  She got an injection shortly before she saw me last and it did seem to help but wants to try another 1 by Dr. Almanza at [facility].  She finished therapy and is doing her exercises.  I reassured her that I did not think she was at risk for outright paralysis since she really does not have myelopathy or cord compression but she does have radicular compression and some modest mechanical signs from the neck.  An ACDF at C5-C6 could be considered.  There is almost no doubt that they would be some residual symptoms because of the presence of some peripheral entrapments but that is certainly an option.  She wants to do her own therapy and try another block which is fine with me.  I will see her in 4 months.        History of Present Illness    The following portions of the patient's history were reviewed and updated as appropriate: allergies, current medications, past family history, past medical history, past social history, past surgical history and problem list.    Review of Systems   Constitutional: Positive for appetite change.   Respiratory: Negative for chest tightness and shortness of breath.    Cardiovascular: Negative for chest pain.   Musculoskeletal: Positive for neck pain.   All other systems reviewed and are negative.          Objective  "    Vitals:    11/21/22 1352   BP: 130/80   BP Location: Right arm   Pulse: 79   SpO2: 98%   Weight: 49.2 kg (108 lb 7.5 oz)   Height: 160 cm (62.99\")     Body mass index is 19.22 kg/m².    Tobacco Use: Medium Risk   • Smoking Tobacco Use: Former   • Smokeless Tobacco Use: Never   • Passive Exposure: Not on file          Physical Exam  Constitutional:       Appearance: She is well-developed.   HENT:      Head: Normocephalic and atraumatic.   Eyes:      Extraocular Movements: EOM normal.      Conjunctiva/sclera: Conjunctivae normal.      Pupils: Pupils are equal, round, and reactive to light.   Neck:      Vascular: No carotid bruit.   Neurological:      Mental Status: She is oriented to person, place, and time.      Coordination: Finger-Nose-Finger Test and Heel to Shin Test normal.      Gait: Gait is intact.      Deep Tendon Reflexes:      Reflex Scores:       Tricep reflexes are 2+ on the right side and 2+ on the left side.       Bicep reflexes are 2+ on the right side and 2+ on the left side.       Brachioradialis reflexes are 2+ on the right side and 2+ on the left side.       Patellar reflexes are 2+ on the right side and 2+ on the left side.       Achilles reflexes are 2+ on the right side and 2+ on the left side.  Psychiatric:         Speech: Speech normal.       Neurologic Exam     Mental Status   Oriented to person, place, and time.   Registration of memory: Good recent and remote memory.   Attention: normal. Concentration: normal.   Speech: speech is normal   Level of consciousness: alert  Knowledge: consistent with education.     Cranial Nerves     CN II   Visual fields full to confrontation.   Visual acuity: normal    CN III, IV, VI   Pupils are equal, round, and reactive to light.  Extraocular motions are normal.     CN V   Facial sensation intact.   Right corneal reflex: normal  Left corneal reflex: normal    CN VII   Facial expression full, symmetric.   Right facial weakness: none  Left facial " weakness: none    CN VIII   Hearing: intact    CN IX, X   Palate: symmetric    CN XI   Right sternocleidomastoid strength: normal  Left sternocleidomastoid strength: normal    CN XII   Tongue: not atrophic  Tongue deviation: none    Motor Exam   Muscle bulk: normal  Right arm tone: normal  Left arm tone: normal  Right leg tone: normal  Left leg tone: normal    Strength   Strength 5/5 except as noted.     Sensory Exam   Light touch normal.     Gait, Coordination, and Reflexes     Gait  Gait: normal    Coordination   Finger to nose coordination: normal  Heel to shin coordination: normal    Reflexes   Right brachioradialis: 2+  Left brachioradialis: 2+  Right biceps: 2+  Left biceps: 2+  Right triceps: 2+  Left triceps: 2+  Right patellar: 2+  Left patellar: 2+  Right achilles: 2+  Left achilles: 2+  Right : 2+  Left : 2+          Assessment & Plan   Independent Review of Radiographic Studies:      I personally reviewed the images from the following studies.    I reviewed the cervical MRI done on 8/10/2022 which shows almost the same degree of cervical disc disease particularly at C5-C6 where there is some foraminal entrapment C6-C7 actually does not look all that narrowed particularly on the left nor the C7-T1.  There is a little bit of progression of the left C3-C4 foraminal stenosis.  Agree with the report.  I think the C5-C6 level is the most symptomatic.  Agree with the report.    Medical Decision Making:      We will continue to watch her.  She will get another injection by Dr. Almanza in the neck.  She will continue her exercises and I will see her in 4 months.  I do think some of her symptoms come from the neck, particularly at C5-C6 and if worse comes to worse and we have no other option than an ACDF at C5-C6 could be considered.  Because she has double crush syndrome is almost inevitable that she is going to have some leftover symptoms in the arms even if the neck surgery goes well  however.      Diagnoses and all orders for this visit:    1. Chronic neck pain (Primary)    2. Carpal tunnel syndrome of right wrist    3. Ulnar neuropathy of both upper extremities    4. Cervical disc disorder at C5-C6 level with radiculopathy      Return in about 4 months (around 3/21/2023) for face to face.

## 2022-11-21 ENCOUNTER — OFFICE VISIT (OUTPATIENT)
Dept: NEUROSURGERY | Facility: CLINIC | Age: 62
End: 2022-11-21

## 2022-11-21 VITALS
HEART RATE: 79 BPM | DIASTOLIC BLOOD PRESSURE: 80 MMHG | WEIGHT: 108.47 LBS | OXYGEN SATURATION: 98 % | BODY MASS INDEX: 19.22 KG/M2 | HEIGHT: 63 IN | SYSTOLIC BLOOD PRESSURE: 130 MMHG

## 2022-11-21 DIAGNOSIS — M54.2 CHRONIC NECK PAIN: Primary | ICD-10-CM

## 2022-11-21 DIAGNOSIS — G56.01 CARPAL TUNNEL SYNDROME OF RIGHT WRIST: ICD-10-CM

## 2022-11-21 DIAGNOSIS — G89.29 CHRONIC NECK PAIN: Primary | ICD-10-CM

## 2022-11-21 DIAGNOSIS — M50.122 CERVICAL DISC DISORDER AT C5-C6 LEVEL WITH RADICULOPATHY: ICD-10-CM

## 2022-11-21 DIAGNOSIS — G56.23 ULNAR NEUROPATHY OF BOTH UPPER EXTREMITIES: ICD-10-CM

## 2022-11-21 PROCEDURE — 99214 OFFICE O/P EST MOD 30 MIN: CPT | Performed by: NEUROLOGICAL SURGERY

## 2022-12-06 ENCOUNTER — OFFICE VISIT (OUTPATIENT)
Dept: INTERNAL MEDICINE | Facility: CLINIC | Age: 62
End: 2022-12-06

## 2022-12-06 VITALS
HEIGHT: 63 IN | SYSTOLIC BLOOD PRESSURE: 110 MMHG | DIASTOLIC BLOOD PRESSURE: 64 MMHG | OXYGEN SATURATION: 98 % | HEART RATE: 74 BPM | BODY MASS INDEX: 18.78 KG/M2 | WEIGHT: 106 LBS

## 2022-12-06 DIAGNOSIS — H92.01 RIGHT EAR PAIN: Primary | ICD-10-CM

## 2022-12-06 PROBLEM — E55.9 VITAMIN D DEFICIENCY: Status: ACTIVE | Noted: 2022-03-30

## 2022-12-06 PROCEDURE — 99213 OFFICE O/P EST LOW 20 MIN: CPT | Performed by: INTERNAL MEDICINE

## 2022-12-06 NOTE — PROGRESS NOTES
Devonte Becker is a 62 y.o. female who presents with   Chief Complaint   Patient presents with   • Earache       History of Present Illness     The following data was reviewed by: Rachael Oconnell MD on 12/06/2022:    Data reviewed: MRI c-spine, EMG/NCS         Right ear pain for two weeks.  Starting after putting earplugs in.  No drainage.  No fever.      We discussed MRI, c-spine results because of ongoing issue with RUBI that she is seeing hand surgery and neurosurgery for.      Review of Systems   HENT: Positive for ear pain and hearing loss. Negative for ear discharge, rhinorrhea and sore throat.    Respiratory: Negative for cough.    Gastrointestinal: Negative for abdominal pain, diarrhea and vomiting.   Musculoskeletal: Negative for neck pain.   Skin: Negative for rash.   Neurological: Negative for headaches.       The following portions of the patient's history were reviewed and updated as appropriate: allergies, current medications and problem list.    Patient Active Problem List    Diagnosis Date Noted   • Cervical disc disorder at C5-C6 level with radiculopathy 08/22/2022   • Vitamin D deficiency 03/30/2022   • Displacement of cervical intervertebral disc without myelopathy 10/25/2021   • Cubital tunnel syndrome 09/08/2020   • Ulnar neuropathy of both upper extremities 08/17/2020   • Carpal tunnel syndrome of right wrist 08/17/2020   • Cervical spondylosis 01/21/2020   • Degeneration of lumbar intervertebral disc 01/21/2020   • Acquired hypothyroidism 02/08/2019   • Hyperacusis, bilateral 07/16/2018   • Pelvic floor dysfunction 03/24/2016   • Atopic rhinitis 03/23/2016   • Chronic neck pain 03/23/2016   • Chronic interstitial cystitis 03/23/2016     Note Last Updated: 3/23/2016     Description: Extreme case followed by urologist at  as well as Dr. Mendez Franz.     • Degeneration of cervical intervertebral disc 03/23/2016   • Hyperlipidemia 03/23/2016     Note Last Updated: 1/28/2020     " Description: 1.9% 10 year risk of ASCVD in 12/8/14; 1.6% on 3/23/16, 1.5% 10 year risk 3/28/2017;  2.1% 10 year risk.  2.8% 10 year risk o 1/28/2020.      • Osteoporosis 03/23/2016     Note Last Updated: 1/25/2019     Start in 4/2017     • Tinnitus 02/01/2016   • Osteoarthritis (arthritis due to wear and tear of joints) 03/12/2012       Current Outpatient Medications on File Prior to Visit   Medication Sig Dispense Refill   • baclofen (LIORESAL) 10 MG tablet Take by mouth 3 (three) times a day as needed. 1 to 2 tablets     • clobetasol (TEMOVATE) 0.05 % ointment clobetasol 0.05 % topical ointment   APPLY THIN LAYER THREE TIMES DAILY FOR 3 WEEKS THEN ONCE DAILY UNTIL SKIN LOOKS AND FEELS NORMAL THEN USE TWICE WEEKLY     • Cod Liver Oil 10 MINIM capsule Take by mouth.     • diazepam (VALIUM) 5 MG tablet Take 1 tablet by mouth as needed.     • Estriol powder      • Hormone Cream Base cream      • Ivermectin powder      • lidocaine (LIDODERM) 5 % Apply 1 patch topically as needed.     • nortriptyline (PAMELOR) 10 MG capsule Take 10 mg by mouth Every Night.     • Omega-3 Fatty Acids (FISH OIL PO) Take by mouth.     • oxyCODONE (ROXICODONE) 5 MG immediate release tablet Take 1 to 2 tablets every 4 to 6 hours as needed for pain     • PREBIOTIC PRODUCT PO Take  by mouth.     • Probiotic Product (PROBIOTIC ADVANCED PO) Take  by mouth.     • Progesterone Micronized (PROGESTERONE, BULK,) powder Place on the skin.       • promethazine (PHENERGAN) 25 MG tablet   2   • traMADol (ULTRAM) 50 MG tablet Take 1 tablet by mouth 2 (Two) Times a Day As Needed.       No current facility-administered medications on file prior to visit.       Objective     /64   Pulse 74   Ht 160 cm (62.99\")   Wt 48.1 kg (106 lb)   SpO2 98%   BMI 18.78 kg/m²     Physical Exam  Constitutional:       Appearance: She is well-developed.   HENT:      Head: Normocephalic and atraumatic.      Right Ear: Tympanic membrane is not injected.      Left Ear: " Tympanic membrane normal. Tympanic membrane is not injected.   Pulmonary:      Effort: Pulmonary effort is normal.   Neurological:      Mental Status: She is alert and oriented to person, place, and time.   Psychiatric:         Behavior: Behavior normal.         Assessment & Plan   Diagnoses and all orders for this visit:    1. Right ear pain (Primary)        Discussion    Patient presents with persistent right ear pain.  Ear drum is intact.  No abnormality of the EAC.  She is advised to see her ENT for further evaluation.     Left hand numbness.  She is encouraged to f/u with her hand doctor as planned.       Future Appointments   Date Time Provider Department Center   2/8/2023 10:00 AM [facility]   2/14/2023 10:00 AM Rachael Oconnell MD [facility]   3/22/2023  2:00 PM Wang De La Vega MD [facility]

## 2023-03-14 DIAGNOSIS — Z00.00 HEALTH MAINTENANCE EXAMINATION: Primary | ICD-10-CM

## 2023-03-14 DIAGNOSIS — E55.9 VITAMIN D DEFICIENCY: ICD-10-CM

## 2023-03-14 LAB
25(OH)D3+25(OH)D2 SERPL-MCNC: 43.1 NG/ML (ref 30–100)
ALBUMIN SERPL-MCNC: 4.5 G/DL (ref 3.5–5.2)
ALBUMIN/GLOB SERPL: 1.9 G/DL
ALP SERPL-CCNC: 81 U/L (ref 39–117)
ALT SERPL-CCNC: 34 U/L (ref 1–33)
APPEARANCE UR: CLEAR
AST SERPL-CCNC: 25 U/L (ref 1–32)
BACTERIA #/AREA URNS HPF: ABNORMAL /HPF
BASOPHILS # BLD AUTO: 0.05 10*3/MM3 (ref 0–0.2)
BASOPHILS NFR BLD AUTO: 1.1 % (ref 0–1.5)
BILIRUB SERPL-MCNC: 0.4 MG/DL (ref 0–1.2)
BILIRUB UR QL STRIP: NEGATIVE
BUN SERPL-MCNC: 18 MG/DL (ref 8–23)
BUN/CREAT SERPL: 15 (ref 7–25)
CALCIUM SERPL-MCNC: 9.9 MG/DL (ref 8.6–10.5)
CASTS URNS MICRO: ABNORMAL
CHLORIDE SERPL-SCNC: 103 MMOL/L (ref 98–107)
CHOLEST SERPL-MCNC: 287 MG/DL (ref 0–200)
CO2 SERPL-SCNC: 28.9 MMOL/L (ref 22–29)
COLOR UR: YELLOW
CREAT SERPL-MCNC: 1.2 MG/DL (ref 0.57–1)
EGFRCR SERPLBLD CKD-EPI 2021: 50.7 ML/MIN/1.73
EOSINOPHIL # BLD AUTO: 0.13 10*3/MM3 (ref 0–0.4)
EOSINOPHIL NFR BLD AUTO: 2.8 % (ref 0.3–6.2)
EPI CELLS #/AREA URNS HPF: ABNORMAL /HPF
ERYTHROCYTE [DISTWIDTH] IN BLOOD BY AUTOMATED COUNT: 12.1 % (ref 12.3–15.4)
GLOBULIN SER CALC-MCNC: 2.4 GM/DL
GLUCOSE SERPL-MCNC: 95 MG/DL (ref 65–99)
GLUCOSE UR QL STRIP: NEGATIVE
HCT VFR BLD AUTO: 41.3 % (ref 34–46.6)
HDLC SERPL-MCNC: 104 MG/DL (ref 40–60)
HGB BLD-MCNC: 13.7 G/DL (ref 12–15.9)
HGB UR QL STRIP: ABNORMAL
IMM GRANULOCYTES # BLD AUTO: 0.01 10*3/MM3 (ref 0–0.05)
IMM GRANULOCYTES NFR BLD AUTO: 0.2 % (ref 0–0.5)
KETONES UR QL STRIP: NEGATIVE
LDLC SERPL CALC-MCNC: 173 MG/DL (ref 0–100)
LEUKOCYTE ESTERASE UR QL STRIP: ABNORMAL
LYMPHOCYTES # BLD AUTO: 1.45 10*3/MM3 (ref 0.7–3.1)
LYMPHOCYTES NFR BLD AUTO: 31.4 % (ref 19.6–45.3)
MCH RBC QN AUTO: 29.3 PG (ref 26.6–33)
MCHC RBC AUTO-ENTMCNC: 33.2 G/DL (ref 31.5–35.7)
MCV RBC AUTO: 88.2 FL (ref 79–97)
MONOCYTES # BLD AUTO: 0.5 10*3/MM3 (ref 0.1–0.9)
MONOCYTES NFR BLD AUTO: 10.8 % (ref 5–12)
NEUTROPHILS # BLD AUTO: 2.48 10*3/MM3 (ref 1.7–7)
NEUTROPHILS NFR BLD AUTO: 53.7 % (ref 42.7–76)
NITRITE UR QL STRIP: NEGATIVE
NRBC BLD AUTO-RTO: 0 /100 WBC (ref 0–0.2)
PH UR STRIP: 6.5 [PH] (ref 5–8)
PLATELET # BLD AUTO: 236 10*3/MM3 (ref 140–450)
POTASSIUM SERPL-SCNC: 4 MMOL/L (ref 3.5–5.2)
PROT SERPL-MCNC: 6.9 G/DL (ref 6–8.5)
PROT UR QL STRIP: NEGATIVE
RBC # BLD AUTO: 4.68 10*6/MM3 (ref 3.77–5.28)
RBC #/AREA URNS HPF: ABNORMAL /HPF
SODIUM SERPL-SCNC: 142 MMOL/L (ref 136–145)
SP GR UR STRIP: 1.02 (ref 1–1.03)
T4 FREE SERPL-MCNC: 1.11 NG/DL (ref 0.93–1.7)
TRIGL SERPL-MCNC: 68 MG/DL (ref 0–150)
TSH SERPL DL<=0.005 MIU/L-ACNC: 6.18 UIU/ML (ref 0.27–4.2)
UROBILINOGEN UR STRIP-MCNC: ABNORMAL MG/DL
VLDLC SERPL CALC-MCNC: 10 MG/DL (ref 5–40)
WBC # BLD AUTO: 4.62 10*3/MM3 (ref 3.4–10.8)
WBC #/AREA URNS HPF: ABNORMAL /HPF

## 2023-03-21 ENCOUNTER — OFFICE VISIT (OUTPATIENT)
Dept: INTERNAL MEDICINE | Facility: CLINIC | Age: 63
End: 2023-03-21
Payer: MEDICARE

## 2023-03-21 VITALS
DIASTOLIC BLOOD PRESSURE: 72 MMHG | HEIGHT: 63 IN | SYSTOLIC BLOOD PRESSURE: 120 MMHG | HEART RATE: 74 BPM | OXYGEN SATURATION: 98 % | BODY MASS INDEX: 19.31 KG/M2 | WEIGHT: 109 LBS

## 2023-03-21 DIAGNOSIS — E78.5 HYPERLIPIDEMIA, UNSPECIFIED HYPERLIPIDEMIA TYPE: ICD-10-CM

## 2023-03-21 DIAGNOSIS — M81.0 OSTEOPOROSIS, UNSPECIFIED OSTEOPOROSIS TYPE, UNSPECIFIED PATHOLOGICAL FRACTURE PRESENCE: ICD-10-CM

## 2023-03-21 DIAGNOSIS — R82.90 ABNORMAL URINE: ICD-10-CM

## 2023-03-21 DIAGNOSIS — E03.9 ACQUIRED HYPOTHYROIDISM: ICD-10-CM

## 2023-03-21 DIAGNOSIS — Z00.00 MEDICARE ANNUAL WELLNESS VISIT, SUBSEQUENT: Primary | ICD-10-CM

## 2023-03-21 RX ORDER — LEVOTHYROXINE SODIUM 0.03 MG/1
50 TABLET ORAL DAILY
Qty: 90 TABLET | Refills: 3 | Status: SHIPPED | OUTPATIENT
Start: 2023-03-21 | End: 2023-03-27 | Stop reason: SDUPTHER

## 2023-03-21 RX ORDER — ROSUVASTATIN CALCIUM 10 MG/1
10 TABLET, COATED ORAL DAILY
Qty: 90 TABLET | Refills: 3 | Status: SHIPPED | OUTPATIENT
Start: 2023-03-21

## 2023-03-21 NOTE — PROGRESS NOTES
The ABCs of the Annual Wellness Visit  Subsequent Medicare Wellness Visit    Devonte Becker is a 62 y.o. female who presents for a Subsequent Medicare Wellness Visit.    The following portions of the patient's history were reviewed and   updated as appropriate: allergies, current medications, past family history, past medical history, past social history, past surgical history and problem list.    Compared to one year ago, the patient feels her physical   health is the same.    Compared to one year ago, the patient feels her mental   health is the same.    Recent Hospitalizations:  She was not admitted to the hospital during the last year.       Current Medical Providers:  Patient Care Team:  Rachael Oconnell MD as PCP - General (Internal Medicine)  Maci Jennings MD as Consulting Physician (Obstetrics and Gynecology)    Outpatient Medications Prior to Visit   Medication Sig Dispense Refill   • baclofen (LIORESAL) 10 MG tablet Take by mouth 3 (three) times a day as needed. 1 to 2 tablets     • clobetasol (TEMOVATE) 0.05 % ointment clobetasol 0.05 % topical ointment   APPLY THIN LAYER THREE TIMES DAILY FOR 3 WEEKS THEN ONCE DAILY UNTIL SKIN LOOKS AND FEELS NORMAL THEN USE TWICE WEEKLY     • diazepam (VALIUM) 5 MG tablet Take 1 tablet by mouth As Needed.     • Estriol powder      • Hormone Cream Base cream      • Ivermectin powder      • lidocaine (LIDODERM) 5 % Apply 1 patch topically to the appropriate area as directed As Needed.     • nortriptyline (PAMELOR) 10 MG capsule Take 1 capsule by mouth Every Night.     • Omega-3 Fatty Acids (FISH OIL PO) Take by mouth.     • oxyCODONE (ROXICODONE) 5 MG immediate release tablet Take 1 to 2 tablets every 4 to 6 hours as needed for pain     • PREBIOTIC PRODUCT PO Take  by mouth.     • Probiotic Product (PROBIOTIC ADVANCED PO) Take  by mouth.     • Progesterone Micronized (PROGESTERONE, BULK,) powder Place on the skin.       • promethazine (PHENERGAN) 25 MG  "tablet   2   • traMADol (ULTRAM) 50 MG tablet Take 1 tablet by mouth 2 (Two) Times a Day As Needed.     • Cod Liver Oil 10 MINIM capsule Take by mouth. (Patient not taking: Reported on 3/21/2023)       No facility-administered medications prior to visit.       Opioid medication/s are on active medication list.  and I have evaluated her active treatment plan and pain score trends (see table).  Vitals:    03/21/23 1006   PainSc: 0-No pain     I have reviewed the chart for potential of high risk medication and harmful drug interactions in the elderly.            Aspirin is not on active medication list.  Aspirin use is not indicated based on review of current medical condition/s. Risk of harm outweighs potential benefits.  .    Patient Active Problem List   Diagnosis   • Tinnitus   • Atopic rhinitis   • Chronic neck pain   • Chronic interstitial cystitis   • Degeneration of cervical intervertebral disc   • Hyperlipidemia   • Osteoporosis   • Pelvic floor dysfunction   • Acquired hypothyroidism   • Ulnar neuropathy of both upper extremities   • Carpal tunnel syndrome of right wrist   • Cervical spondylosis   • Cubital tunnel syndrome   • Hyperacusis, bilateral   • Osteoarthritis (arthritis due to wear and tear of joints)   • Degeneration of lumbar intervertebral disc   • Displacement of cervical intervertebral disc without myelopathy   • Cervical disc disorder at C5-C6 level with radiculopathy   • Vitamin D deficiency          Objective    Vitals:    03/21/23 1006   BP: 120/72   Pulse: 74   SpO2: 98%   Weight: 49.4 kg (109 lb)   Height: 160 cm (62.99\")   PainSc: 0-No pain     Estimated body mass index is 19.31 kg/m² as calculated from the following:    Height as of this encounter: 160 cm (62.99\").    Weight as of this encounter: 49.4 kg (109 lb).    BMI is within normal parameters. No other follow-up for BMI required.      Does the patient have evidence of cognitive impairment? No    Lab Results   Component Value Date "    CHLPL 287 (H) 2023    TRIG 68 2023     (H) 2023     (H) 2023    VLDL 10 2023        HEALTH RISK ASSESSMENT    Smoking Status:  Social History     Tobacco Use   Smoking Status Former   • Packs/day: 0.25   • Years: 2.00   • Pack years: 0.50   • Types: Cigarettes   • Start date: 1991   • Quit date: 1993   • Years since quittin.2   Smokeless Tobacco Never     Alcohol Consumption:  Social History     Substance and Sexual Activity   Alcohol Use No     Fall Risk Screen:    EDSONADI Fall Risk Assessment was completed, and patient is at LOW risk for falls.Assessment completed on:3/21/2023    Depression Screening:  PHQ-2/PHQ-9 Depression Screening 3/21/2023   Little Interest or Pleasure in Doing Things 0-->not at all   Feeling Down, Depressed or Hopeless 0-->not at all   PHQ-9: Brief Depression Severity Measure Score 0       Health Habits and Functional and Cognitive Screening:  Functional & Cognitive Status 3/21/2023   Do you have difficulty preparing food and eating? No   Do you have difficulty bathing yourself, getting dressed or grooming yourself? No   Do you have difficulty using the toilet? No   Do you have difficulty moving around from place to place? No   Do you have trouble with steps or getting out of a bed or a chair? No   Current Diet Well Balanced Diet   Dental Exam Up to date   Eye Exam Up to date   Exercise (times per week) 0 times per week   Current Exercises Include No Regular Exercise   Current Exercise Activities Include -   Do you need help using the phone?  No   Are you deaf or do you have serious difficulty hearing?  No   Do you need help with transportation? No   Do you need help shopping? No   Do you need help preparing meals?  No   Do you need help with housework?  No   Do you need help with laundry? No   Do you need help taking your medications? No   Do you need help managing money? No   Do you ever drive or ride in a car without wearing a  seat belt? No   Have you felt unusual stress, anger or loneliness in the last month? No   Who do you live with? Spouse   If you need help, do you have trouble finding someone available to you? No   Have you been bothered in the last four weeks by sexual problems? No   Do you have difficulty concentrating, remembering or making decisions? No       Age-appropriate Screening Schedule:  Refer to the list below for future screening recommendations based on patient's age, sex and/or medical conditions. Orders for these recommended tests are listed in the plan section. The patient has been provided with a written plan.    Health Maintenance   Topic Date Due   • ANNUAL WELLNESS VISIT  02/08/2023   • DXA SCAN  05/19/2023   • MAMMOGRAM  02/09/2024   • PAP SMEAR  03/01/2024   • LIPID PANEL  03/14/2024   • COLORECTAL CANCER SCREENING  09/10/2024   • TDAP/TD VACCINES (4 - Td or Tdap) 04/24/2027   • HEPATITIS C SCREENING  Completed   • COVID-19 Vaccine  Completed   • Pneumococcal Vaccine 0-64  Aged Out   • INFLUENZA VACCINE  Discontinued                CMS Preventative Services Quick Reference  Risk Factors Identified During Encounter  None identified.    The above risks/problems have been discussed with the patient.  Pertinent information has been shared with the patient in the After Visit Summary.  An After Visit Summary and PPPS were made available to the patient.    Follow Up:   Next Medicare Wellness visit to be scheduled in 1 year.       Additional E&M Note during same encounter follows:  Patient has multiple medical problems which are significant and separately identifiable that require additional work above and beyond the Medicare Wellness Visit.      Chief Complaint  Medicare Wellness-subsequent, Hypothyroidism, and Hyperlipidemia    Subjective        HPI  Leonie R Fears is also being seen today for HLD, hypothyroidism       HLD.  I reviewed cholesterol labs with the patient.  I think the patient needs to consider a  "statin medication.  I would do blood work every 3 months initially to monitor liver enzymes.  Side effects could include muscle aches which the patient should let me know if they have.     Hypothyroidism.  She is not currently on medication.  She is willing to try.  She is cold intolerant.     Review of Systems   Respiratory: Negative.    Cardiovascular: Negative.        Objective   Vital Signs:  /72   Pulse 74   Ht 160 cm (62.99\")   Wt 49.4 kg (109 lb)   SpO2 98%   BMI 19.31 kg/m²     Physical Exam  Constitutional:       Appearance: She is well-developed.   HENT:      Head: Normocephalic and atraumatic.      Right Ear: Hearing, tympanic membrane and external ear normal.      Left Ear: Hearing, tympanic membrane and external ear normal.      Nose: Nose normal.   Neck:      Thyroid: No thyromegaly.   Cardiovascular:      Rate and Rhythm: Normal rate and regular rhythm.      Heart sounds: Normal heart sounds. No murmur heard.  Pulmonary:      Effort: Pulmonary effort is normal.      Breath sounds: Normal breath sounds.   Abdominal:      General: There is no distension.      Palpations: Abdomen is soft.      Tenderness: There is no abdominal tenderness.   Musculoskeletal:      Cervical back: Neck supple.   Lymphadenopathy:      Cervical: No cervical adenopathy.   Skin:     General: Skin is warm and dry.   Neurological:      Mental Status: She is alert and oriented to person, place, and time.   Psychiatric:         Speech: Speech normal.         Behavior: Behavior normal.         Thought Content: Thought content normal.         Judgment: Judgment normal.          The following data was reviewed by: Rachael Oconnell MD on 03/21/2023:  Common labs    Common Labs 3/14/23 3/14/23 3/14/23    0948 0948 0948   Glucose  95    BUN  18    Creatinine  1.20 (A)    Sodium  142    Potassium  4.0    Chloride  103    Calcium  9.9    Total Protein  6.9    Albumin  4.5    Total Bilirubin  0.4    Alkaline Phosphatase  81  "   AST (SGOT)  25    ALT (SGPT)  34 (A)    WBC 4.62     Hemoglobin 13.7     Hematocrit 41.3     Platelets 236     Total Cholesterol   287 (A)   Triglycerides   68   HDL Cholesterol   104 (A)   LDL Cholesterol    173 (A)   (A) Abnormal value       Comments are available for some flowsheets but are not being displayed.                      Assessment and Plan   Diagnoses and all orders for this visit:    1. Medicare annual wellness visit, subsequent (Primary)    2. Hyperlipidemia, unspecified hyperlipidemia type    3. Acquired hypothyroidism    4. Osteoporosis, unspecified osteoporosis type, unspecified pathological fracture presence  -     DEXA Bone Density Axial; Future    5. Abnormal urine  -     UA / M With / Rflx Culture([facility] ONLY) - Urine, Clean Catch    Other orders  -     rosuvastatin (Crestor) 10 MG tablet; Take 1 tablet by mouth Daily.  Dispense: 90 tablet; Refill: 3  -     levothyroxine (Synthroid) 25 MCG tablet; Take 2 tablets by mouth Daily.  Dispense: 90 tablet; Refill: 3      HLD.  Start Crestor.  Discussed with the patient onset on action and the potential side effects including myalgias.  The patient will let me know of any side effects from the medication.      Hypothyroidism.  Start levothyroxine.    OP.  I recommend to get 1200 mg of calcium and 1000 IUs of vitamin D through diet and supplements and to participate in a weight based exercise to prevent loss of bone mineral density. Schedule DEXA.    Abnormal urine.  Recheck send out urine.           Follow Up   Return in about 3 months (around 6/21/2023) for Recheck.  Patient was given instructions and counseling regarding her condition or for health maintenance advice. Please see specific information pulled into the AVS if appropriate.

## 2023-03-21 NOTE — PATIENT INSTRUCTIONS
Medicare Wellness  Personal Prevention Plan of Service     Date of Office Visit:    Encounter Provider:  Rachael Oconnell MD  Place of Service:  [facility]  Patient Name: Leonie Becker  :  1960    As part of the Medicare Wellness portion of your visit today, we are providing you with this personalized preventive plan of services (PPPS). This plan is based upon recommendations of the [place] Preventive Services Task Force (USPSTF) and the Advisory Committee on Immunization Practices (ACIP).    This lists the preventive care services that should be considered, and provides dates of when you are due. Items listed as completed are up-to-date and do not require any further intervention.    Health Maintenance   Topic Date Due    ANNUAL WELLNESS VISIT  2023    DXA SCAN  2023    MAMMOGRAM  2024    PAP SMEAR  2024    LIPID PANEL  2024    COLORECTAL CANCER SCREENING  09/10/2024    TDAP/TD VACCINES (4 - Td or Tdap) 2027    HEPATITIS C SCREENING  Completed    COVID-19 Vaccine  Completed    Pneumococcal Vaccine 0-64  Aged Out    INFLUENZA VACCINE  Discontinued       Orders Placed This Encounter   Procedures    DEXA Bone Density Axial     Standing Status:   Future     Order Specific Question:   Is patient taking or have taken long term Glucocorticoid (steroids)?     Answer:   No     Order Specific Question:   Does the patient have rheumatoid arthritis?     Answer:   No     Order Specific Question:   Reason for Exam:     Answer:   op    UA / M With / Rflx Culture([facility] ONLY) - Urine, Clean Catch     Order Specific Question:   Release to patient     Answer:   Routine Release       Return in about 3 months (around 2023) for Recheck.

## 2023-03-22 ENCOUNTER — OFFICE VISIT (OUTPATIENT)
Dept: NEUROSURGERY | Facility: CLINIC | Age: 63
End: 2023-03-22
Payer: MEDICARE

## 2023-03-22 VITALS
SYSTOLIC BLOOD PRESSURE: 110 MMHG | HEART RATE: 80 BPM | DIASTOLIC BLOOD PRESSURE: 70 MMHG | OXYGEN SATURATION: 98 % | TEMPERATURE: 97.1 F

## 2023-03-22 DIAGNOSIS — G56.23 ULNAR NEUROPATHY OF BOTH UPPER EXTREMITIES: ICD-10-CM

## 2023-03-22 DIAGNOSIS — G56.01 CARPAL TUNNEL SYNDROME OF RIGHT WRIST: ICD-10-CM

## 2023-03-22 DIAGNOSIS — M50.122 CERVICAL DISC DISORDER AT C5-C6 LEVEL WITH RADICULOPATHY: ICD-10-CM

## 2023-03-22 DIAGNOSIS — M54.42 CHRONIC MIDLINE LOW BACK PAIN WITH BILATERAL SCIATICA: ICD-10-CM

## 2023-03-22 DIAGNOSIS — M51.36 DDD (DEGENERATIVE DISC DISEASE), LUMBAR: ICD-10-CM

## 2023-03-22 DIAGNOSIS — G89.29 CHRONIC NECK PAIN: Primary | ICD-10-CM

## 2023-03-22 DIAGNOSIS — M54.41 CHRONIC MIDLINE LOW BACK PAIN WITH BILATERAL SCIATICA: ICD-10-CM

## 2023-03-22 DIAGNOSIS — M54.2 CHRONIC NECK PAIN: Primary | ICD-10-CM

## 2023-03-22 DIAGNOSIS — G89.29 CHRONIC MIDLINE LOW BACK PAIN WITH BILATERAL SCIATICA: ICD-10-CM

## 2023-03-22 PROCEDURE — 99214 OFFICE O/P EST MOD 30 MIN: CPT | Performed by: NEUROLOGICAL SURGERY

## 2023-03-22 PROCEDURE — 1159F MED LIST DOCD IN RCRD: CPT | Performed by: NEUROLOGICAL SURGERY

## 2023-03-22 PROCEDURE — 1160F RVW MEDS BY RX/DR IN RCRD: CPT | Performed by: NEUROLOGICAL SURGERY

## 2023-03-23 ENCOUNTER — TELEPHONE (OUTPATIENT)
Dept: INTERNAL MEDICINE | Facility: CLINIC | Age: 63
End: 2023-03-23
Payer: MEDICARE

## 2023-03-23 NOTE — TELEPHONE ENCOUNTER
Caller: Leonie Becker     Relationship: [unfilled]     Best call back number: [phone]    What is your medical concern? WOULD LIKE TO KNOW MORE ABOUT THIS. UNSURE ABOUT PRESCRIPTION FOR levothyroxine (Synthroid) 25 MCG tablet.

## 2023-03-24 NOTE — TELEPHONE ENCOUNTER
PATIENT STATED SHE SPOKE WITH A NURSE YESTERDAY, AND HAS SOME ADDITIONAL QUESTIONS.    PLEASE CALL.    [phone]

## 2023-03-27 RX ORDER — LEVOTHYROXINE SODIUM 0.03 MG/1
25 TABLET ORAL DAILY
Qty: 90 TABLET | Refills: 3 | COMMUNITY
Start: 2023-03-27

## 2023-03-28 ENCOUNTER — HOSPITAL ENCOUNTER (OUTPATIENT)
Dept: GENERAL RADIOLOGY | Facility: HOSPITAL | Age: 63
Discharge: HOME | End: 2023-03-28
Admitting: NEUROLOGICAL SURGERY
Payer: MEDICARE

## 2023-03-28 DIAGNOSIS — M54.42 CHRONIC MIDLINE LOW BACK PAIN WITH BILATERAL SCIATICA: ICD-10-CM

## 2023-03-28 DIAGNOSIS — M54.41 CHRONIC MIDLINE LOW BACK PAIN WITH BILATERAL SCIATICA: ICD-10-CM

## 2023-03-28 DIAGNOSIS — G89.29 CHRONIC MIDLINE LOW BACK PAIN WITH BILATERAL SCIATICA: ICD-10-CM

## 2023-03-28 DIAGNOSIS — M51.36 DDD (DEGENERATIVE DISC DISEASE), LUMBAR: ICD-10-CM

## 2023-03-28 PROCEDURE — 72114 X-RAY EXAM L-S SPINE BENDING: CPT

## 2023-03-30 NOTE — PROGRESS NOTES
Subjective   Patient ID: Leonie Becker is a 62 y.o. female is here today for follow-up.    You have chosen to receive care through a telephone visit. Do you consent to use a telephone visit for your medical care today? Yes    Unable to complete visit using a video connection to the patient. A phone visit was used to complete this visits. Total time of discussion was 11 minutes.    I was calling from the hospital.  She was at home.  We discussed the x-rays which did not show any significant deformity.  She is going to see Dr. Ruiz about her elbow.  We will set up some physical therapy which she would like to do at [facility] which has its own physical therapy facility.  She already has a visit in September and we will have her keep that.      History of Present Illness    The following portions of the patient's history were reviewed and updated as appropriate: allergies, current medications, past family history, past medical history, past social history, past surgical history and problem list.    Review of Systems        Objective     There were no vitals filed for this visit.  There is no height or weight on file to calculate BMI.    Tobacco Use: Medium Risk   • Smoking Tobacco Use: Former   • Smokeless Tobacco Use: Never   • Passive Exposure: Not on file          Physical Exam  Neurologic Exam        Assessment & Plan   Independent Review of Radiographic Studies:      I personally reviewed the images from the following studies.    The x-rays done on 3/28/2023 do not show any spondylolisthesis or significant disc base collapse.  Rather unremarkable.  Agree with the report.    We will go ahead and set up some outpatient therapy at [facility] and have her come back in for her scheduled visit in September.    Medical Decision Making:      We will go ahead and set up some outpatient therapy at [facility] and have her come back in for her scheduled visit in September    Diagnoses and all orders  for this visit:    1. Chronic neck pain (Primary)    2. Cervical disc disorder at C5-C6 level with radiculopathy    3. Ulnar neuropathy of both upper extremities    4. Carpal tunnel syndrome of right wrist    5. Chronic midline low back pain with bilateral sciatica  -     Ambulatory Referral to Physical Therapy Evaluate and treat    6. DDD (degenerative disc disease), lumbar  -     Ambulatory Referral to Physical Therapy Evaluate and treat      Return in about 5 months (around 9/25/2023) for Already has a face-to-face visit on this day, keep.

## 2023-04-13 ENCOUNTER — OFFICE VISIT (OUTPATIENT)
Dept: NEUROSURGERY | Facility: CLINIC | Age: 63
End: 2023-04-13
Payer: MEDICARE

## 2023-04-13 DIAGNOSIS — M54.42 CHRONIC MIDLINE LOW BACK PAIN WITH BILATERAL SCIATICA: ICD-10-CM

## 2023-04-13 DIAGNOSIS — M54.41 CHRONIC MIDLINE LOW BACK PAIN WITH BILATERAL SCIATICA: ICD-10-CM

## 2023-04-13 DIAGNOSIS — G56.01 CARPAL TUNNEL SYNDROME OF RIGHT WRIST: ICD-10-CM

## 2023-04-13 DIAGNOSIS — G56.23 ULNAR NEUROPATHY OF BOTH UPPER EXTREMITIES: ICD-10-CM

## 2023-04-13 DIAGNOSIS — M54.2 CHRONIC NECK PAIN: Primary | ICD-10-CM

## 2023-04-13 DIAGNOSIS — G89.29 CHRONIC NECK PAIN: Primary | ICD-10-CM

## 2023-04-13 DIAGNOSIS — M50.122 CERVICAL DISC DISORDER AT C5-C6 LEVEL WITH RADICULOPATHY: ICD-10-CM

## 2023-04-13 DIAGNOSIS — M51.36 DDD (DEGENERATIVE DISC DISEASE), LUMBAR: ICD-10-CM

## 2023-04-13 DIAGNOSIS — G89.29 CHRONIC MIDLINE LOW BACK PAIN WITH BILATERAL SCIATICA: ICD-10-CM

## 2023-04-13 PROCEDURE — 1160F RVW MEDS BY RX/DR IN RCRD: CPT | Performed by: NEUROLOGICAL SURGERY

## 2023-04-13 PROCEDURE — 1159F MED LIST DOCD IN RCRD: CPT | Performed by: NEUROLOGICAL SURGERY

## 2023-04-13 PROCEDURE — 99442 PR PHYS/QHP TELEPHONE EVALUATION 11-20 MIN: CPT | Performed by: NEUROLOGICAL SURGERY

## 2023-04-19 ENCOUNTER — TELEPHONE (OUTPATIENT)
Dept: INTERNAL MEDICINE | Facility: CLINIC | Age: 63
End: 2023-04-19
Payer: MEDICARE

## 2023-04-19 NOTE — TELEPHONE ENCOUNTER
Caller: Leonie Becker    Relationship: Self    Best call back number: [phone]    What medications are you currently taking:   Current Outpatient Medications on File Prior to Visit   Medication Sig Dispense Refill   • baclofen (LIORESAL) 10 MG tablet Take by mouth 3 (three) times a day as needed. 1 to 2 tablets     • clobetasol (TEMOVATE) 0.05 % ointment clobetasol 0.05 % topical ointment   APPLY THIN LAYER THREE TIMES DAILY FOR 3 WEEKS THEN ONCE DAILY UNTIL SKIN LOOKS AND FEELS NORMAL THEN USE TWICE WEEKLY     • diazepam (VALIUM) 5 MG tablet Take 1 tablet by mouth As Needed.     • Estriol powder      • Hormone Cream Base cream      • Ivermectin powder      • levothyroxine (Synthroid) 25 MCG tablet Take 1 tablet by mouth Daily. 90 tablet 3   • lidocaine (LIDODERM) 5 % Apply 1 patch topically to the appropriate area as directed As Needed.     • nortriptyline (PAMELOR) 10 MG capsule Take 1 capsule by mouth Every Night.     • Omega-3 Fatty Acids (FISH OIL PO) Take by mouth.     • oxyCODONE (ROXICODONE) 5 MG immediate release tablet Take 1 to 2 tablets every 4 to 6 hours as needed for pain     • PREBIOTIC PRODUCT PO Take  by mouth.     • Probiotic Product (PROBIOTIC ADVANCED PO) Take  by mouth.     • Progesterone Micronized (PROGESTERONE, BULK,) powder Place on the skin.       • promethazine (PHENERGAN) 25 MG tablet   2   • rosuvastatin (Crestor) 10 MG tablet Take 1 tablet by mouth Daily. (Patient not taking: Reported on 3/22/2023) 90 tablet 3   • traMADol (ULTRAM) 50 MG tablet Take 1 tablet by mouth 2 (Two) Times a Day As Needed.       No current facility-administered medications on file prior to visit.          What are your concerns:  PATIENT STATES THAT SHE HAS BEEN TAKING THIS MEDICATION FOR THREE WEEKS. SHE STATES THAT SHE STARTED TAKING HALF THE MEDICATION  ON 03/24. SHE HAS OTHER ACHES AND PAINS THAT BOTHERED HER. SHE STATES THAT THIS PAIN HAS BUILT UP OVER TIME. SHE STATES ON 04/17 SHE STARTED HAVING  MUSCLE CRAMPS, HER WHOLE BODY WAS ACHING SEVERELY AND SHE WAS HAVING TROUBLE GOING TO SLEEP. SHE STATES THAT SHE COULD NOT GET COMFORTABLE NO MATTER HOW SHE MOVED. SHE STATES THAT SHE DID NOT TAKE THIS PILL LAST NIGHT AND SHE IS FEELING MUCH BETTER. PATIENT WOULD LIKE TO HAVE A CALL FROM DR. RUVALCABA TO DISCUSS THIS MEDICATION AS SOON AS POSSIBLE. SHE DID NOT KNOW IF SHE SHOULD STOP TAKING THE MEDICATION. PLEASE ADVISE.

## 2023-04-19 NOTE — TELEPHONE ENCOUNTER
Call and advise.  Hold the medication.  We could try a different statin when her muscle aches have resolved.  FLO

## 2023-05-02 ENCOUNTER — TELEPHONE (OUTPATIENT)
Dept: NEUROSURGERY | Facility: CLINIC | Age: 63
End: 2023-05-02

## 2023-05-02 NOTE — TELEPHONE ENCOUNTER
Caller: Leonie Becker    Relationship to patient: Self    Best call back number: [phone]    Patient is needing: PATIENT CALLED, PATIENT STATES [facility] HAS RECEIVED HER LAST OV NOTE BUT NOT THE REFERRAL FOR HER TO SCHEDULE. PLEASE FAX REFERRAL FOR PT TO [facility] AND CALL PATIENT TO ADVISE IT HAS BEE FAXED.    THANK YOU

## 2023-05-04 ENCOUNTER — TELEPHONE (OUTPATIENT)
Dept: NEUROSURGERY | Facility: CLINIC | Age: 63
End: 2023-05-04
Payer: MEDICARE

## 2023-06-08 ENCOUNTER — TRANSCRIBE ORDERS (OUTPATIENT)
Dept: WOMENS IMAGING | Facility: HOSPITAL | Age: 63
End: 2023-06-08
Payer: MEDICARE

## 2023-06-08 ENCOUNTER — HOSPITAL ENCOUNTER (OUTPATIENT)
Dept: PET IMAGING | Facility: HOSPITAL | Age: 63
Discharge: HOME | End: 2023-06-08
Payer: MEDICARE

## 2023-06-08 DIAGNOSIS — M81.0 HIGH RISK FOR FRACTURE DUE TO OSTEOPOROSIS BY DEXA SCAN: Primary | ICD-10-CM

## 2023-06-08 PROCEDURE — 77080 DXA BONE DENSITY AXIAL: CPT

## 2023-06-08 PROCEDURE — 77080 DXA BONE DENSITY AXIAL: CPT | Performed by: RADIOLOGY

## 2023-06-09 ENCOUNTER — TELEPHONE (OUTPATIENT)
Dept: INTERNAL MEDICINE | Facility: CLINIC | Age: 63
End: 2023-06-09
Payer: MEDICARE

## 2023-06-09 NOTE — TELEPHONE ENCOUNTER
Caller: Leonie Becker    Relationship: Self    Best call back number: [phone]     What was the call regarding: PATIENT WANTS TO ENSURE HER UPCOMING LABS 6/13/2023 CHECK HER CALCIUM. SHE WANTS TO MAKE SURE HER CALCIUM IS BEING ABSORBED.

## 2023-06-13 DIAGNOSIS — E78.5 HYPERLIPIDEMIA, UNSPECIFIED HYPERLIPIDEMIA TYPE: Primary | ICD-10-CM

## 2023-06-13 DIAGNOSIS — E03.9 ACQUIRED HYPOTHYROIDISM: ICD-10-CM

## 2023-06-13 LAB
ALBUMIN SERPL-MCNC: 4.3 G/DL (ref 3.5–5.2)
ALBUMIN/GLOB SERPL: 2 G/DL
ALP SERPL-CCNC: 68 U/L (ref 39–117)
ALT SERPL-CCNC: 23 U/L (ref 1–33)
AST SERPL-CCNC: 24 U/L (ref 1–32)
BASOPHILS # BLD AUTO: 0.06 10*3/MM3 (ref 0–0.2)
BASOPHILS NFR BLD AUTO: 1.4 % (ref 0–1.5)
BILIRUB SERPL-MCNC: <0.2 MG/DL (ref 0–1.2)
BUN SERPL-MCNC: 17 MG/DL (ref 8–23)
BUN/CREAT SERPL: 19.8 (ref 7–25)
CALCIUM SERPL-MCNC: 9.6 MG/DL (ref 8.6–10.5)
CHLORIDE SERPL-SCNC: 106 MMOL/L (ref 98–107)
CHOLEST SERPL-MCNC: 237 MG/DL (ref 0–200)
CO2 SERPL-SCNC: 27.6 MMOL/L (ref 22–29)
CREAT SERPL-MCNC: 0.86 MG/DL (ref 0.57–1)
EGFRCR SERPLBLD CKD-EPI 2021: 75.5 ML/MIN/1.73
EOSINOPHIL # BLD AUTO: 0.1 10*3/MM3 (ref 0–0.4)
EOSINOPHIL NFR BLD AUTO: 2.4 % (ref 0.3–6.2)
ERYTHROCYTE [DISTWIDTH] IN BLOOD BY AUTOMATED COUNT: 12.6 % (ref 12.3–15.4)
GLOBULIN SER CALC-MCNC: 2.2 GM/DL
GLUCOSE SERPL-MCNC: 89 MG/DL (ref 65–99)
HCT VFR BLD AUTO: 41.8 % (ref 34–46.6)
HDLC SERPL-MCNC: 82 MG/DL (ref 40–60)
HGB BLD-MCNC: 13.4 G/DL (ref 12–15.9)
IMM GRANULOCYTES # BLD AUTO: 0.01 10*3/MM3 (ref 0–0.05)
IMM GRANULOCYTES NFR BLD AUTO: 0.2 % (ref 0–0.5)
LDLC SERPL CALC-MCNC: 148 MG/DL (ref 0–100)
LYMPHOCYTES # BLD AUTO: 1.12 10*3/MM3 (ref 0.7–3.1)
LYMPHOCYTES NFR BLD AUTO: 26.9 % (ref 19.6–45.3)
MCH RBC QN AUTO: 29.2 PG (ref 26.6–33)
MCHC RBC AUTO-ENTMCNC: 32.1 G/DL (ref 31.5–35.7)
MCV RBC AUTO: 91.1 FL (ref 79–97)
MONOCYTES # BLD AUTO: 0.4 10*3/MM3 (ref 0.1–0.9)
MONOCYTES NFR BLD AUTO: 9.6 % (ref 5–12)
NEUTROPHILS # BLD AUTO: 2.47 10*3/MM3 (ref 1.7–7)
NEUTROPHILS NFR BLD AUTO: 59.5 % (ref 42.7–76)
NRBC BLD AUTO-RTO: 0 /100 WBC (ref 0–0.2)
PLATELET # BLD AUTO: 235 10*3/MM3 (ref 140–450)
POTASSIUM SERPL-SCNC: 4.3 MMOL/L (ref 3.5–5.2)
PROT SERPL-MCNC: 6.5 G/DL (ref 6–8.5)
RBC # BLD AUTO: 4.59 10*6/MM3 (ref 3.77–5.28)
SODIUM SERPL-SCNC: 140 MMOL/L (ref 136–145)
TRIGL SERPL-MCNC: 44 MG/DL (ref 0–150)
TSH SERPL DL<=0.005 MIU/L-ACNC: 4.21 UIU/ML (ref 0.27–4.2)
VLDLC SERPL CALC-MCNC: 7 MG/DL (ref 5–40)
WBC # BLD AUTO: 4.16 10*3/MM3 (ref 3.4–10.8)

## 2023-09-21 DIAGNOSIS — E78.5 HYPERLIPIDEMIA, UNSPECIFIED HYPERLIPIDEMIA TYPE: Primary | ICD-10-CM

## 2023-09-21 DIAGNOSIS — E03.9 ACQUIRED HYPOTHYROIDISM: ICD-10-CM

## 2023-09-25 ENCOUNTER — OFFICE VISIT (OUTPATIENT)
Dept: NEUROSURGERY | Facility: CLINIC | Age: 63
End: 2023-09-25

## 2023-09-25 VITALS
BODY MASS INDEX: 19.49 KG/M2 | HEIGHT: 63 IN | SYSTOLIC BLOOD PRESSURE: 124 MMHG | WEIGHT: 110 LBS | DIASTOLIC BLOOD PRESSURE: 72 MMHG

## 2023-09-25 DIAGNOSIS — G89.29 CHRONIC NECK PAIN: Primary | ICD-10-CM

## 2023-09-25 DIAGNOSIS — G56.23 ULNAR NEUROPATHY OF BOTH UPPER EXTREMITIES: ICD-10-CM

## 2023-09-25 DIAGNOSIS — M54.2 CHRONIC NECK PAIN: Primary | ICD-10-CM

## 2023-09-25 DIAGNOSIS — G56.01 CARPAL TUNNEL SYNDROME OF RIGHT WRIST: ICD-10-CM

## 2023-09-25 DIAGNOSIS — M50.122 CERVICAL DISC DISORDER AT C5-C6 LEVEL WITH RADICULOPATHY: ICD-10-CM

## 2023-09-25 DIAGNOSIS — G89.29 CHRONIC MIDLINE LOW BACK PAIN WITH BILATERAL SCIATICA: ICD-10-CM

## 2023-09-25 DIAGNOSIS — M54.42 CHRONIC MIDLINE LOW BACK PAIN WITH BILATERAL SCIATICA: ICD-10-CM

## 2023-09-25 DIAGNOSIS — M54.41 CHRONIC MIDLINE LOW BACK PAIN WITH BILATERAL SCIATICA: ICD-10-CM

## 2023-09-25 PROCEDURE — 99213 OFFICE O/P EST LOW 20 MIN: CPT | Performed by: NEUROLOGICAL SURGERY

## 2023-09-25 PROCEDURE — 1159F MED LIST DOCD IN RCRD: CPT | Performed by: NEUROLOGICAL SURGERY

## 2023-09-25 PROCEDURE — 1160F RVW MEDS BY RX/DR IN RCRD: CPT | Performed by: NEUROLOGICAL SURGERY

## 2023-09-25 NOTE — PROGRESS NOTES
"Subjective   Patient ID: Leonie Becker is a 63 y.o. female is being seen for consultation today at the request of No ref. provider found    She saw Dr. Ruiz since I last spoke with her who recommended a transposition of the left ulnar nerve.  Dr. Ortzi had recommended the same.  She is unsure about what to do.  Her neck pain is stable.  She has known cervical stenosis but no indications for surgery at this time.  The lumbar region improved with the therapy.  I will continue to follow her for her neck but her decision about the ulnar surgery remains in her court.    History of Present Illness    The following portions of the patient's history were reviewed and updated as appropriate: allergies, current medications, past family history, past medical history, past social history, past surgical history, and problem list.    Review of Systems   Constitutional:  Negative for fever.   Musculoskeletal:  Positive for neck pain and neck stiffness.   Neurological:  Negative for weakness and numbness.   All other systems reviewed and are negative.        Objective     Vitals:    09/25/23 1242   BP: 124/72   BP Location: Left arm   Patient Position: Sitting   Cuff Size: Adult   Weight: 49.9 kg (110 lb)   Height: 160 cm (62.99\")   PainSc:   3   PainLoc: Neck     Body mass index is 19.49 kg/m².    Tobacco Use: Medium Risk    Smoking Tobacco Use: Former    Smokeless Tobacco Use: Never    Passive Exposure: Not on file          Physical Exam  Constitutional:       Appearance: She is well-developed.   HENT:      Head: Normocephalic and atraumatic.   Eyes:      Extraocular Movements: EOM normal.      Conjunctiva/sclera: Conjunctivae normal.      Pupils: Pupils are equal, round, and reactive to light.   Neck:      Vascular: No carotid bruit.   Neurological:      Mental Status: She is oriented to person, place, and time.      Coordination: Finger-Nose-Finger Test and Heel to Shin Test normal.      Gait: Gait is intact.      Deep " Tendon Reflexes:      Reflex Scores:       Tricep reflexes are 2+ on the right side and 2+ on the left side.       Bicep reflexes are 2+ on the right side and 2+ on the left side.       Brachioradialis reflexes are 2+ on the right side and 2+ on the left side.       Patellar reflexes are 2+ on the right side and 2+ on the left side.       Achilles reflexes are 2+ on the right side and 2+ on the left side.  Psychiatric:         Speech: Speech normal.     Neurologic Exam     Mental Status   Oriented to person, place, and time.   Registration of memory: Good recent and remote memory.   Attention: normal. Concentration: normal.   Speech: speech is normal   Level of consciousness: alert  Knowledge: consistent with education.     Cranial Nerves     CN II   Visual fields full to confrontation.   Visual acuity: normal    CN III, IV, VI   Pupils are equal, round, and reactive to light.  Extraocular motions are normal.     CN V   Facial sensation intact.   Right corneal reflex: normal  Left corneal reflex: normal    CN VII   Facial expression full, symmetric.   Right facial weakness: none  Left facial weakness: none    CN VIII   Hearing: intact    CN IX, X   Palate: symmetric    CN XI   Right sternocleidomastoid strength: normal  Left sternocleidomastoid strength: normal    CN XII   Tongue: not atrophic  Tongue deviation: none    Motor Exam   Muscle bulk: normal  Right arm tone: normal  Left arm tone: normal  Right leg tone: normal  Left leg tone: normal    Strength   Strength 5/5 except as noted.     Sensory Exam   Light touch normal.     Gait, Coordination, and Reflexes     Gait  Gait: normal    Coordination   Finger to nose coordination: normal  Heel to shin coordination: normal    Reflexes   Right brachioradialis: 2+  Left brachioradialis: 2+  Right biceps: 2+  Left biceps: 2+  Right triceps: 2+  Left triceps: 2+  Right patellar: 2+  Left patellar: 2+  Right achilles: 2+  Left achilles: 2+  Right : 2+  Left :  2+        Assessment & Plan   Independent Review of Radiographic Studies:      I personally reviewed the images from the following studies.    The x-rays done on 3/28/2023 do not show any spondylolisthesis or significant disc base collapse.  Rather unremarkable.  Agree with the report.       Medical Decision Making:      She will let us know what she would like to do about the ulnar problem.  Otherwise I will see her in 6 months for her neck and her low back, the latter being much better.        Diagnoses and all orders for this visit:    1. Chronic neck pain (Primary)    2. Cervical disc disorder at C5-C6 level with radiculopathy    3. Ulnar neuropathy of both upper extremities    4. Carpal tunnel syndrome of right wrist    5. Chronic midline low back pain with bilateral sciatica      Return in about 6 months (around 3/25/2024) for Face-to-face.

## 2023-09-26 LAB
ALBUMIN SERPL-MCNC: 4.6 G/DL (ref 3.5–5.2)
ALBUMIN/GLOB SERPL: 1.8 G/DL
ALP SERPL-CCNC: 82 U/L (ref 39–117)
ALT SERPL-CCNC: 22 U/L (ref 1–33)
AST SERPL-CCNC: 25 U/L (ref 1–32)
BASOPHILS # BLD AUTO: 0.05 10*3/MM3 (ref 0–0.2)
BASOPHILS NFR BLD AUTO: 1.2 % (ref 0–1.5)
BILIRUB SERPL-MCNC: 0.3 MG/DL (ref 0–1.2)
BUN SERPL-MCNC: 18 MG/DL (ref 8–23)
BUN/CREAT SERPL: 15.9 (ref 7–25)
CALCIUM SERPL-MCNC: 9.9 MG/DL (ref 8.6–10.5)
CHLORIDE SERPL-SCNC: 105 MMOL/L (ref 98–107)
CHOLEST SERPL-MCNC: 258 MG/DL (ref 0–200)
CO2 SERPL-SCNC: 27.5 MMOL/L (ref 22–29)
CREAT SERPL-MCNC: 1.13 MG/DL (ref 0.57–1)
EGFRCR SERPLBLD CKD-EPI 2021: 54.1 ML/MIN/1.73
EOSINOPHIL # BLD AUTO: 0.12 10*3/MM3 (ref 0–0.4)
EOSINOPHIL NFR BLD AUTO: 2.9 % (ref 0.3–6.2)
ERYTHROCYTE [DISTWIDTH] IN BLOOD BY AUTOMATED COUNT: 12.1 % (ref 12.3–15.4)
GLOBULIN SER CALC-MCNC: 2.5 GM/DL
GLUCOSE SERPL-MCNC: 98 MG/DL (ref 65–99)
HCT VFR BLD AUTO: 41.8 % (ref 34–46.6)
HDLC SERPL-MCNC: 69 MG/DL (ref 40–60)
HGB BLD-MCNC: 13.6 G/DL (ref 12–15.9)
IMM GRANULOCYTES # BLD AUTO: 0.01 10*3/MM3 (ref 0–0.05)
IMM GRANULOCYTES NFR BLD AUTO: 0.2 % (ref 0–0.5)
LDLC SERPL CALC-MCNC: 169 MG/DL (ref 0–100)
LYMPHOCYTES # BLD AUTO: 1.17 10*3/MM3 (ref 0.7–3.1)
LYMPHOCYTES NFR BLD AUTO: 27.9 % (ref 19.6–45.3)
MCH RBC QN AUTO: 29.6 PG (ref 26.6–33)
MCHC RBC AUTO-ENTMCNC: 32.5 G/DL (ref 31.5–35.7)
MCV RBC AUTO: 91.1 FL (ref 79–97)
MONOCYTES # BLD AUTO: 0.46 10*3/MM3 (ref 0.1–0.9)
MONOCYTES NFR BLD AUTO: 11 % (ref 5–12)
NEUTROPHILS # BLD AUTO: 2.38 10*3/MM3 (ref 1.7–7)
NEUTROPHILS NFR BLD AUTO: 56.8 % (ref 42.7–76)
NRBC BLD AUTO-RTO: 0 /100 WBC (ref 0–0.2)
PLATELET # BLD AUTO: 250 10*3/MM3 (ref 140–450)
POTASSIUM SERPL-SCNC: 4.3 MMOL/L (ref 3.5–5.2)
PROT SERPL-MCNC: 7.1 G/DL (ref 6–8.5)
RBC # BLD AUTO: 4.59 10*6/MM3 (ref 3.77–5.28)
SODIUM SERPL-SCNC: 142 MMOL/L (ref 136–145)
T4 FREE SERPL-MCNC: 1.02 NG/DL (ref 0.93–1.7)
TRIGL SERPL-MCNC: 113 MG/DL (ref 0–150)
TSH SERPL DL<=0.005 MIU/L-ACNC: 4.64 UIU/ML (ref 0.27–4.2)
VLDLC SERPL CALC-MCNC: 20 MG/DL (ref 5–40)
WBC # BLD AUTO: 4.19 10*3/MM3 (ref 3.4–10.8)

## 2023-10-02 ENCOUNTER — OFFICE VISIT (OUTPATIENT)
Dept: INTERNAL MEDICINE | Facility: CLINIC | Age: 63
End: 2023-10-02
Payer: MEDICARE

## 2023-10-02 VITALS
DIASTOLIC BLOOD PRESSURE: 78 MMHG | WEIGHT: 106 LBS | SYSTOLIC BLOOD PRESSURE: 108 MMHG | HEIGHT: 63 IN | BODY MASS INDEX: 18.78 KG/M2 | OXYGEN SATURATION: 99 % | HEART RATE: 67 BPM

## 2023-10-02 DIAGNOSIS — E03.8 OTHER SPECIFIED HYPOTHYROIDISM: Primary | ICD-10-CM

## 2023-10-02 DIAGNOSIS — E78.5 HYPERLIPIDEMIA, UNSPECIFIED HYPERLIPIDEMIA TYPE: ICD-10-CM

## 2023-10-02 PROCEDURE — 1160F RVW MEDS BY RX/DR IN RCRD: CPT | Performed by: INTERNAL MEDICINE

## 2023-10-02 PROCEDURE — 99214 OFFICE O/P EST MOD 30 MIN: CPT | Performed by: INTERNAL MEDICINE

## 2023-10-02 PROCEDURE — 1159F MED LIST DOCD IN RCRD: CPT | Performed by: INTERNAL MEDICINE

## 2023-11-28 ENCOUNTER — TELEPHONE (OUTPATIENT)
Dept: INTERNAL MEDICINE | Facility: CLINIC | Age: 63
End: 2023-11-28
Payer: MEDICARE

## 2023-11-28 NOTE — TELEPHONE ENCOUNTER
Caller: Leonie Becker    Relationship: Self    Best call back number: [phone]    What is the best time to reach you: ANY    Who are you requesting to speak with (clinical staff, provider,  specific staff member): CLINICAL    Do you know the name of the person who called: PATIENT    What was the call regarding: PATIENT HAS HAD A HEMORRHOID BURST LAST NIGHT WHILE GOING PEE.    PATIENT QUESTIONING WHAT SHE NEEDS TO DO.     QUESTIONING IF SHE CAN OR SHOULD PUT PREPARATION H ON IT OR IF SHE USE SOMETHING COMPLETELY DIFFERENT NOW THAT IT IS AN OPEN WOUND.    Is it okay if the provider responds through [employer]: NO, PHONE CALL     [facility] #[id number] [facility] [address] [facility] AT [facility] - [phone] [facility] [phone]  [phone]

## 2023-11-28 NOTE — TELEPHONE ENCOUNTER
Happy to see for OV if needed.    I recommend preparation H OTC cream.  I would recommend warm soaks in bath.  I would recommend metamucil fiber and stool softener to ensure stools are soft.

## 2023-11-29 ENCOUNTER — HOSPITAL ENCOUNTER (EMERGENCY)
Facility: HOSPITAL | Age: 63
Discharge: HOME | End: 2023-11-29
Attending: EMERGENCY MEDICINE | Admitting: EMERGENCY MEDICINE
Payer: MEDICARE

## 2023-11-29 VITALS
DIASTOLIC BLOOD PRESSURE: 57 MMHG | OXYGEN SATURATION: 97 % | HEIGHT: 63 IN | RESPIRATION RATE: 16 BRPM | HEART RATE: 83 BPM | WEIGHT: 109 LBS | SYSTOLIC BLOOD PRESSURE: 105 MMHG | TEMPERATURE: 98.3 F | BODY MASS INDEX: 19.31 KG/M2

## 2023-11-29 DIAGNOSIS — K64.9 ACUTE HEMORRHOID: Primary | ICD-10-CM

## 2023-11-29 PROCEDURE — 99282 EMERGENCY DEPT VISIT SF MDM: CPT

## 2023-11-29 PROCEDURE — 99283 EMERGENCY DEPT VISIT LOW MDM: CPT

## 2023-11-29 RX ORDER — LIDOCAINE 50 MG/G
1 OINTMENT TOPICAL
Qty: 30 G | Refills: 0 | Status: SHIPPED | OUTPATIENT
Start: 2023-11-29

## 2023-11-29 NOTE — DISCHARGE INSTRUCTIONS
Rest at home avoiding any particularly strenuous activity today and tomorrow.     Use stool softener (like colace over the counter) as needed if any hard stools/constipation to keep from straining which may make hemorrhoid more inflamed.    Eat small, frequent meals and drink plenty of fluids. Take any medication prescribed as instructed.      Monitor for any signs of recurrent symptoms or worsening and see your primary doctor to discuss your ER visit while returning to the ER if any concerns as we discussed.

## 2023-11-29 NOTE — ED TRIAGE NOTES
Patient to ED via PV from home. Patient c/o rectal bleeding and pain that began at midnight last night.

## 2023-11-29 NOTE — ED PROVIDER NOTES
" EMERGENCY DEPARTMENT ENCOUNTER    Room Number:  22/22  PCP: Rachael Oconnell MD  Patient Care Team:  Rachael Oconnell MD as PCP - General (Internal Medicine)  Maci Jennings MD as Consulting Physician (Obstetrics and Gynecology)   Independent Historians: Patient    HPI:  Chief Complaint: Rectal bleeding and pain    A complete HPI/ROS/PMH/PSH/SH/FH are unobtainable due to: None    Chronic or social conditions impacting patient care (Social Determinants of Health): None  (Financial Resource Strain / Food Insecurity / Transportation Needs / Physical Activity / Stress / Social Connections / Intimate Partner Violence / Housing Stability)    Context: Leonie Becker is a 63 y.o. female who presents to the ED c/o acute rectal bleeding and pain.  Early Tuesday morning patient had a forceful urination and noted blood in the toilet at that time.  Since that time she has had rectal pain with occasional bright red bleeding.  She has felt a \"lump\" at her anal opening and feels like if she had hemorrhoid that had ruptured so she should have a lump remaining.  With a mirror she thought she saw some tissue associated with the lump.  Patient made an appointment with colorectal surgeon Dr. Clemente for Monday but was told she should be seen sooner if she is continue to have bleeding and concerned.  Patient has never had any colorectal surgeries in the past.    Review of prior external notes (non-ED) -and- Review of prior external test results outside of this encounter: I reviewed patient's last primary care note from  where patient is treated for her hypothyroidism and hyperlipidemia.  Patient is noted to be on a very small dose of levothyroxine.  Per record review patient is not on any anticoagulation.    Prescription drug monitoring program review:         PAST MEDICAL HISTORY  Active Ambulatory Problems     Diagnosis Date Noted    Tinnitus 02/01/2016    Atopic rhinitis 03/23/2016    Chronic neck pain 03/23/2016    " Chronic interstitial cystitis 03/23/2016    Degeneration of cervical intervertebral disc 03/23/2016    Hyperlipidemia 03/23/2016    Osteoporosis 03/23/2016    Pelvic floor dysfunction 03/24/2016    Other specified hypothyroidism 02/08/2019    Ulnar neuropathy of both upper extremities 08/17/2020    Carpal tunnel syndrome of right wrist 08/17/2020    Cervical spondylosis 01/21/2020    Cubital tunnel syndrome 09/08/2020    Hyperacusis, bilateral 07/16/2018    Osteoarthritis (arthritis due to wear and tear of joints) 03/12/2012    DDD (degenerative disc disease), lumbar 01/21/2020    Displacement of cervical intervertebral disc without myelopathy 10/25/2021    Cervical disc disorder at C5-C6 level with radiculopathy 08/22/2022    Vitamin D deficiency 03/30/2022    Chronic midline low back pain with bilateral sciatica 03/22/2023     Resolved Ambulatory Problems     Diagnosis Date Noted    Degeneration of intervertebral disc of mid-cervical region 02/01/2016    Abnormal LFTs 03/24/2016    Subclinical hypothyroidism 01/25/2019     Past Medical History:   Diagnosis Date    Abnormal blood smear     Abnormal liver function test     Allergic Seasonal    Arm pain     Arthritis     Back pain     Blood tests for routine general physical examination     Cataract Slow growing    Facial basal cell cancer     H/O bladder infections     Hand pain     Limb pain     Neck pain     Neuromuscular disorder 12/2019    Osteopenia Osteopenia    Pelvic pain     Rosacea          PAST SURGICAL HISTORY  Past Surgical History:   Procedure Laterality Date    COLONOSCOPY  2014    CYSTOSCOPY      Diagnostic    KNEE SURGERY      ULNAR TUNNEL RELEASE           FAMILY HISTORY  Family History   Problem Relation Age of Onset    Hypertension Mother         benign essential    Diabetes Mother         Type II    Heart disease Mother     Thyroid disease Mother     Asthma Mother     Depression Mother     Aortic aneurysm Father         Thoracic    Glaucoma  Father     Vision loss Father         Glaucoma    Other Father         Dissected Aorta    Depression Sister     Thyroid disease Sister     Diabetes Sister         Type II    Vision loss Sister         Glaucoma         SOCIAL HISTORY  Social History     Socioeconomic History    Marital status: Single   Tobacco Use    Smoking status: Former     Packs/day: 0.25     Years: 2.00     Additional pack years: 0.00     Total pack years: 0.50     Types: Cigarettes     Start date: 1991     Quit date: 1993     Years since quittin.9    Smokeless tobacco: Never   Substance and Sexual Activity    Alcohol use: No    Drug use: No    Sexual activity: Not Currently         ALLERGIES  Zoster vac recomb adjuvanted and Crestor [rosuvastatin calcium]        REVIEW OF SYSTEMS  Review of Systems  Included in HPI  All systems reviewed and negative except for those discussed in HPI.      PHYSICAL EXAM    I have reviewed the triage vital signs and nursing notes.    ED Triage Vitals [23 1135]   Temp Heart Rate Resp BP SpO2   98.3 °F (36.8 °C) 83 17 -- 97 %      Temp src Heart Rate Source Patient Position BP Location FiO2 (%)   -- -- -- -- --       Physical Exam  GENERAL: Pleasant cooperative and conversant  female, alert, no acute distress  SKIN: Warm, dry  HENT: Normocephalic, atraumatic  EYES: no scleral icterus, no conjunctivitis  RESPIRATORY: Relaxed breathing  ABDOMEN/rectal: Examined with  Fouzia RN at bedside as well chaperoning exam, no anal fissure,+ inflamed hemorrhoid with rupture and swelling with bright red blood and clot material extruding from hemorrhoid, no signs of abscess or any other perineal abnormality  MUSCULOSKELETAL: no deformity  NEURO: alert, moves all extremities, follows commands                                                                     MEDICATIONS GIVEN IN ER    Medications - No data to display      ORDERS PLACED DURING THIS VISIT:  No orders of the defined types were placed in this  encounter.        PROGRESS, DATA ANALYSIS, CONSULTS, AND MEDICAL DECISION MAKING    All labs have been independently interpreted by me.  All radiology studies have been reviewed by me.   EKG's independently viewed and interpreted by me.  Discussion below represents my analysis of pertinent findings related to patient's condition, differential diagnosis, treatment plan and final disposition.    MDM on exam patient with inflamed hemorrhoid that is open and bleeding although does have some retained clot material, recommended topical medications including topical anesthetic and pramoxine and hydrocortisone to help alleviate the inflammation.  We also discussed in detail benefits of his sitz bath.  Patient has close follow-up with colorectal surgery on Monday.           PPE: I wore and adhered to appropriate PPE per hospital protocols for specific patient presentation. (For respiratory patients with suspected Covid-19 or other infectious etiology suspected for patient's symptoms, the patient wore a mask and I wore an N95 mask throughout the entire patient encounter.) Proper hand hygiene both before and after patient encounter was performed as well.         AS OF 12:31 EST VITALS:    BP - 132/75  HR - 83  TEMP - 98.3 °F (36.8 °C)  O2 SATS - 97%        DIAGNOSIS  Final diagnoses:   Acute hemorrhoid         DISPOSITION  ED Disposition       ED Disposition   Discharge    Condition   Stable    Comment   --                  Note Disclaimer: At [facility], we believe that sharing information builds trust and better relationships. You are receiving this note because you recently visited [facility]. It is possible you will see health information before a provider has talked with you about it. This kind of information can be easy to misunderstand. To help you fully understand what it means for your health, we urge you to discuss this note with your provider.         Poonam Lamas MD  11/29/23 0571

## 2023-12-04 ENCOUNTER — OFFICE VISIT (OUTPATIENT)
Dept: SURGERY | Facility: CLINIC | Age: 63
End: 2023-12-04
Payer: MEDICARE

## 2023-12-04 VITALS
SYSTOLIC BLOOD PRESSURE: 120 MMHG | WEIGHT: 107.9 LBS | OXYGEN SATURATION: 98 % | BODY MASS INDEX: 19.12 KG/M2 | HEART RATE: 62 BPM | DIASTOLIC BLOOD PRESSURE: 70 MMHG | HEIGHT: 63 IN

## 2023-12-04 DIAGNOSIS — K64.5 THROMBOSED HEMORRHOIDS: Primary | ICD-10-CM

## 2023-12-04 PROCEDURE — 99204 OFFICE O/P NEW MOD 45 MIN: CPT | Performed by: PHYSICIAN ASSISTANT

## 2023-12-04 PROCEDURE — 1159F MED LIST DOCD IN RCRD: CPT | Performed by: PHYSICIAN ASSISTANT

## 2023-12-04 PROCEDURE — 1160F RVW MEDS BY RX/DR IN RCRD: CPT | Performed by: PHYSICIAN ASSISTANT

## 2023-12-04 RX ORDER — HYDROCORTISONE 25 MG/G
CREAM TOPICAL
Qty: 30 G | Refills: 1 | Status: SHIPPED | OUTPATIENT
Start: 2023-12-04

## 2023-12-04 RX ORDER — PROMETHAZINE HYDROCHLORIDE 12.5 MG/1
12.5 TABLET ORAL EVERY 6 HOURS PRN
COMMUNITY

## 2023-12-04 RX ORDER — FLUOCINOLONE ACETONIDE 0.11 MG/ML
5 OIL AURICULAR (OTIC) 2 TIMES DAILY
COMMUNITY

## 2023-12-04 NOTE — PROGRESS NOTES
"Leonie Becker is a 63 y.o. female who is seen as a self-referred consult for rectal bleeding.      HPI:  The patient states that she normally uses clobetasol vaginally and perianally. About a week ago, when she was applying this treatment, she felt a bump in the anal area.  She thought it was likely just a hemorrhoid.  Then she had a significant amount of bleeding, accompanied by breaking out in a sweat and almost fainting.  Because of the symptoms, she went to the emergency department on the recommendation of a friend of hers who is a nurse. She says that at the emergency room, they told her that her hemorrhoid had coagulated and then ruptured. They recommended topical lidocaine and hydrocortisone-pramoxine cream.    The bleeding decreased over the next couple of days.  She felt like there was a \"balloon\" sticking out of the anal area.  This has waxed and waned in size over the last week.  Preparation H did not help. There were times when she was unable to sit due to pain.    Was feeling a bit better Saturday.  It felt irritated yesterday, so she didn't use the cream.  The size of the bump has decreased considerably. Sometimes she she can't feel it, but other times she can. Has been eating more fiber lately and exercising more.  Some of the medications she takes can be constipating. Taking benefiber. Has BM almost daily. Feels irritated afterward. Virgil 3 usually. Trying to increase water intake.  Hasn't seen blood per rectum since Friday when started topical treatment for hemorrhoid.    Denies family history of colon cancer or colon polyps.    Past Medical History:   Diagnosis Date    Abnormal blood smear     Abnormal liver function test     Allergic Seasonal    Arm pain     Arthritis     Back pain     Blood tests for routine general physical examination     Cataract Slow growing    Facial basal cell cancer     H/O bladder infections     Hand pain     cubital tunnel syndrome    Hyperlipidemia     Limb pain     " Neck pain     Neuromuscular disorder 2019    Cubital Tunnel Syndrome both arms    Osteopenia Osteopenia    Osteoporosis     Pelvic pain     Rectal bleeding 2023    Hemorrhoid burst    Rosacea     Tinnitus        Past Surgical History:   Procedure Laterality Date    COLONOSCOPY      CYSTOSCOPY      Diagnostic    KNEE SURGERY      ULNAR TUNNEL RELEASE         Social History:   reports that she quit smoking about 30 years ago. Her smoking use included cigarettes. She started smoking about 32 years ago. She has a 0.50 pack-year smoking history. She has been exposed to tobacco smoke. She has never used smokeless tobacco. She reports that she does not drink alcohol and does not use drugs.      Marriage status: Single    Family History   Problem Relation Age of Onset    Hypertension Mother         benign essential    Diabetes Mother         Type II    Heart disease Mother          of heart attack (presumed)    Thyroid disease Mother     Asthma Mother     Depression Mother     Aortic aneurysm Father         Thoracic    Glaucoma Father     Vision loss Father         Glaucoma    Other Father         Dissected Aorta    Depression Sister     Thyroid disease Sister     Diabetes Sister         Type II    Vision loss Sister         Glaucoma         Current Outpatient Medications:     baclofen (LIORESAL) 10 MG tablet, Take by mouth 3 (three) times a day as needed. 1 to 2 tablets, Disp: , Rfl:     clobetasol (TEMOVATE) 0.05 % ointment, clobetasol 0.05 % topical ointment  APPLY THIN LAYER THREE TIMES DAILY FOR 3 WEEKS THEN ONCE DAILY UNTIL SKIN LOOKS AND FEELS NORMAL THEN USE TWICE WEEKLY, Disp: , Rfl:     diazepam (VALIUM) 5 MG tablet, Take 1 tablet by mouth At Night As Needed., Disp: , Rfl:     Estriol powder, , Disp: , Rfl:     fluocinolone acetonide (DERMOTIC) 0.01 % oil otic oil, Administer 5 drops into both ears 2 (Two) Times a Day., Disp: , Rfl:     Hormone Cream Base cream, , Disp: , Rfl:     Ivermectin  powder, , Disp: , Rfl:     lidocaine (XYLOCAINE) 5 % ointment, Apply 1 application  topically to the appropriate area as directed Every 2 (Two) Hours As Needed for Moderate Pain., Disp: 30 g, Rfl: 0    metroNIDAZOLE (METROCREAM) 0.75 % cream, Apply 1 application  topically to the appropriate area as directed 2 (Two) Times a Day., Disp: , Rfl:     nortriptyline (PAMELOR) 10 MG capsule, Take 1 capsule by mouth Every Night., Disp: , Rfl:     Omega-3 Fatty Acids (FISH OIL PO), Take by mouth., Disp: , Rfl:     oxyCODONE (ROXICODONE) 5 MG immediate release tablet, Take 1 to 2 tablets every 4 to 6 hours as needed for pain, Disp: , Rfl:     PREBIOTIC PRODUCT PO, Take  by mouth., Disp: , Rfl:     Probiotic Product (PROBIOTIC ADVANCED PO), Take  by mouth., Disp: , Rfl:     Progesterone Micronized (PROGESTERONE, BULK,) powder, Place on the skin.  , Disp: , Rfl:     promethazine (PHENERGAN) 12.5 MG tablet, Take 1 tablet by mouth Every 6 (Six) Hours As Needed., Disp: , Rfl:     traMADol (ULTRAM) 50 MG tablet, Take 1 tablet by mouth 2 (Two) Times a Day As Needed., Disp: , Rfl:     Hydrocortisone, Perianal, (Anusol-HC) 2.5 % rectal cream, Apply to hemorrhoids 3 times daily for 7 days during hemorrhoid flare. Include applicator. Use for 7 days, then take a break for 7 days before resuming this pattern., Disp: 30 g, Rfl: 1    levothyroxine (SYNTHROID, LEVOTHROID) 25 MCG tablet, Take 1 tablet by mouth Daily. (Patient not taking: Reported on 10/2/2023), Disp: 90 tablet, Rfl: 3    Allergy  Crestor [rosuvastatin calcium] and Zoster vac recomb adjuvanted    Vitals:    12/04/23 1116   BP: 120/70   Pulse: 62   SpO2: 98%   -  Body mass index is 19.11 kg/m².    Physical Exam  No acute distress  Chaperone present  Perianal exam: in the right anterior position there is an enlarged external hemorrhoid with a skin ulceration consistent with recently thrombosed hemorrhoid.  It is tender to palpation.    Review of Medical Record:  I reviewed  records as noted in HPI.    Assessment:  1. Thrombosed hemorrhoids      Plan:  Sent anusol cream to pharmacy for hemorrhoid  Continue increasing fiber and adding miralax to keep stool soft  Follow up in 6 weeks  Schedule colonoscopy at follow-up appointment    Yolie Saleh PA-C  Physician Assistant  Colorectal Surgery

## 2023-12-15 ENCOUNTER — TELEPHONE (OUTPATIENT)
Dept: SURGERY | Facility: CLINIC | Age: 63
End: 2023-12-15
Payer: MEDICARE

## 2023-12-15 NOTE — TELEPHONE ENCOUNTER
Last seen by Yolie on 12-4-23, was prescribed HC 2.5% cream and she used it for 2 weeks. She stopped using it Monday. Now she has a flare today. She feels a lump in the right, inside her rectum. Area is sore and It is uncomfortable to sit. Denies bleeding. Pt said BMs are wnl and she hasn't done anything strenuous or any lifting. Pt wants to know what she should do or if she should be seen?

## 2024-01-16 ENCOUNTER — TELEPHONE (OUTPATIENT)
Dept: SURGERY | Facility: CLINIC | Age: 64
End: 2024-01-16

## 2024-01-23 ENCOUNTER — HOSPITAL ENCOUNTER (EMERGENCY)
Facility: HOSPITAL | Age: 64
Discharge: HOME | End: 2024-01-23
Attending: STUDENT IN AN ORGANIZED HEALTH CARE EDUCATION/TRAINING PROGRAM | Admitting: STUDENT IN AN ORGANIZED HEALTH CARE EDUCATION/TRAINING PROGRAM
Payer: MEDICARE

## 2024-01-23 VITALS
TEMPERATURE: 97.6 F | BODY MASS INDEX: 19.14 KG/M2 | DIASTOLIC BLOOD PRESSURE: 47 MMHG | HEART RATE: 79 BPM | HEIGHT: 63 IN | SYSTOLIC BLOOD PRESSURE: 120 MMHG | RESPIRATION RATE: 16 BRPM | WEIGHT: 108 LBS | OXYGEN SATURATION: 98 %

## 2024-01-23 DIAGNOSIS — H18.892 CORNEAL IRRITATION OF LEFT EYE: Primary | ICD-10-CM

## 2024-01-23 PROCEDURE — 99283 EMERGENCY DEPT VISIT LOW MDM: CPT

## 2024-01-23 RX ORDER — PROPARACAINE HYDROCHLORIDE 5 MG/ML
1 SOLUTION/ DROPS OPHTHALMIC ONCE
Status: COMPLETED | OUTPATIENT
Start: 2024-01-23 | End: 2024-01-23

## 2024-01-23 RX ADMIN — FLUORESCEIN SODIUM 1 STRIP: 1 STRIP OPHTHALMIC at 06:01

## 2024-01-23 RX ADMIN — PROPARACAINE HYDROCHLORIDE 1 DROP: 5 SOLUTION/ DROPS OPHTHALMIC at 06:01

## 2024-01-23 NOTE — ED PROVIDER NOTES
EMERGENCY DEPARTMENT MD ATTESTATION NOTE    SHARED VISIT: This visit was performed by BOTH a physician and an APC. The substantive portion of the medical decision making was performed by this attesting physician who made or approved the management plan and takes responsibility for patient management. All studies documented in the APC note (if performed) were independently interpreted by me.    The ELFEGO and I have discussed this patient's history, physical exam, MDM, and treatment plan.  I have reviewed the documentation and personally had a face to face interaction with the patient. The attached note describes my personal findings.        Room Number:  08/08  PCP: Rachael Oconnell MD  Independent Historians: Patient    HPI:    Context: Leonie Becker is a 63 y.o. female with a medical history of hyperlipidemia, neck pain who presents to the ED c/o acute eye irritation.  Patient states she got hair dye and her eye last night and has had a burning sensation since then        Review of prior external notes (non-ED) -and- Review of prior external test results outside of this encounter: Office visit with colonic surgery from 12/4/2023 reviewed and notable for visit secondary to thrombosed hemorrhoids.  Patient started on Anusol cream and instructed to increase fiber and add MiraLAX, follow-up in 6 weeks and obtain colonoscopy    PHYSICAL EXAM    I have reviewed the triage vital signs and nursing notes.    ED Triage Vitals   Temp Heart Rate Resp BP SpO2   01/23/24 0430 01/23/24 0430 01/23/24 0430 01/23/24 0433 01/23/24 0430   97.6 °F (36.4 °C) 79 16 120/47 98 %      Temp src Heart Rate Source Patient Position BP Location FiO2 (%)   01/23/24 0430 -- -- -- --   Tympanic           Physical Exam  GENERAL: alert, no acute distress  SKIN: Warm, dry  HENT: Normocephalic, atraumatic  EYES: no scleral icterus  CV: regular rhythm, regular rate  RESPIRATORY: normal effort, lungs clear  ABDOMEN: soft, nontender,  nondistended  MUSCULOSKELETAL: no deformity  NEURO: alert, moves all extremities, follows commands      MEDICATIONS GIVEN IN ER  Medications   proparacaine (ALCAINE) 0.5 % ophthalmic solution 1 drop (1 drop Left Eye Given 1/23/24 0601)   fluorescein ophthalmic strip 1 strip (1 strip Both Eyes Given 1/23/24 0601)         ORDERS PLACED DURING THIS VISIT:  No orders of the defined types were placed in this encounter.          PROGRESS, DATA ANALYSIS, CONSULTS, AND MEDICAL DECISION MAKING  All labs have been independently interpreted by me.  All radiology studies have been reviewed by me. All EKG's have been independently viewed and interpreted by me.  Discussion below represents my analysis of pertinent findings related to patient's condition, differential diagnosis, treatment plan and final disposition.    Differential diagnosis includes but is not limited to toxic exposure, conjunctivitis, eye irritation.      ED Course as of 01/24/24 0403   Tue Jan 23, 2024   0507 Visual Assessment  Visual Acuity-Left: 20/20; with corrective lenses  Visual Acuity-Right: 20/20; with corrective lenses  Visual Acuity-Bilateral: 20/20; with corrective lenses [CC]   4976 I discussed the case with Dr. Saleh and they agree to evaluate the patient at the bedside.    [CC]   5341 I rechecked the patient.  I discussed the patient's labs, radiology findings (including all incidental findings), diagnosis, and plan for discharge.  A repeat exam reveals no new worrisome changes from my initial exam findings.  The patient understands that the fact that they are being discharged does not denote that nothing is abnormal, it indicates that no clinical emergency is present and that they must follow-up as directed in order to properly maintain their health.  Follow-up instructions (specifically listed below) and return to ER precautions were given at this time.  I specifically instructed the patient to follow-up with their PCP.  The patient understands  and agrees with the plan, and is ready for discharge.  All questions answered.   [CC]      ED Course User Index  [CC] Quyen Sharp PA-C       MDM: 63-year-old female presenting for evaluation of eye irritation after getting hair dye in the eye last night.  Patient's visual acuity is unaffected at this time.  Patient's symptoms are improved.  Patient will be instructed on supportive care measures and return precautions and discharged in stable condition      COMPLEXITY OF CARE  Admission was considered but after careful review of the patient's presentation, physical examination, diagnostic results, and response to treatment the patient may be safely discharged with outpatient follow-up.    Please note that portions of this document were completed with a voice recognition program.    Note Disclaimer: At [facility], we believe that sharing information builds trust and better relationships. You are receiving this note because you recently visited [facility]. It is possible you will see health information before a provider has talked with you about it. This kind of information can be easy to misunderstand. To help you fully understand what it means for your health, we urge you to discuss this note with your provider.         Chuck Saleh MD  01/23/24 1425       Chuck Saleh MD  01/24/24 3646

## 2024-01-23 NOTE — ED PROVIDER NOTES
EMERGENCY DEPARTMENT ENCOUNTER  Room Number:  08/08  PCP: Rachael Oconnell MD  Independent Historians: Patient      HPI:  Chief Complaint: had concerns including Eye Pain.     A complete HPI/ROS/PMH/PSH/SH/FH are unobtainable due to: None    Chronic or social conditions impacting patient care (Social Determinants of Health): None      Context: Leonie Becker is a 63 y.o. female with a medical history of osteoporosis and degenerative disc disease presents emergency department with discomfort and irritation to her left eye.  Patient says she got her hair done yesterday and she was leaning over the ball to have her hair washed when something felt like it got in her eye.  She says she thought it may have been the SPF off of her face but thinks it may have been hair dye.  She says she irrigated her eye about 15 to 20 minutes after this initially occurred.  She says she irrigated again at home but it has still been irritated and she feels like her vision is not as sharp as normal.  She says she does wear glasses but no contacts. She denies pain with extraocular movements.    Review of prior external notes (non-ED) -and- Review of prior external test results outside of this encounter:    patient follows with pain management for ulnar nerve entrapment and degenerative disc disease.  She also follows with neurosurgery for this      PAST MEDICAL HISTORY  Active Ambulatory Problems     Diagnosis Date Noted    Tinnitus 02/01/2016    Atopic rhinitis 03/23/2016    Chronic neck pain 03/23/2016    Chronic interstitial cystitis 03/23/2016    Degeneration of cervical intervertebral disc 03/23/2016    Hyperlipidemia 03/23/2016    Osteoporosis 03/23/2016    Pelvic floor dysfunction 03/24/2016    Other specified hypothyroidism 02/08/2019    Ulnar neuropathy of both upper extremities 08/17/2020    Carpal tunnel syndrome of right wrist 08/17/2020    Cervical spondylosis 01/21/2020    Cubital tunnel syndrome 09/08/2020    Hyperacusis,  bilateral 2018    Osteoarthritis (arthritis due to wear and tear of joints) 2012    DDD (degenerative disc disease), lumbar 2020    Displacement of cervical intervertebral disc without myelopathy 10/25/2021    Cervical disc disorder at C5-C6 level with radiculopathy 2022    Vitamin D deficiency 2022    Chronic midline low back pain with bilateral sciatica 2023     Resolved Ambulatory Problems     Diagnosis Date Noted    Degeneration of intervertebral disc of mid-cervical region 2016    Abnormal LFTs 2016    Subclinical hypothyroidism 2019     Past Medical History:   Diagnosis Date    Abnormal blood smear     Abnormal liver function test     Allergic Seasonal    Arm pain     Arthritis     Back pain     Blood tests for routine general physical examination     Cataract Slow growing    Facial basal cell cancer     H/O bladder infections     Hand pain     Limb pain     Neck pain     Neuromuscular disorder 2019    Osteopenia Osteopenia    Pelvic pain     Rectal bleeding 2023    Rosacea          PAST SURGICAL HISTORY  Past Surgical History:   Procedure Laterality Date    COLONOSCOPY      CYSTOSCOPY      Diagnostic    KNEE SURGERY      ULNAR TUNNEL RELEASE           FAMILY HISTORY  Family History   Problem Relation Age of Onset    Hypertension Mother         benign essential    Diabetes Mother         Type II    Heart disease Mother          of heart attack (presumed)    Thyroid disease Mother     Asthma Mother     Depression Mother     Aortic aneurysm Father         Thoracic    Glaucoma Father     Vision loss Father         Glaucoma    Other Father         Dissected Aorta    Depression Sister     Thyroid disease Sister     Diabetes Sister         Type II    Vision loss Sister         Glaucoma         SOCIAL HISTORY  Social History     Socioeconomic History    Marital status: Single   Tobacco Use    Smoking status: Former     Packs/day: 0.25     Years:  2.00     Additional pack years: 0.00     Total pack years: 0.50     Types: Cigarettes     Start date: 1991     Quit date: 1993     Years since quittin.0     Passive exposure: Past    Smokeless tobacco: Never   Vaping Use    Vaping Use: Never used   Substance and Sexual Activity    Alcohol use: No    Drug use: No    Sexual activity: Not Currently         ALLERGIES  Crestor [rosuvastatin calcium] and Zoster vac recomb adjuvanted      REVIEW OF SYSTEMS  Included in HPI  All systems reviewed and negative except for those discussed in HPI.      PHYSICAL EXAM    I have reviewed the triage vital signs and nursing notes.    ED Triage Vitals   Temp Heart Rate Resp BP SpO2   24 0430 24 0430 24 0430 24 0433 240   97.6 °F (36.4 °C) 79 16 120/47 98 %      Temp src Heart Rate Source Patient Position BP Location FiO2 (%)   24 0430 -- -- -- --   Tympanic           Physical Exam  Constitutional:       General: She is not in acute distress.     Appearance: Normal appearance. She is obese.   HENT:      Head: Normocephalic and atraumatic.      Nose: Nose normal.      Mouth/Throat:      Mouth: Mucous membranes are moist.   Eyes:      General: Lids are normal. Lids are everted, no foreign bodies appreciated. No scleral icterus.        Right eye: No discharge.         Left eye: No discharge.      Extraocular Movements: Extraocular movements intact.      Pupils: Pupils are equal, round, and reactive to light.      Right eye: No corneal abrasion or fluorescein uptake.      Left eye: No corneal abrasion or fluorescein uptake.      Comments: Vision grossly intact.  No observed foreign body, lids were everted and swept.   Cardiovascular:      Rate and Rhythm: Normal rate and regular rhythm.      Pulses: Normal pulses.      Heart sounds: Normal heart sounds.   Pulmonary:      Effort: Pulmonary effort is normal. No respiratory distress.      Breath sounds: Normal breath sounds.    Musculoskeletal:         General: Normal range of motion.      Cervical back: Normal range of motion and neck supple.   Skin:     General: Skin is warm and dry.      Capillary Refill: Capillary refill takes less than 2 seconds.   Neurological:      General: No focal deficit present.      Mental Status: She is alert and oriented to person, place, and time.   Psychiatric:         Mood and Affect: Mood normal.         Behavior: Behavior normal.             MEDICATIONS GIVEN IN ER  Medications   proparacaine (ALCAINE) 0.5 % ophthalmic solution 1 drop (1 drop Left Eye Given 1/23/24 0601)   fluorescein ophthalmic strip 1 strip (1 strip Both Eyes Given 1/23/24 0601)           OUTPATIENT MEDICATION MANAGEMENT:  No current Epic-ordered facility-administered medications on file.     Current Outpatient Medications Ordered in Epic   Medication Sig Dispense Refill    baclofen (LIORESAL) 10 MG tablet Take by mouth 3 (three) times a day as needed. 1 to 2 tablets      clobetasol (TEMOVATE) 0.05 % ointment clobetasol 0.05 % topical ointment   APPLY THIN LAYER THREE TIMES DAILY FOR 3 WEEKS THEN ONCE DAILY UNTIL SKIN LOOKS AND FEELS NORMAL THEN USE TWICE WEEKLY      diazepam (VALIUM) 5 MG tablet Take 1 tablet by mouth At Night As Needed.      Estriol powder       fluocinolone acetonide (DERMOTIC) 0.01 % oil otic oil Administer 5 drops into both ears 2 (Two) Times a Day.      Hormone Cream Base cream       Hydrocortisone, Perianal, (Anusol-HC) 2.5 % rectal cream Apply to hemorrhoids 3 times daily for 7 days during hemorrhoid flare. Include applicator. Use for 7 days, then take a break for 7 days before resuming this pattern. 30 g 1    Ivermectin powder       levothyroxine (SYNTHROID, LEVOTHROID) 25 MCG tablet Take 1 tablet by mouth Daily. (Patient not taking: Reported on 10/2/2023) 90 tablet 3    lidocaine (XYLOCAINE) 5 % ointment Apply 1 application  topically to the appropriate area as directed Every 2 (Two) Hours As Needed for  Moderate Pain. 30 g 0    metroNIDAZOLE (METROCREAM) 0.75 % cream Apply 1 application  topically to the appropriate area as directed 2 (Two) Times a Day.      nortriptyline (PAMELOR) 10 MG capsule Take 1 capsule by mouth Every Night.      Omega-3 Fatty Acids (FISH OIL PO) Take by mouth.      oxyCODONE (ROXICODONE) 5 MG immediate release tablet Take 1 to 2 tablets every 4 to 6 hours as needed for pain      PREBIOTIC PRODUCT PO Take  by mouth.      Probiotic Product (PROBIOTIC ADVANCED PO) Take  by mouth.      Progesterone Micronized (PROGESTERONE, BULK,) powder Place on the skin.        promethazine (PHENERGAN) 12.5 MG tablet Take 1 tablet by mouth Every 6 (Six) Hours As Needed.      traMADol (ULTRAM) 50 MG tablet Take 1 tablet by mouth 2 (Two) Times a Day As Needed.             PROGRESS, DATA ANALYSIS, CONSULTS, AND MEDICAL DECISION MAKING  Differential diagnosis includes but is not limited to:   Corneal abrasion, corneal ulcer, iritis, uveitis    ED Course:  ED Course as of 01/23/24 2357   Tue Jan 23, 2024   0507 Visual Assessment  Visual Acuity-Left: 20/20; with corrective lenses  Visual Acuity-Right: 20/20; with corrective lenses  Visual Acuity-Bilateral: 20/20; with corrective lenses [CC]   7067 I discussed the case with Dr. Saleh and they agree to evaluate the patient at the bedside.    [CC]   0541 I rechecked the patient.  I discussed the patient's labs, radiology findings (including all incidental findings), diagnosis, and plan for discharge.  A repeat exam reveals no new worrisome changes from my initial exam findings.  The patient understands that the fact that they are being discharged does not denote that nothing is abnormal, it indicates that no clinical emergency is present and that they must follow-up as directed in order to properly maintain their health.  Follow-up instructions (specifically listed below) and return to ER precautions were given at this time.  I specifically instructed the patient to  follow-up with their PCP.  The patient understands and agrees with the plan, and is ready for discharge.  All questions answered.   [CC]      ED Course User Index  [CC] Quyen Sharp PA-C           AS OF 21:29 EST VITALS:    BP - 120/47  HR - 79  TEMP - 97.6 °F (36.4 °C) (Tympanic)  O2 SATS - 98%      MDM:  Patient is a well-appearing 63-year-old female presents emergency department today with discomfort to her left eye that occurred after she got hair dye in her eye during her appointment today.  Patient did rinse the eye following the incident but says she still had discomfort through the night and it caused her to become scared which is why she presented to the ED today.  On arrival, vitals reassuring, she is afebrile.  My exam the patient has some mild irritation and erythema to the medial duct of the left eye near the lacrimal duct.  There is no significant erythema or edema.  Under fluorescein exam the cornea just appears to be diffusely irritated but there is no large corneal abrasion or ulcer.  Visual acuity is normal.  Patient does not wear contacts but does wear glasses.  Suspect that she is just having some irritation from the diluted hair dye that was in her eye but vision is grossly intact and she has no pain with extraocular movements.  I encouraged patient to call her ophthalmologist first thing in the morning for follow-up and dedicated eye exam.  Encourage patient to use lubricating eyedrops to keep the eye moist to help with the discomfort.  Patient is appropriate for discharge with outpatient follow-up.          DIAGNOSIS  Final diagnoses:   Corneal irritation of left eye         DISPOSITION  ED Disposition       ED Disposition   Discharge    Condition   Good    Comment   --                  Please note that portions of this document were completed with a voice recognition program.    Note Disclaimer: At [facility], we believe that sharing information builds trust and better relationships.  You are receiving this note because you recently visited [facility]. It is possible you will see health information before a provider has talked with you about it. This kind of information can be easy to misunderstand. To help you fully understand what it means for your health, we urge you to discuss this note with your provider.     Quyen Sharp PA-C  01/23/24 1128

## 2024-01-23 NOTE — DISCHARGE INSTRUCTIONS
Please follow-up with your PCP and opthamologist      Although you are being discharged in the ED today, I encouraged her to return for worsening symptoms.  Things can, and do, change such a treatment at home with medication may not be adequate.  Specifically I recommend returning for chest pain or discomfort, difficulty breathing, persistent vomiting or difficulty holding down liquids or medications, fever > 102.0 F,  or any other worsening or alarming symptoms.       You have been evaluated in the emergency department for your presenting symptoms and deemed appropriate for discharge home.  Understand that your health care does not end here today.  It is important that your primary care physician be made aware of your visit.  I recommend calling your primary care provider in the next 48 hours to arrange follow-up care.  Your primary care provider needs to review your treatment and recovery to ensure that no further treatment is necessary.  Additional testing or procedures may be necessary as an outpatient at the discretion of your primary care provider.    I also recommend following up with your PCP for recheck of your blood pressure and treatment as needed.

## 2024-02-16 ENCOUNTER — OFFICE VISIT (OUTPATIENT)
Dept: SURGERY | Facility: CLINIC | Age: 64
End: 2024-02-16
Payer: MEDICARE

## 2024-02-16 VITALS
DIASTOLIC BLOOD PRESSURE: 70 MMHG | HEIGHT: 63 IN | WEIGHT: 108 LBS | SYSTOLIC BLOOD PRESSURE: 110 MMHG | BODY MASS INDEX: 19.14 KG/M2 | HEART RATE: 58 BPM | OXYGEN SATURATION: 99 %

## 2024-02-16 DIAGNOSIS — K64.4 EXTERNAL HEMORRHOIDS WITH COMPLICATION: Primary | ICD-10-CM

## 2024-02-16 RX ORDER — 1.85% HYDROCORTISONE ACETATE - 1.15% PRAMOXINE HCI CREAM 18.5; 11.5 MG/G; MG/G
CREAM TOPICAL
COMMUNITY

## 2024-03-18 DIAGNOSIS — E03.8 OTHER SPECIFIED HYPOTHYROIDISM: ICD-10-CM

## 2024-03-18 DIAGNOSIS — E78.5 HYPERLIPIDEMIA, UNSPECIFIED HYPERLIPIDEMIA TYPE: Primary | ICD-10-CM

## 2024-03-18 DIAGNOSIS — E55.9 VITAMIN D DEFICIENCY: ICD-10-CM

## 2024-03-18 LAB
25(OH)D3+25(OH)D2 SERPL-MCNC: 42.5 NG/ML (ref 30–100)
ALBUMIN SERPL-MCNC: 4.4 G/DL (ref 3.5–5.2)
ALBUMIN/GLOB SERPL: 1.8 G/DL
ALP SERPL-CCNC: 105 U/L (ref 39–117)
ALT SERPL-CCNC: 37 U/L (ref 1–33)
APPEARANCE UR: ABNORMAL
AST SERPL-CCNC: 37 U/L (ref 1–32)
BACTERIA #/AREA URNS HPF: ABNORMAL /HPF
BASOPHILS # BLD AUTO: 0.07 10*3/MM3 (ref 0–0.2)
BASOPHILS NFR BLD AUTO: 1.3 % (ref 0–1.5)
BILIRUB SERPL-MCNC: 0.2 MG/DL (ref 0–1.2)
BILIRUB UR QL STRIP: NEGATIVE
BUN SERPL-MCNC: 19 MG/DL (ref 8–23)
BUN/CREAT SERPL: 26.4 (ref 7–25)
CALCIUM SERPL-MCNC: 9.4 MG/DL (ref 8.6–10.5)
CASTS URNS MICRO: ABNORMAL
CHLORIDE SERPL-SCNC: 105 MMOL/L (ref 98–107)
CHOLEST SERPL-MCNC: 259 MG/DL (ref 0–200)
CO2 SERPL-SCNC: 25.1 MMOL/L (ref 22–29)
COLOR UR: YELLOW
CREAT SERPL-MCNC: 0.72 MG/DL (ref 0.57–1)
EGFRCR SERPLBLD CKD-EPI 2021: 92.9 ML/MIN/1.73
EOSINOPHIL # BLD AUTO: 0.26 10*3/MM3 (ref 0–0.4)
EOSINOPHIL NFR BLD AUTO: 4.8 % (ref 0.3–6.2)
EPI CELLS #/AREA URNS HPF: ABNORMAL /HPF
ERYTHROCYTE [DISTWIDTH] IN BLOOD BY AUTOMATED COUNT: 12 % (ref 12.3–15.4)
GLOBULIN SER CALC-MCNC: 2.5 GM/DL
GLUCOSE SERPL-MCNC: 79 MG/DL (ref 65–99)
GLUCOSE UR QL STRIP: NEGATIVE
HCT VFR BLD AUTO: 43.3 % (ref 34–46.6)
HDLC SERPL-MCNC: 97 MG/DL (ref 40–60)
HGB BLD-MCNC: 14.1 G/DL (ref 12–15.9)
HGB UR QL STRIP: ABNORMAL
IMM GRANULOCYTES # BLD AUTO: 0.01 10*3/MM3 (ref 0–0.05)
IMM GRANULOCYTES NFR BLD AUTO: 0.2 % (ref 0–0.5)
KETONES UR QL STRIP: NEGATIVE
LDLC SERPL CALC-MCNC: 153 MG/DL (ref 0–100)
LEUKOCYTE ESTERASE UR QL STRIP: ABNORMAL
LYMPHOCYTES # BLD AUTO: 1.46 10*3/MM3 (ref 0.7–3.1)
LYMPHOCYTES NFR BLD AUTO: 26.7 % (ref 19.6–45.3)
MCH RBC QN AUTO: 29.3 PG (ref 26.6–33)
MCHC RBC AUTO-ENTMCNC: 32.6 G/DL (ref 31.5–35.7)
MCV RBC AUTO: 89.8 FL (ref 79–97)
MONOCYTES # BLD AUTO: 0.51 10*3/MM3 (ref 0.1–0.9)
MONOCYTES NFR BLD AUTO: 9.3 % (ref 5–12)
NEUTROPHILS # BLD AUTO: 3.15 10*3/MM3 (ref 1.7–7)
NEUTROPHILS NFR BLD AUTO: 57.7 % (ref 42.7–76)
NITRITE UR QL STRIP: NEGATIVE
NRBC BLD AUTO-RTO: 0 /100 WBC (ref 0–0.2)
PH UR STRIP: 5.5 [PH] (ref 5–8)
PLATELET # BLD AUTO: 252 10*3/MM3 (ref 140–450)
POTASSIUM SERPL-SCNC: 4.3 MMOL/L (ref 3.5–5.2)
PROT SERPL-MCNC: 6.9 G/DL (ref 6–8.5)
PROT UR QL STRIP: NEGATIVE
RBC # BLD AUTO: 4.82 10*6/MM3 (ref 3.77–5.28)
RBC #/AREA URNS HPF: ABNORMAL /HPF
SODIUM SERPL-SCNC: 141 MMOL/L (ref 136–145)
SP GR UR STRIP: 1.02 (ref 1–1.03)
T4 FREE SERPL-MCNC: 1.15 NG/DL (ref 0.93–1.7)
TRIGL SERPL-MCNC: 60 MG/DL (ref 0–150)
TSH SERPL DL<=0.005 MIU/L-ACNC: 7.23 UIU/ML (ref 0.27–4.2)
UROBILINOGEN UR STRIP-MCNC: ABNORMAL MG/DL
VLDLC SERPL CALC-MCNC: 9 MG/DL (ref 5–40)
WBC # BLD AUTO: 5.46 10*3/MM3 (ref 3.4–10.8)
WBC #/AREA URNS HPF: ABNORMAL /HPF

## 2024-03-25 ENCOUNTER — OFFICE VISIT (OUTPATIENT)
Dept: INTERNAL MEDICINE | Facility: CLINIC | Age: 64
End: 2024-03-25
Payer: MEDICARE

## 2024-03-25 VITALS
BODY MASS INDEX: 18.96 KG/M2 | OXYGEN SATURATION: 98 % | DIASTOLIC BLOOD PRESSURE: 70 MMHG | HEART RATE: 60 BPM | SYSTOLIC BLOOD PRESSURE: 100 MMHG | WEIGHT: 107 LBS | HEIGHT: 63 IN

## 2024-03-25 DIAGNOSIS — Z00.00 MEDICARE ANNUAL WELLNESS VISIT, SUBSEQUENT: Primary | ICD-10-CM

## 2024-03-25 DIAGNOSIS — E03.8 SUBCLINICAL HYPOTHYROIDISM: ICD-10-CM

## 2024-03-25 DIAGNOSIS — M81.0 OSTEOPOROSIS, UNSPECIFIED OSTEOPOROSIS TYPE, UNSPECIFIED PATHOLOGICAL FRACTURE PRESENCE: ICD-10-CM

## 2024-03-25 DIAGNOSIS — E78.5 HYPERLIPIDEMIA, UNSPECIFIED HYPERLIPIDEMIA TYPE: ICD-10-CM

## 2024-03-25 NOTE — PROGRESS NOTES
The ABCs of the Annual Wellness Visit  Subsequent Medicare Wellness Visit    Subjective    Leonie Becker is a 63 y.o. female who presents for a Subsequent Medicare Wellness Visit.    The following portions of the patient's history were reviewed and   updated as appropriate: allergies, current medications, past family history, past medical history, past social history, past surgical history, and problem list.    Compared to one year ago, the patient feels her physical   health is the same.    Compared to one year ago, the patient feels her mental   health is the same.    Recent Hospitalizations:  She was not admitted to the hospital during the last year.       Current Medical Providers:  Patient Care Team:  Rachael Oconnell MD as PCP - General (Internal Medicine)  Maci Jennings MD as Consulting Physician (Obstetrics and Gynecology)  Yolie Saleh PA-C as Physician Assistant (Physician Assistant)    Outpatient Medications Prior to Visit   Medication Sig Dispense Refill    baclofen (LIORESAL) 10 MG tablet Take by mouth 3 (three) times a day as needed. 1 to 2 tablets      clobetasol (TEMOVATE) 0.05 % ointment clobetasol 0.05 % topical ointment   APPLY THIN LAYER THREE TIMES DAILY FOR 3 WEEKS THEN ONCE DAILY UNTIL SKIN LOOKS AND FEELS NORMAL THEN USE TWICE WEEKLY      diazepam (VALIUM) 5 MG tablet Take 1 tablet by mouth At Night As Needed.      Estriol powder       fluocinolone acetonide (DERMOTIC) 0.01 % oil otic oil Administer 5 drops into both ears 2 (Two) Times a Day.      Hormone Cream Base cream       Hydrocortisone, Perianal, (Anusol-HC) 2.5 % rectal cream Apply to hemorrhoids 3 times daily for 7 days during hemorrhoid flare. Include applicator. Use for 7 days, then take a break for 7 days before resuming this pattern. 30 g 1    Ivermectin powder       lidocaine (XYLOCAINE) 5 % ointment Apply 1 application  topically to the appropriate area as directed Every 2 (Two) Hours As Needed for Moderate Pain. 30 g  0    metroNIDAZOLE (METROCREAM) 0.75 % cream Apply 1 Application topically to the appropriate area as directed 2 (Two) Times a Day.      nortriptyline (PAMELOR) 10 MG capsule Take 1 capsule by mouth Every Night.      Omega-3 Fatty Acids (FISH OIL PO) Take by mouth.      oxyCODONE (ROXICODONE) 5 MG immediate release tablet Take 1 to 2 tablets every 4 to 6 hours as needed for pain      PREBIOTIC PRODUCT PO Take  by mouth.      Probiotic Product (PROBIOTIC ADVANCED PO) Take  by mouth.      Progesterone Micronized (PROGESTERONE, BULK,) powder Place on the skin.        promethazine (PHENERGAN) 12.5 MG tablet Take 1 tablet by mouth Every 6 (Six) Hours As Needed.      traMADol (ULTRAM) 50 MG tablet Take 1 tablet by mouth 2 (Two) Times a Day As Needed.      Hydrocortisone Ace-Pramoxine (ProCort) 1.85-1.15 % cream APPLY 1 INCH TOPICALLY TO THE AFFECTED AREA EVERY 6 HOURS      levothyroxine (SYNTHROID, LEVOTHROID) 25 MCG tablet Take 1 tablet by mouth Daily. (Patient not taking: Reported on 10/2/2023) 90 tablet 3     No facility-administered medications prior to visit.       Opioid medication/s are on active medication list.  and I have evaluated her active treatment plan and pain score trends (see table).  Vitals:    03/25/24 1115   PainSc: 0-No pain     I have reviewed the chart for potential of high risk medication and harmful drug interactions in the elderly.          Aspirin is not on active medication list.  Aspirin use is not indicated based on review of current medical condition/s. Risk of harm outweighs potential benefits.  .    Patient Active Problem List   Diagnosis    Tinnitus    Atopic rhinitis    Chronic neck pain    Chronic interstitial cystitis    Degeneration of cervical intervertebral disc    Hyperlipidemia    Osteoporosis    Pelvic floor dysfunction    Other specified hypothyroidism    Ulnar neuropathy of both upper extremities    Carpal tunnel syndrome of right wrist    Cervical spondylosis    Cubital  "tunnel syndrome    Hyperacusis, bilateral    Osteoarthritis (arthritis due to wear and tear of joints)    DDD (degenerative disc disease), lumbar    Displacement of cervical intervertebral disc without myelopathy    Cervical disc disorder at C5-C6 level with radiculopathy    Vitamin D deficiency    Chronic midline low back pain with bilateral sciatica     Advance Care Planning   Advance Care Planning     Advance Directive is on file.  ACP discussion was held with the patient during this visit. Patient has an advance directive in EMR which is still valid.      Objective    Vitals:    24 1115   BP: 100/70   Pulse: 60   SpO2: 98%   Weight: 48.5 kg (107 lb)   Height: 160 cm (62.99\")   PainSc: 0-No pain     Estimated body mass index is 18.96 kg/m² as calculated from the following:    Height as of this encounter: 160 cm (62.99\").    Weight as of this encounter: 48.5 kg (107 lb).    BMI is within normal parameters. No other follow-up for BMI required.      Does the patient have evidence of cognitive impairment? No    Lab Results   Component Value Date    CHLPL 259 (H) 2024    TRIG 60 2024    HDL 97 (H) 2024     (H) 2024    VLDL 9 2024        HEALTH RISK ASSESSMENT    Smoking Status:  Social History     Tobacco Use   Smoking Status Former    Current packs/day: 0.00    Average packs/day: 0.3 packs/day for 2.0 years (0.5 ttl pk-yrs)    Types: Cigarettes    Start date: 1991    Quit date: 1993    Years since quittin.2    Passive exposure: Past   Smokeless Tobacco Never     Alcohol Consumption:  Social History     Substance and Sexual Activity   Alcohol Use No     Fall Risk Screen:    STEADI Fall Risk Assessment was completed, and patient is at LOW risk for falls.Assessment completed on:3/25/2024    Depression Screening:      3/25/2024    11:13 AM   PHQ-2/PHQ-9 Depression Screening   Little Interest or Pleasure in Doing Things 0-->not at all   Feeling Down, Depressed or " Hopeless 0-->not at all   PHQ-9: Brief Depression Severity Measure Score 0       Health Habits and Functional and Cognitive Screening:      3/25/2024    11:13 AM   Functional & Cognitive Status   Do you have difficulty preparing food and eating? No   Do you have difficulty bathing yourself, getting dressed or grooming yourself? No   Do you have difficulty using the toilet? No   Do you have difficulty moving around from place to place? No   Do you have trouble with steps or getting out of a bed or a chair? No   Current Diet Well Balanced Diet   Dental Exam Up to date   Eye Exam Up to date   Exercise (times per week) 2 times per week   Current Exercises Include Light Weights   Do you need help using the phone?  No   Are you deaf or do you have serious difficulty hearing?  No   Do you need help to go to places out of walking distance? No   Do you need help shopping? No   Do you need help preparing meals?  No   Do you need help with housework?  No   Do you need help with laundry? No   Do you need help taking your medications? No   Do you need help managing money? No   Do you ever drive or ride in a car without wearing a seat belt? No   Have you felt unusual stress, anger or loneliness in the last month? No   Who do you live with? Other   If you need help, do you have trouble finding someone available to you? No   Have you been bothered in the last four weeks by sexual problems? No   Do you have difficulty concentrating, remembering or making decisions? No       Age-appropriate Screening Schedule:  Refer to the list below for future screening recommendations based on patient's age, sex and/or medical conditions. Orders for these recommended tests are listed in the plan section. The patient has been provided with a written plan.    Health Maintenance   Topic Date Due    RSV Vaccine - Adults (1 - 1-dose 60+ series) Never done    Pneumococcal Vaccine 65+ (1 of 1 - PCV) Never done    COVID-19 Vaccine (5 - 2023-24 season)  "09/01/2023    PAP SMEAR  03/01/2024    ANNUAL WELLNESS VISIT  03/21/2024    COLORECTAL CANCER SCREENING  09/10/2024    LIPID PANEL  03/18/2025    DXA SCAN  06/08/2025    MAMMOGRAM  02/27/2026    TDAP/TD VACCINES (4 - Td or Tdap) 04/24/2027    HEPATITIS C SCREENING  Completed    INFLUENZA VACCINE  Discontinued                  CMS Preventative Services Quick Reference  Risk Factors Identified During Encounter  None Identified  The above risks/problems have been discussed with the patient.  Pertinent information has been shared with the patient in the After Visit Summary.  An After Visit Summary and PPPS were made available to the patient.    Follow Up:   Next Medicare Wellness visit to be scheduled in 1 year.       Additional E&M Note during same encounter follows:  Patient has multiple medical problems which are significant and separately identifiable that require additional work above and beyond the Medicare Wellness Visit.      Chief Complaint  Medicare Wellness-subsequent    Subjective        HPI  Leonie R Fears is also being seen today for     HLD.  Cholesterol is elevated.  We discussed diet.   Subclinical hypothyroidism.  She has been unable to tolerate levothyroxine.    OP.  She is on calcium and d.     Review of Systems   Respiratory: Negative.     Cardiovascular: Negative.        Objective   Vital Signs:  /70   Pulse 60   Ht 160 cm (62.99\")   Wt 48.5 kg (107 lb)   SpO2 98%   BMI 18.96 kg/m²     Physical Exam  Constitutional:       Appearance: She is well-developed.   HENT:      Head: Normocephalic and atraumatic.      Right Ear: Hearing, tympanic membrane and external ear normal.      Left Ear: Hearing, tympanic membrane and external ear normal.      Nose: Nose normal.   Neck:      Thyroid: No thyromegaly.   Cardiovascular:      Rate and Rhythm: Normal rate and regular rhythm.      Heart sounds: Normal heart sounds. No murmur heard.  Pulmonary:      Effort: Pulmonary effort is normal.      Breath " sounds: Normal breath sounds.   Chest:   Breasts:     Right: Normal.      Left: Normal.   Abdominal:      General: There is no distension.      Palpations: Abdomen is soft.      Tenderness: There is no abdominal tenderness.   Musculoskeletal:      Cervical back: Neck supple.   Lymphadenopathy:      Cervical: No cervical adenopathy.   Skin:     General: Skin is warm and dry.   Neurological:      Mental Status: She is alert and oriented to person, place, and time.   Psychiatric:         Speech: Speech normal.         Behavior: Behavior normal.         Thought Content: Thought content normal.         Judgment: Judgment normal.          The following data was reviewed by: Rachael Oconnell MD on 03/25/2024:  Common labs          6/13/2023    10:02 9/26/2023    10:04 3/18/2024    09:40   Common Labs   Glucose 89  98  79    BUN 17  18  19    Creatinine 0.86  1.13  0.72    Sodium 140  142  141    Potassium 4.3  4.3  4.3    Chloride 106  105  105    Calcium 9.6  9.9  9.4    Total Protein 6.5  7.1  6.9    Albumin 4.3  4.6  4.4    Total Bilirubin <0.2  0.3  0.2    Alkaline Phosphatase 68  82  105    AST (SGOT) 24  25  37    ALT (SGPT) 23  22  37    WBC 4.16  4.19  5.46    Hemoglobin 13.4  13.6  14.1    Hematocrit 41.8  41.8  43.3    Platelets 235  250  252    Total Cholesterol 237  258  259    Triglycerides 44  113  60    HDL Cholesterol 82  69  97    LDL Cholesterol  148  169  153                 Assessment and Plan   Diagnoses and all orders for this visit:    1. Medicare annual wellness visit, subsequent (Primary)    2. Hyperlipidemia, unspecified hyperlipidemia type    3. Other specified hypothyroidism    4. Osteoporosis, unspecified osteoporosis type, unspecified pathological fracture presence    HLD.  We discussed diet.  I recommend a diet high in fruits, vegetables, whole grains, lean meats, nuts and beans.  I recommend limiting red meat, full fat dairy, eggs and processed white carbohydrates.  I recommend aerobic  exercise at least 3 days per week.   Subclinical hypothyroidism.  She is maintaining in subclinical range.  Continue to monitor.    OP.  I recommend to get 1200 mg of calcium and 1000 IUs of vitamin D through diet and supplements and to participate in a weight based exercise to prevent loss of bone mineral density. Bone mineral will be monitored every two years.  She is on drug holiday from bisphosphonates.  Consider Prolia versus restart of bisphosphonates after next BMD.         Follow Up   No follow-ups on file.  Patient was given instructions and counseling regarding her condition or for health maintenance advice. Please see specific information pulled into the AVS if appropriate.

## 2024-03-25 NOTE — PATIENT INSTRUCTIONS
Medicare Wellness  Personal Prevention Plan of Service     Date of Office Visit:    Encounter Provider:  Rachael Oconnell MD  Place of Service:  [facility]  Patient Name: Leonie Becker  :  1960    As part of the Medicare Wellness portion of your visit today, we are providing you with this personalized preventive plan of services (PPPS). This plan is based upon recommendations of the [place] Preventive Services Task Force (USPSTF) and the Advisory Committee on Immunization Practices (ACIP).    This lists the preventive care services that should be considered, and provides dates of when you are due. Items listed as completed are up-to-date and do not require any further intervention.    Health Maintenance   Topic Date Due    RSV Vaccine - Adults (1 - 1-dose 60+ series) Never done    Pneumococcal Vaccine 65+ (1 of 1 - PCV) Never done    COVID-19 Vaccine ( - - season) 2023    PAP SMEAR  2024    ANNUAL WELLNESS VISIT  2024    COLORECTAL CANCER SCREENING  09/10/2024    LIPID PANEL  2025    DXA SCAN  2025    MAMMOGRAM  2026    TDAP/TD VACCINES (4 - Td or Tdap) 2027    HEPATITIS C SCREENING  Completed    INFLUENZA VACCINE  Discontinued       No orders of the defined types were placed in this encounter.      No follow-ups on file.

## 2024-03-28 NOTE — PROGRESS NOTES
"Subjective   Patient ID: Leonie Becker is a 63 y.o. female is here today for follow-up on lumbar and neck pain    Since I last saw her the left ulnar problem has seemed to have gotten better.  She has gotten into a regular exercise program at [facility] and does seem to be helping.  In fact she played her guitar for about 20 minutes the other day which she has not been able to do.  It has been over 3 years since she had her ulnar decompression.  A transposition as a revision operation has been suggested by 2 other hand surgeons but she has not done that because it spontaneously got better.  The neck is doing reasonably well as is the low back although she is not totally free of pain.  But she thinks the exercise seems to help.  She is developed some shinsplints recently from walking too much.  Will continue to follow her for her cervical disc problem and her degenerative disc disease in the lumbar spine.  Will see her in 9 months, sooner if there is a bad flareup.        History of Present Illness    The following portions of the patient's history were reviewed and updated as appropriate: allergies, current medications, past family history, past medical history, past social history, past surgical history, and problem list.    Review of Systems   Constitutional:  Negative for fever.   Musculoskeletal:  Positive for back pain, neck pain and neck stiffness. Negative for gait problem.   Neurological:  Negative for weakness and numbness.   All other systems reviewed and are negative.          Objective     Vitals:    04/01/24 0913   BP: 112/68   BP Location: Left arm   Patient Position: Sitting   Cuff Size: Adult   Resp: 20   Weight: 48.5 kg (107 lb)   Height: 160 cm (62.99\")   PainSc:   2     Body mass index is 18.96 kg/m².    Tobacco Use: Medium Risk (4/1/2024)    Patient History     Smoking Tobacco Use: Former     Smokeless Tobacco Use: Never     Passive Exposure: Past          Physical Exam  Constitutional:       " Appearance: She is well-developed.   HENT:      Head: Normocephalic and atraumatic.   Eyes:      Extraocular Movements: EOM normal.      Conjunctiva/sclera: Conjunctivae normal.      Pupils: Pupils are equal, round, and reactive to light.   Neck:      Vascular: No carotid bruit.   Neurological:      Mental Status: She is oriented to person, place, and time.      Coordination: Finger-Nose-Finger Test and Heel to Shin Test normal.      Gait: Gait is intact.      Deep Tendon Reflexes:      Reflex Scores:       Tricep reflexes are 2+ on the right side and 2+ on the left side.       Bicep reflexes are 2+ on the right side and 2+ on the left side.       Brachioradialis reflexes are 2+ on the right side and 2+ on the left side.       Patellar reflexes are 2+ on the right side and 2+ on the left side.       Achilles reflexes are 2+ on the right side and 2+ on the left side.  Psychiatric:         Speech: Speech normal.       Neurologic Exam     Mental Status   Oriented to person, place, and time.   Registration of memory: Good recent and remote memory.   Attention: normal. Concentration: normal.   Speech: speech is normal   Level of consciousness: alert  Knowledge: consistent with education.     Cranial Nerves     CN II   Visual fields full to confrontation.   Visual acuity: normal    CN III, IV, VI   Pupils are equal, round, and reactive to light.  Extraocular motions are normal.     CN V   Facial sensation intact.   Right corneal reflex: normal  Left corneal reflex: normal    CN VII   Facial expression full, symmetric.   Right facial weakness: none  Left facial weakness: none    CN VIII   Hearing: intact    CN IX, X   Palate: symmetric    CN XI   Right sternocleidomastoid strength: normal  Left sternocleidomastoid strength: normal    CN XII   Tongue: not atrophic  Tongue deviation: none    Motor Exam   Muscle bulk: normal  Right arm tone: normal  Left arm tone: normal  Right leg tone: normal  Left leg tone:  normal    Strength   Strength 5/5 except as noted.     Sensory Exam   Light touch normal.     Gait, Coordination, and Reflexes     Gait  Gait: normal    Coordination   Finger to nose coordination: normal  Heel to shin coordination: normal    Reflexes   Right brachioradialis: 2+  Left brachioradialis: 2+  Right biceps: 2+  Left biceps: 2+  Right triceps: 2+  Left triceps: 2+  Right patellar: 2+  Left patellar: 2+  Right achilles: 2+  Left achilles: 2+  Right : 2+  Left : 2+          Assessment & Plan   Independent Review of Radiographic Studies:      I personally reviewed the images from the following studies.    The lumbar x-rays done on 3/28/2023 do not show any spondylolisthesis or significant disc base collapse.  Rather unremarkable.  Agree with the report.       Medical Decision Making:      [facility] since they seem to be helping her.  Will see her in about 9 months, sooner if there is a bad flareup.        Diagnoses and all orders for this visit:    1. Chronic neck pain (Primary)    2. Cervical disc disorder at C5-C6 level with radiculopathy    3. Ulnar neuropathy of both upper extremities    4. Carpal tunnel syndrome of right wrist    5. Chronic midline low back pain with bilateral sciatica    6. DDD (degenerative disc disease), lumbar      Return in about 9 months (around 1/1/2025) for face to face.

## 2024-04-01 ENCOUNTER — OFFICE VISIT (OUTPATIENT)
Dept: NEUROSURGERY | Facility: CLINIC | Age: 64
End: 2024-04-01
Payer: MEDICARE

## 2024-04-01 VITALS
SYSTOLIC BLOOD PRESSURE: 112 MMHG | HEIGHT: 63 IN | RESPIRATION RATE: 20 BRPM | BODY MASS INDEX: 18.96 KG/M2 | DIASTOLIC BLOOD PRESSURE: 68 MMHG | WEIGHT: 107 LBS

## 2024-04-01 DIAGNOSIS — M54.41 CHRONIC MIDLINE LOW BACK PAIN WITH BILATERAL SCIATICA: ICD-10-CM

## 2024-04-01 DIAGNOSIS — M54.42 CHRONIC MIDLINE LOW BACK PAIN WITH BILATERAL SCIATICA: ICD-10-CM

## 2024-04-01 DIAGNOSIS — G89.29 CHRONIC NECK PAIN: Primary | ICD-10-CM

## 2024-04-01 DIAGNOSIS — G56.01 CARPAL TUNNEL SYNDROME OF RIGHT WRIST: ICD-10-CM

## 2024-04-01 DIAGNOSIS — M51.36 DDD (DEGENERATIVE DISC DISEASE), LUMBAR: ICD-10-CM

## 2024-04-01 DIAGNOSIS — M54.2 CHRONIC NECK PAIN: Primary | ICD-10-CM

## 2024-04-01 DIAGNOSIS — G89.29 CHRONIC MIDLINE LOW BACK PAIN WITH BILATERAL SCIATICA: ICD-10-CM

## 2024-04-01 DIAGNOSIS — M50.122 CERVICAL DISC DISORDER AT C5-C6 LEVEL WITH RADICULOPATHY: ICD-10-CM

## 2024-04-01 DIAGNOSIS — G56.23 ULNAR NEUROPATHY OF BOTH UPPER EXTREMITIES: ICD-10-CM

## 2024-04-01 PROCEDURE — 99213 OFFICE O/P EST LOW 20 MIN: CPT | Performed by: NEUROLOGICAL SURGERY

## 2024-04-01 PROCEDURE — 1159F MED LIST DOCD IN RCRD: CPT | Performed by: NEUROLOGICAL SURGERY

## 2024-04-01 PROCEDURE — 1160F RVW MEDS BY RX/DR IN RCRD: CPT | Performed by: NEUROLOGICAL SURGERY

## 2024-04-22 ENCOUNTER — TELEPHONE (OUTPATIENT)
Dept: INTERNAL MEDICINE | Facility: CLINIC | Age: 64
End: 2024-04-22
Payer: MEDICARE

## 2024-04-22 NOTE — TELEPHONE ENCOUNTER
Severe sore throat starting yesterday.  Feels very poorly.  Head and nasal congestion.  Fever is associated.  She tested positive for COVID.  I recommend attention to rest and fluids. I recommend as needed tylenol for fevers and aches.  Reasons to go to the ER include severe trouble breathing, chest pain, confusion, inability to wake or stay awake, and bluish lips or face.  Continue supportive measures and let me know if there is any change in symptoms.  FLO

## 2024-05-07 ENCOUNTER — OFFICE VISIT (OUTPATIENT)
Dept: INTERNAL MEDICINE | Facility: CLINIC | Age: 64
End: 2024-05-07
Payer: MEDICARE

## 2024-05-07 VITALS
HEART RATE: 72 BPM | BODY MASS INDEX: 18.78 KG/M2 | SYSTOLIC BLOOD PRESSURE: 100 MMHG | OXYGEN SATURATION: 96 % | DIASTOLIC BLOOD PRESSURE: 74 MMHG | WEIGHT: 106 LBS | TEMPERATURE: 98.3 F | HEIGHT: 63 IN

## 2024-05-07 DIAGNOSIS — U07.1 COVID-19 VIRUS DETECTED: Primary | ICD-10-CM

## 2024-05-07 PROCEDURE — 1160F RVW MEDS BY RX/DR IN RCRD: CPT | Performed by: INTERNAL MEDICINE

## 2024-05-07 PROCEDURE — 1159F MED LIST DOCD IN RCRD: CPT | Performed by: INTERNAL MEDICINE

## 2024-05-07 PROCEDURE — 1126F AMNT PAIN NOTED NONE PRSNT: CPT | Performed by: INTERNAL MEDICINE

## 2024-05-07 PROCEDURE — 99213 OFFICE O/P EST LOW 20 MIN: CPT | Performed by: INTERNAL MEDICINE

## 2024-06-25 ENCOUNTER — PREP FOR SURGERY (OUTPATIENT)
Dept: OTHER | Facility: HOSPITAL | Age: 64
End: 2024-06-25
Payer: MEDICARE

## 2024-06-25 DIAGNOSIS — Z12.11 SCREENING FOR COLON CANCER: Primary | ICD-10-CM

## 2024-06-26 PROBLEM — Z12.11 SCREENING FOR COLON CANCER: Status: ACTIVE | Noted: 2024-06-25

## 2024-06-26 RX ORDER — SODIUM CHLORIDE, SODIUM LACTATE, POTASSIUM CHLORIDE, CALCIUM CHLORIDE 600; 310; 30; 20 MG/100ML; MG/100ML; MG/100ML; MG/100ML
30 INJECTION, SOLUTION INTRAVENOUS CONTINUOUS
OUTPATIENT
Start: 2024-06-26

## 2024-07-15 ENCOUNTER — TELEPHONE (OUTPATIENT)
Dept: SURGERY | Facility: CLINIC | Age: 64
End: 2024-07-15
Payer: MEDICARE

## 2024-07-15 NOTE — TELEPHONE ENCOUNTER
Pt wants to know if she can go back to the old prep. They new prep has pt's stopping high fiber foods 3 days prior to cy. Pt sates she needs very specific vitamin and fiber intake due to her other medical conditions. She states that taking this prep as prescribed would essentially make her go 4 days without eating. Wants to know if she can just do liquids only day prior to colonoscopy with prep.

## 2024-07-15 NOTE — TELEPHONE ENCOUNTER
Caller: Leonie Becker    Relationship: Self    Best call back number: [phone]     What is the best time to reach you: ANYTIME    Who are you requesting to speak with (clinical staff, provider,  specific staff member): JACQUES TALAMANTES    PATIENT WOULD LIKE TO SPEAK WITH JACQUES BROWN IF POSSIBLE REGARDING A MEDICATION AND COLONOSCOPY PREP. PLEASE CALL WHEN AVAILABLE.

## 2024-07-16 ENCOUNTER — TRANSCRIBE ORDERS (OUTPATIENT)
Dept: ADMINISTRATIVE | Facility: HOSPITAL | Age: 64
End: 2024-07-16
Payer: MEDICARE

## 2024-07-16 DIAGNOSIS — M79.661 PAIN IN RIGHT LOWER LEG: Primary | ICD-10-CM

## 2024-08-02 ENCOUNTER — HOSPITAL ENCOUNTER (OUTPATIENT)
Dept: MRI IMAGING | Facility: HOSPITAL | Age: 64
Discharge: HOME | End: 2024-08-02
Payer: MEDICARE

## 2024-08-02 DIAGNOSIS — M79.661 PAIN IN RIGHT LOWER LEG: ICD-10-CM

## 2024-08-02 PROCEDURE — 73718 MRI LOWER EXTREMITY W/O DYE: CPT

## 2024-08-20 ENCOUNTER — TELEPHONE (OUTPATIENT)
Dept: SURGERY | Facility: CLINIC | Age: 64
End: 2024-08-20
Payer: MEDICARE

## 2024-08-20 NOTE — TELEPHONE ENCOUNTER
Hub staff attempted to follow warm transfer process and was unsuccessful     Caller: Leonie Becker     Relationship to patient: SELF      Best call back number: [phone]     Patient is needing:  PATIENT IS HAVING A THROMBOSED HEMORRHOID FLARE UP AND IT IS NOT RESPONDING TO ANY OF THE MEDICATION PRESCRIBED TO HER, WOULD LIKE SOMEONE TO CALL HER BACK TO DISCUSS TH.S

## 2024-08-20 NOTE — TELEPHONE ENCOUNTER
Pt saw Yolie in the past for thrombosed hem. Was told thrombus was resolving and to use 2.5% HC cream. At follow up appt, hem had reduced and Yolie told pt to use HC cream intermittently for flares, but not to use for more than 7 days.  Pt noticed on Sunday that there was a small anal nodule that she believes is another thrombus. She started using HC cream but it has not shrunk and is still sore. Pt wants to make sure she is doing everything she can so that it does not increase in size. Pt wants to know if she should continue cream or be seen in the office? If she continues cream should she use past 7 days?

## 2024-08-20 NOTE — TELEPHONE ENCOUNTER
Can use cream TID for 7-10 days. Can take sitz baths and apply OTC Recticare PRN for discomfort. Would advise not lifting >15 lbs until symptoms have improved.

## 2024-08-26 ENCOUNTER — OFFICE VISIT (OUTPATIENT)
Dept: SURGERY | Facility: CLINIC | Age: 64
End: 2024-08-26
Payer: MEDICARE

## 2024-08-26 VITALS
WEIGHT: 112.9 LBS | OXYGEN SATURATION: 100 % | BODY MASS INDEX: 20 KG/M2 | HEIGHT: 63 IN | TEMPERATURE: 98 F | HEART RATE: 64 BPM

## 2024-08-26 DIAGNOSIS — K64.4 EXTERNAL HEMORRHOIDS WITH COMPLICATION: Primary | ICD-10-CM

## 2024-08-26 PROCEDURE — 1159F MED LIST DOCD IN RCRD: CPT | Performed by: PHYSICIAN ASSISTANT

## 2024-08-26 PROCEDURE — 1160F RVW MEDS BY RX/DR IN RCRD: CPT | Performed by: PHYSICIAN ASSISTANT

## 2024-08-26 PROCEDURE — 99214 OFFICE O/P EST MOD 30 MIN: CPT | Performed by: PHYSICIAN ASSISTANT

## 2024-08-26 NOTE — PROGRESS NOTES
History of Present Illness  The patient is a 64-year-old female who presents for a follow-up evaluation of hemorrhoids. She was last seen in our office on 02/16/2024 and has a colonoscopy scheduled with Dr. Clemente on 10/30/2024.    She reports that her symptoms began after a large bowel movement, which she attributes to a high fiber diet. Despite the size of the bowel movement, she did not experience any pain. However, she noticed a small bump in her anal area while applying clobetasol for her lichen sclerosus. She attempted to treat the bump as per previous instructions for flare-ups (with topical 2.5% hydrocortisone), but it did not improve, prompting her to seek medical attention. She applied hydrocortisone 2.5 percent three times a day without using an applicator. She does not report any bleeding from the hemorrhoid. She recalls a previous episode where she nearly lost consciousness due to sudden blood loss from a hemorrhoid, which required emergency room treatment. She does not experience pain during bowel movements but did experience significant pain when she used Preparation H after a 7-day course of hydrocortisone. The bump has since started to protrude.    She is currently taking narcotics and baclofen, which can cause constipation, but she has not experienced any bowel impaction. She takes a stool softener daily and occasionally uses Senokot, which provides some relief. She is hesitant to use MiraLAX due to a recent E. 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 She mentions having chronic refractory interstitial cystitis, which is severe and requires frequent washing to prevent bladder infections. Her bowel movements have been regular, but not of the desired consistency, which she believes is due to inadequate exercise and fluid intake. She is also reluctant to exercise as it previously exacerbated her symptoms. She has stopped weight lifting and is considering swimming. She has not used  "lidocaine ointment.    She plans to call our office on Friday to update us on her symptoms. She has a medical appointment in [place], [place] on 09/12/2024 and is concerned that a 2-week follow-up may not allow enough time for improvement before her trip. She is worried about potential complications and discomfort during her travels.    Pulse 64   Temp 98 °F (36.7 °C)   Ht 160 cm (62.99\")   Wt 51.2 kg (112 lb 14.4 oz)   SpO2 100%   BMI 20.00 kg/m²   Body mass index is 20 kg/m².  -  Physical Exam  No acute distress  Chaperone present  Perianal exam: there is a 1x1x1 cm enlarged external hemorrhoid that is soft but purple in color. Perianal skin is tender to manipulation.       Assessment & Plan  1. Hemorrhoids.  Symptoms are consistent with hemorrhoids, which are not thrombosed but appear to be enlarged. The change in activity level may be impacting bowel movements. She was advised to increase water intake and consider using MiraLAX half dose daily or every other day, depending on comfort level. The frequency can be adjusted based on her body's response. Lidocaine ointment was suggested for pain relief. If these measures do not alleviate symptoms, she should contact the office via phone or [employer] message for further adjustments.    2. Medication Management.  She is currently taking a stool softener, 100 mg daily, and occasionally uses Senokot. She was advised to continue with these medications as needed to manage bowel movements.    3. Health Maintenance.  A colonoscopy is scheduled with Dr. Clemente on 10/30/2024.    Follow-up  A follow-up appointment is scheduled for 2 weeks from now.       Patient or patient representative verbalized consent for the use of Ambient Listening during the visit with  Yolie Saleh PA-C for chart documentation. 8/26/2024  11:55 EDT    Yolie Saleh PA-C  Physician Assistant  Colorectal Surgery    "

## 2024-08-30 ENCOUNTER — TELEPHONE (OUTPATIENT)
Dept: SURGERY | Facility: CLINIC | Age: 64
End: 2024-08-30

## 2024-08-30 NOTE — TELEPHONE ENCOUNTER
Hub staff attempted to follow warm transfer process and was unsuccessful     Caller: DAYANARA [employer]    Relationship to patient: SELF    Best call back number: [phone]    Patient is needing: PT WAS SEEN ON 8-26-24 AND PER NOTE WAS TO CALL BACK TODAY WITH AN UPDATE ON HER SYMPTOMS    PER NOTE ON 8-26-24:  She plans to call our office on Friday to update us on her symptoms.     PLEASE CALL PT.

## 2024-08-30 NOTE — TELEPHONE ENCOUNTER
Spoke with patient via telephone.  She is feeling better and feeling less swelling of the hemorrhoid.  She has been using Preparation H and taking Senokot every other day, having soft but formed bowel movements 3 times per day.  I advised her to continue what she is doing, as it is leading to improved symptoms.  She will keep us informed of her symptoms.

## 2024-09-09 ENCOUNTER — OFFICE VISIT (OUTPATIENT)
Dept: SURGERY | Facility: CLINIC | Age: 64
End: 2024-09-09
Payer: MEDICARE

## 2024-09-09 VITALS
WEIGHT: 106.3 LBS | HEART RATE: 65 BPM | SYSTOLIC BLOOD PRESSURE: 130 MMHG | DIASTOLIC BLOOD PRESSURE: 76 MMHG | OXYGEN SATURATION: 100 % | BODY MASS INDEX: 18.83 KG/M2

## 2024-09-09 DIAGNOSIS — K64.4 EXTERNAL HEMORRHOIDS WITH COMPLICATION: Primary | ICD-10-CM

## 2024-09-09 DIAGNOSIS — K64.8 INTERNAL HEMORRHOIDS WITH COMPLICATION: ICD-10-CM

## 2024-09-09 PROCEDURE — 1159F MED LIST DOCD IN RCRD: CPT | Performed by: PHYSICIAN ASSISTANT

## 2024-09-09 PROCEDURE — 1160F RVW MEDS BY RX/DR IN RCRD: CPT | Performed by: PHYSICIAN ASSISTANT

## 2024-09-09 PROCEDURE — 46600 DIAGNOSTIC ANOSCOPY SPX: CPT | Performed by: PHYSICIAN ASSISTANT

## 2024-09-09 PROCEDURE — 99213 OFFICE O/P EST LOW 20 MIN: CPT | Performed by: PHYSICIAN ASSISTANT

## 2024-09-09 RX ORDER — LIDOCAINE 50 MG/G
1 PATCH TOPICAL EVERY 24 HOURS
COMMUNITY

## 2024-09-09 NOTE — PROGRESS NOTES
History of Present Illness  The patient presents for evaluation of hemorrhoids.    She reports that her hemorrhoids temporarily shrink with the use of Preparation H, but may reappear if she misses an application. She also mentions a sensation of something being present in her anal canal. She has been taking Senokot every other day, resulting in daily bowel movements, sometimes loose.  She eats a diet high in fiber.  She is considering incorporating MiraLAX into her regimen. She finds that taking one Senokot pill, instead of the recommended two, helps with constipation without causing cramps, especially when combined with a high-fiber diet and adequate hydration. She plans to take a laxative before her upcoming trip to avoid bowel issues during travel. She reports no rectal bleeding.    She recalls that her 2014 colonoscopy revealed a lack of mucus in her colon, which she suspects may be contributing to her current symptoms.    /76 (BP Location: Left arm, Patient Position: Sitting, Cuff Size: Small Adult)   Pulse 65   Wt 48.2 kg (106 lb 4.8 oz)   SpO2 100%   Breastfeeding No   BMI 18.83 kg/m²   Body mass index is 18.83 kg/m².  -  Physical Exam  No acute distress  Chaperone present  Perianal exam: external hemorrhoids normal. ROSEMARIE: Tiny soft mobile nodule in left lateral position consistent with hemorrhoid, no masses. Anoscopy performed: Normal to grade 1 internal hemorrhoids x 3      Assessment & Plan  1. Hemorrhoids.  The symptoms and physical examination findings are consistent with hemorrhoids. She reports that the symptoms have improved but are not completely resolved. There is no bleeding. Continuation of laxatives, such as Senokot 1 pill every other day, is recommended to maintain regular and soft bowel movements. She is also advised to drink more water and maintain a high-fiber diet. A colonoscopy has been scheduled for 10/30/2024 to further evaluate the condition.       Patient or patient  representative verbalized consent for the use of Ambient Listening during the visit with  Yolie Saleh PA-C for chart documentation. 9/9/2024  11:50 EDT    Yolie Saleh PA-C  Physician Assistant  Colorectal Surgery

## 2024-10-03 ENCOUNTER — TRANSCRIBE ORDERS (OUTPATIENT)
Dept: PET IMAGING | Facility: HOSPITAL | Age: 64
End: 2024-10-03
Payer: MEDICARE

## 2024-10-03 ENCOUNTER — HOSPITAL ENCOUNTER (OUTPATIENT)
Dept: PET IMAGING | Facility: HOSPITAL | Age: 64
Discharge: HOME | End: 2024-10-03
Admitting: INTERNAL MEDICINE
Payer: MEDICARE

## 2024-10-03 DIAGNOSIS — Z78.0 POSTMENOPAUSAL STATUS (AGE-RELATED) (NATURAL): Primary | ICD-10-CM

## 2024-10-03 PROCEDURE — 77080 DXA BONE DENSITY AXIAL: CPT

## 2024-11-01 NOTE — PROGRESS NOTES
Subjective   Patient ID: Leonie Becker is a 64 y.o. female is here today for follow-up.    I have not seen her in about 7 months.  The ulnar problem has been getting better.  In fact she has been playing her guitar and the left distal arm and hand have been doing pretty well.  But about 6 months ago she was working with her hairdresser and was having her hair washed and she began having some neck pain and right deltoid and arm pain.  She just worked with therapy at [facility] pain management and it really has not helped.  She has known osteophytic cervical disc disease at 3 levels from C4-C7.  She has not had an epidural block in the past anytime recently but she has had 1 before and tolerated it.  Will asked Dr. Goodman her pain physician to do that and have her come back.  She has no motor deficits.  If there is no benefit then we will reimage her and consider surgery but hopefully it does not come to that.        History of Present Illness    The following portions of the patient's history were reviewed and updated as appropriate: allergies, current medications, past family history, past medical history, past social history, past surgical history, and problem list.    Review of Systems   Constitutional:  Negative for fever.   Musculoskeletal:  Positive for neck pain and neck stiffness. Negative for gait problem.   Neurological:  Negative for dizziness, weakness, light-headedness, numbness and headaches.   All other systems reviewed and are negative.      Objective   Physical Exam  Constitutional:       General: She is awake.      Appearance: She is well-developed.   HENT:      Head: Normocephalic and atraumatic.   Eyes:      General: Lids are normal.      Extraocular Movements: Extraocular movements intact.      Conjunctiva/sclera: Conjunctivae normal.      Pupils: Pupils are equal, round, and reactive to light.   Neck:      Vascular: No carotid bruit.   Neurological:      Mental Status: She is alert.       Coordination: Coordination is intact.      Deep Tendon Reflexes:      Reflex Scores:       Tricep reflexes are 2+ on the right side and 2+ on the left side.       Bicep reflexes are 2+ on the right side and 2+ on the left side.       Brachioradialis reflexes are 2+ on the right side and 2+ on the left side.       Patellar reflexes are 2+ on the right side and 2+ on the left side.       Achilles reflexes are 2+ on the right side and 2+ on the left side.  Psychiatric:         Speech: Speech normal.       Neurological Exam  Mental Status  Awake and alert. Oriented only to person, place, time and situation. Recent and remote memory are intact. Speech is normal. Language is fluent with no aphasia. Attention and concentration are normal. Fund of knowledge is appropriate for level of education.    Cranial Nerves  CN II: Visual acuity is normal. Visual fields full to confrontation.  CN III, IV, VI: Extraocular movements intact bilaterally. Normal lids and orbits bilaterally. Pupils equal round and reactive to light bilaterally.  CN V: Facial sensation is normal.  CN VII: Full and symmetric facial movement.  CN IX, X: Palate elevates symmetrically. Normal gag reflex.  CN XI: Shoulder shrug strength is normal.  CN XII: Tongue midline without atrophy or fasciculations.    Motor  Normal muscle bulk throughout. Normal muscle tone.                                               Right                     Left  Rhomboids                            5                          5  Infraspinatus                          5                          5  Supraspinatus                       5                          5  Deltoid                                   5                          5   Biceps                                   5                          5  Brachioradialis                      5                          5   Triceps                                  5                          5   Pronator                                5                           5   Supinator                              5                           5   Wrist flexor                            5                          5   Wrist extensor                       5                          5   Finger flexor                          5                          5   Finger extensor                     5                          5   Interossei                              5                          5   Abductor pollicis brevis         5                          5   Flexor pollicis brevis             5                          5   Opponens pollicis                 5                          5  Extensor digitorum               5                          5  Abductor digiti minimi           5                          5   Abdominal                            5                          5  Glutei                                    5                          5  Hip abductor                         5                          5  Hip adductor                         5                          5   Iliopsoas                               5                          5   Quadriceps                           5                          5   Hamstring                             5                          5   Gastrocnemius                     5                           5   Anterior tibialis                      5                          5   Posterior tibialis                    5                          5   Peroneal                               5                          5  Ankle dorsiflexor                   5                          5  Ankle plantar flexor              5                           5  Extensor hallucis longus      5                           5    Sensory  Light touch is normal in upper and lower extremities. Proprioception is normal in upper and lower extremities.     Reflexes                                            Right                      Left  Brachioradialis                     2+                         2+  Biceps                                 2+                         2+  Triceps                                2+                         2+  Finger flex                           2+                         2+  Hamstring                            2+                         2+  Patellar                                2+                         2+  Achilles                                2+                         2+    Coordination    Finger-to-nose, rapid alternating movements and heel-to-shin normal bilaterally without dysmetria.    Gait  Casual gait is normal including stance, stride, and arm swing.Normal toe walking. Normal heel walking. Normal tandem gait.       Assessment & Plan   Independent Review of Radiographic Studies:      Cervical MRI done on 8/10/2022 was reviewed.  There is some multilevel osteophytic cervical disc disease at C4-C5, C5-C6, and C6-C7 with bilateral root entrapment.  Agree with the report.    Medical Decision Making:      We will ask Dr. Goodman her pain physician in [facility] to do a series of cervical epidural blocks and I will see her in 3 months.  If there is no benefit then we will have to reimage her and consider surgical treatment but hopefully we can avoid that.    Diagnoses and all orders for this visit:    1. Cervical disc disorder at C5-C6 level with radiculopathy (Primary)  -     Ambulatory Referral to Pain Management    2. Cervical disc disorder at C4-C5 level with radiculopathy  -     Ambulatory Referral to Pain Management    3. Chronic neck pain  -     Ambulatory Referral to Pain Management    4. Ulnar neuropathy of both upper extremities    5. Carpal tunnel syndrome of right wrist      Return in about 3 months (around 2/4/2025) for Face-to-face.

## 2024-11-04 ENCOUNTER — OFFICE VISIT (OUTPATIENT)
Dept: NEUROSURGERY | Facility: CLINIC | Age: 64
End: 2024-11-04
Payer: MEDICARE

## 2024-11-04 VITALS
WEIGHT: 106 LBS | SYSTOLIC BLOOD PRESSURE: 108 MMHG | RESPIRATION RATE: 20 BRPM | HEIGHT: 63 IN | BODY MASS INDEX: 18.78 KG/M2 | DIASTOLIC BLOOD PRESSURE: 68 MMHG

## 2024-11-04 DIAGNOSIS — M54.2 CHRONIC NECK PAIN: ICD-10-CM

## 2024-11-04 DIAGNOSIS — G56.23 ULNAR NEUROPATHY OF BOTH UPPER EXTREMITIES: ICD-10-CM

## 2024-11-04 DIAGNOSIS — G89.29 CHRONIC NECK PAIN: ICD-10-CM

## 2024-11-04 DIAGNOSIS — M50.122 CERVICAL DISC DISORDER AT C5-C6 LEVEL WITH RADICULOPATHY: Primary | ICD-10-CM

## 2024-11-04 DIAGNOSIS — M50.121 CERVICAL DISC DISORDER AT C4-C5 LEVEL WITH RADICULOPATHY: ICD-10-CM

## 2024-11-04 DIAGNOSIS — G56.01 CARPAL TUNNEL SYNDROME OF RIGHT WRIST: ICD-10-CM

## 2024-11-04 PROCEDURE — 99214 OFFICE O/P EST MOD 30 MIN: CPT | Performed by: NEUROLOGICAL SURGERY

## 2024-11-04 PROCEDURE — 1159F MED LIST DOCD IN RCRD: CPT | Performed by: NEUROLOGICAL SURGERY

## 2024-11-04 PROCEDURE — 1160F RVW MEDS BY RX/DR IN RCRD: CPT | Performed by: NEUROLOGICAL SURGERY

## 2024-11-04 RX ORDER — ERGOCALCIFEROL 1.25 MG/1
1 CAPSULE ORAL WEEKLY
COMMUNITY

## 2024-11-05 ENCOUNTER — TELEPHONE (OUTPATIENT)
Dept: NEUROSURGERY | Facility: CLINIC | Age: 64
End: 2024-11-05
Payer: MEDICARE

## 2024-11-05 RX ORDER — METHYLPREDNISOLONE 4 MG/1
TABLET ORAL
Qty: 21 TABLET | Refills: 0 | Status: SHIPPED | OUTPATIENT
Start: 2024-11-05

## 2024-11-05 NOTE — TELEPHONE ENCOUNTER
Caller: DAYANARA    Relationship: SELF    Best call back number: [phone]    What was the call regarding: PATIENT CALLED STATING  ON 10.31.24 SHE INJURED HER NECK AND EVER SINCE THEN SHE HAS BEEN HAVING SORENESS GOING UP HER NECK AROUND HER EARS AND ONTO THE SIDE OF HER FACE - STATES SHE FORGOT TO TELL DR ZAYAS ABOUT THIS DURING HER APPT ON 11.4.24 - DENIES NUMBNESS AND TINGLING - SAYS IT JUST FEELS VERY TENDER WHEN SHE TOUCHES HER FACE    THANK YOU

## 2024-11-05 NOTE — TELEPHONE ENCOUNTER
Rx Refill Note  Requested Prescriptions     Pending Prescriptions Disp Refills    methylPREDNISolone (MEDROL) 4 MG dose pack 21 tablet 0     Sig: Take as directed on package instructions.      Last office visit with prescribing clinician: 11/4/2024   Last telemedicine visit with prescribing clinician: Visit date not found   Next office visit with prescribing clinician: 2/17/2025                         Would you like a call back once the refill request has been completed: [] Yes [] No    If the office needs to give you a call back, can they leave a voicemail: [] Yes [] No    Luz Mcclure MA  11/05/24, 16:15 EST

## 2024-11-08 ENCOUNTER — TELEPHONE (OUTPATIENT)
Dept: INTERNAL MEDICINE | Facility: CLINIC | Age: 64
End: 2024-11-08
Payer: MEDICARE

## 2024-11-08 NOTE — TELEPHONE ENCOUNTER
Caller: Leonie Becker    Relationship: Self    Best call back number: [phone]     Who are you requesting to speak with (clinical staff, provider,  specific staff member): CLINICAL STAFF     What was the call regarding: SEEN DR. MERCADO AND WAS GIVEN A MEDROL PACK AND WAS CONCERNED ABOUT TAKING IT BECAUSE OF HER OSTEOPOROSIS IS IT SAFE TO TAKE FOR HER PLEASE CALL AND ADVISE

## 2025-02-10 ENCOUNTER — TELEPHONE (OUTPATIENT)
Dept: SURGERY | Facility: CLINIC | Age: 65
End: 2025-02-10
Payer: MEDICARE

## 2025-02-12 NOTE — PROGRESS NOTES
Subjective   Patient ID: Leonie Becker is a 64 y.o. female is here today for follow-up regarding Pain Management     She had an epidural block by Dr. Goodman and was sedated.  She only got a small amount of the steroid but did seem to help a little bit.  She has cervical stenosis at C4-C5, C5-C6 and C6-C7 with some neck pain but mostly right arm pain.  She has no motor deficits.  I would like to avoid surgery if we can.  She wants to try another block.  I am not opposed to that.  She also complains of about a year and a half history of numbness and tingling and a burning sensation in her calves and feet.  She said it came about after walking a lot around the track [place] [email or web address] initially was felt to be shinsplints but it has not gone away.  He was seen by Dr. Day and an MRI of the distal lower extremities was done which was unremarkable.  It sounds to me that were dealing with a neuropathy.  She says that she had an EMG done by a podiatrist which I have not reviewed but I told her I was skeptical of interpretations by nonphysician's in regards to this test so we will send her to Dr. Gan to do a bilateral lower extremity EMG/NCS.  If there is evidence of a neuropathy then she needs to be seen by neurology to sort out the cause of it if it can be determined.  She already has an ointment prescribed by Dr. Day with some gabapentin in at.  I told her to give it a try but she said that oral gabapentin has made her sleepy.  It might not do the same thing if it is in the ointment but we will see.  Will have her come back after the EMG/NCS is done.        History of Present Illness    The following portions of the patient's history were reviewed and updated as appropriate: allergies, current medications, past family history, past medical history, past social history, past surgical history, and problem list.    Review of Systems   Constitutional:  Negative for fever.   Musculoskeletal:  Positive for neck pain. Negative for  neck stiffness.   Neurological:  Negative for weakness and numbness.   All other systems reviewed and are negative.      Objective   Physical Exam  Constitutional:       General: She is awake.      Appearance: She is well-developed.   HENT:      Head: Normocephalic and atraumatic.   Eyes:      General: Lids are normal.      Extraocular Movements: Extraocular movements intact.      Conjunctiva/sclera: Conjunctivae normal.      Pupils: Pupils are equal, round, and reactive to light.   Neck:      Vascular: No carotid bruit.   Neurological:      Mental Status: She is alert.      Coordination: Coordination is intact.      Deep Tendon Reflexes:      Reflex Scores:       Tricep reflexes are 2+ on the right side and 2+ on the left side.       Bicep reflexes are 2+ on the right side and 2+ on the left side.       Brachioradialis reflexes are 2+ on the right side and 2+ on the left side.       Patellar reflexes are 2+ on the right side and 2+ on the left side.       Achilles reflexes are 2+ on the right side and 2+ on the left side.  Psychiatric:         Speech: Speech normal.       Neurological Exam  Mental Status  Awake and alert. Oriented only to person, place, time and situation. Recent and remote memory are intact. Speech is normal. Language is fluent with no aphasia. Attention and concentration are normal. Fund of knowledge is appropriate for level of education.    Cranial Nerves  CN II: Visual acuity is normal. Visual fields full to confrontation.  CN III, IV, VI: Extraocular movements intact bilaterally. Normal lids and orbits bilaterally. Pupils equal round and reactive to light bilaterally.  CN V: Facial sensation is normal.  CN VII: Full and symmetric facial movement.  CN IX, X: Palate elevates symmetrically. Normal gag reflex.  CN XI: Shoulder shrug strength is normal.  CN XII: Tongue midline without atrophy or fasciculations.    Motor  Normal muscle bulk throughout. Normal muscle tone.                                                Right                     Left  Rhomboids                            5                          5  Infraspinatus                          5                          5  Supraspinatus                       5                          5  Deltoid                                   5                          5   Biceps                                   5                          5  Brachioradialis                      5                          5   Triceps                                  5                          5   Pronator                                5                          5   Supinator                              5                           5   Wrist flexor                            5                          5   Wrist extensor                       5                          5   Finger flexor                          5                          5   Finger extensor                     5                          5   Interossei                              5                          5   Abductor pollicis brevis         5                          5   Flexor pollicis brevis             5                          5   Opponens pollicis                 5                          5  Extensor digitorum               5                          5  Abductor digiti minimi           5                          5   Abdominal                            5                          5  Glutei                                    5                          5  Hip abductor                         5                          5  Hip adductor                         5                          5   Iliopsoas                               5                          5   Quadriceps                           5                          5   Hamstring                             5                          5   Gastrocnemius                     5                           5   Anterior tibialis                      5                           5   Posterior tibialis                    5                          5   Peroneal                               5                          5  Ankle dorsiflexor                   5                          5  Ankle plantar flexor              5                           5  Extensor hallucis longus      5                           5    Sensory  Light touch is normal in upper and lower extremities. Proprioception is normal in upper and lower extremities.     Reflexes                                            Right                      Left  Brachioradialis                    2+                         2+  Biceps                                 2+                         2+  Triceps                                2+                         2+  Finger flex                           2+                         2+  Hamstring                            2+                         2+  Patellar                                2+                         2+  Achilles                                2+                         2+    Coordination    Finger-to-nose, rapid alternating movements and heel-to-shin normal bilaterally without dysmetria.    Gait  Casual gait is normal including stance, stride, and arm swing.Normal toe walking. Normal heel walking. Normal tandem gait.       Assessment & Plan   Independent Review of Radiographic Studies:      Cervical MRI done on 8/10/2022 was reviewed.  There is some multilevel osteophytic cervical disc disease at C4-C5, C5-C6, and C6-C7 with bilateral root entrapment.  Agree with the report.     Medical Decision Making:      Asked Dr. Gan to do an EMG/NCS of both lower extremities and have her come back to discuss it.  She will try the compounded ointment with gabapentin in it.  Apparently gabapentin made her too sleepy when taken orally.  We might consider Lyrica as an alternative if the ointment does not help.  Will have her come back after the study is done.  In the meantime  she will continue working with Dr. Goodman in her neck.    Diagnoses and all orders for this visit:    1. Peripheral polyneuropathy (Primary)  -     EMG & Nerve Conduction Test; Future    2. Chronic neck pain    3. Cervical disc disorder at C5-C6 level with radiculopathy    4. Cervical disc disorder at C4-C5 level with radiculopathy    5. Hereditary and idiopathic peripheral neuropathy    6. Unspecified mononeuropathy of bilateral lower limbs  -     EMG & Nerve Conduction Test; Future      Return in about 2 months (around 4/17/2025) for after EMG/NCS, face to face.

## 2025-02-17 ENCOUNTER — OFFICE VISIT (OUTPATIENT)
Dept: NEUROSURGERY | Facility: CLINIC | Age: 65
End: 2025-02-17
Payer: MEDICARE

## 2025-02-17 VITALS
RESPIRATION RATE: 20 BRPM | BODY MASS INDEX: 18.78 KG/M2 | WEIGHT: 106 LBS | HEIGHT: 63 IN | DIASTOLIC BLOOD PRESSURE: 64 MMHG | SYSTOLIC BLOOD PRESSURE: 110 MMHG

## 2025-02-17 DIAGNOSIS — M54.2 CHRONIC NECK PAIN: ICD-10-CM

## 2025-02-17 DIAGNOSIS — M50.122 CERVICAL DISC DISORDER AT C5-C6 LEVEL WITH RADICULOPATHY: ICD-10-CM

## 2025-02-17 DIAGNOSIS — M50.121 CERVICAL DISC DISORDER AT C4-C5 LEVEL WITH RADICULOPATHY: ICD-10-CM

## 2025-02-17 DIAGNOSIS — G89.29 CHRONIC NECK PAIN: ICD-10-CM

## 2025-02-17 DIAGNOSIS — G60.9 HEREDITARY AND IDIOPATHIC PERIPHERAL NEUROPATHY: ICD-10-CM

## 2025-02-17 DIAGNOSIS — G57.93 UNSPECIFIED MONONEUROPATHY OF BILATERAL LOWER LIMBS: ICD-10-CM

## 2025-02-17 DIAGNOSIS — G62.9 PERIPHERAL POLYNEUROPATHY: Primary | ICD-10-CM

## 2025-02-17 PROCEDURE — 1159F MED LIST DOCD IN RCRD: CPT | Performed by: NEUROLOGICAL SURGERY

## 2025-02-17 PROCEDURE — 99214 OFFICE O/P EST MOD 30 MIN: CPT | Performed by: NEUROLOGICAL SURGERY

## 2025-02-17 PROCEDURE — 1160F RVW MEDS BY RX/DR IN RCRD: CPT | Performed by: NEUROLOGICAL SURGERY

## 2025-05-02 ENCOUNTER — TELEPHONE (OUTPATIENT)
Dept: INTERNAL MEDICINE | Facility: CLINIC | Age: 65
End: 2025-05-02
Payer: MEDICARE

## 2025-05-02 NOTE — TELEPHONE ENCOUNTER
Caller: Leonie Becker    Relationship: Self    Best call back number:[phone]     What was the call regarding: PATIENT HAS A COLONOSCOPY ON 5/7 AND HAS LABS WITH THE OFFICE ON 5/9. SHE IS NOT SURE IF THE PREP FOR THE COLONOSCOPY COULD EFFECT HER LABS. SHE IS CONCERNED SHE MAY BE DEHYDRATED. SHE WOULD LIKE TO GET A CLINICAL OPINION ON IF SHE NEEDS TO RESCHEDULE

## 2025-05-07 ENCOUNTER — ANESTHESIA EVENT (OUTPATIENT)
Dept: GASTROENTEROLOGY | Facility: HOSPITAL | Age: 65
End: 2025-05-07
Payer: MEDICARE

## 2025-05-07 ENCOUNTER — HOSPITAL ENCOUNTER (OUTPATIENT)
Facility: HOSPITAL | Age: 65
Setting detail: HOSPITAL OUTPATIENT SURGERY
Discharge: HOME | End: 2025-05-07
Attending: COLON & RECTAL SURGERY | Admitting: COLON & RECTAL SURGERY
Payer: MEDICARE

## 2025-05-07 ENCOUNTER — ANESTHESIA (OUTPATIENT)
Dept: GASTROENTEROLOGY | Facility: HOSPITAL | Age: 65
End: 2025-05-07
Payer: MEDICARE

## 2025-05-07 VITALS
RESPIRATION RATE: 16 BRPM | DIASTOLIC BLOOD PRESSURE: 52 MMHG | BODY MASS INDEX: 18.82 KG/M2 | OXYGEN SATURATION: 99 % | HEIGHT: 63 IN | HEART RATE: 65 BPM | SYSTOLIC BLOOD PRESSURE: 102 MMHG | WEIGHT: 106.2 LBS

## 2025-05-07 PROCEDURE — 25010000002 LIDOCAINE 2% SOLUTION: Performed by: NURSE ANESTHETIST, CERTIFIED REGISTERED

## 2025-05-07 PROCEDURE — 25010000002 PROPOFOL 10 MG/ML EMULSION: Performed by: NURSE ANESTHETIST, CERTIFIED REGISTERED

## 2025-05-07 PROCEDURE — 25810000003 LACTATED RINGERS PER 1000 ML: Performed by: COLON & RECTAL SURGERY

## 2025-05-07 PROCEDURE — G0105 COLORECTAL SCRN; HI RISK IND: HCPCS | Performed by: COLON & RECTAL SURGERY

## 2025-05-07 RX ORDER — SODIUM CHLORIDE, SODIUM LACTATE, POTASSIUM CHLORIDE, CALCIUM CHLORIDE 600; 310; 30; 20 MG/100ML; MG/100ML; MG/100ML; MG/100ML
30 INJECTION, SOLUTION INTRAVENOUS CONTINUOUS
Status: DISCONTINUED | OUTPATIENT
Start: 2025-05-07 | End: 2025-05-07 | Stop reason: HOSPADM

## 2025-05-07 RX ORDER — OMEGA-3S/DHA/EPA/FISH OIL/D3 300MG-1000
400 CAPSULE ORAL DAILY
COMMUNITY

## 2025-05-07 RX ORDER — LIDOCAINE HYDROCHLORIDE 20 MG/ML
INJECTION, SOLUTION INFILTRATION; PERINEURAL AS NEEDED
Status: DISCONTINUED | OUTPATIENT
Start: 2025-05-07 | End: 2025-05-07 | Stop reason: SURG

## 2025-05-07 RX ORDER — PROPOFOL 10 MG/ML
VIAL (ML) INTRAVENOUS AS NEEDED
Status: DISCONTINUED | OUTPATIENT
Start: 2025-05-07 | End: 2025-05-07 | Stop reason: SURG

## 2025-05-07 RX ADMIN — PROPOFOL 50 MG: 10 INJECTION, EMULSION INTRAVENOUS at 11:34

## 2025-05-07 RX ADMIN — PROPOFOL 140 MCG/KG/MIN: 10 INJECTION, EMULSION INTRAVENOUS at 11:35

## 2025-05-07 RX ADMIN — LIDOCAINE HYDROCHLORIDE 40 MG: 20 INJECTION, SOLUTION INFILTRATION; PERINEURAL at 11:34

## 2025-05-07 RX ADMIN — SODIUM CHLORIDE, POTASSIUM CHLORIDE, SODIUM LACTATE AND CALCIUM CHLORIDE 30 ML/HR: 600; 310; 30; 20 INJECTION, SOLUTION INTRAVENOUS at 11:03

## 2025-05-07 NOTE — DISCHARGE INSTRUCTIONS
For the next 24 hours patient needs to be with a responsible adult.    For 24 hours DO NOT drive, operate machinery, appliances, drink alcohol, make important decisions or sign legal documents.    Start with a light or bland diet if you are feeling sick to your stomach otherwise advance to regular diet as tolerated.    Follow recommendations on procedure report if provided by your doctor.    Call Dr Clemente for problems [phone]    Problems may include but not limited to: large amounts of bleeding, trouble breathing, repeated vomiting, severe unrelieved pain, fever or chills.

## 2025-05-07 NOTE — ANESTHESIA POSTPROCEDURE EVALUATION
Patient: Leonie Becker    Procedure Summary       Date: 05/07/25 Room / Location: [facility] ENDOSCOPY 6 / [facility] ENDOSCOPY    Anesthesia Start: 1129 Anesthesia Stop: 1146    Procedure: COLONOSCOPY TO CECUM Diagnosis:       Screening for colon cancer      (Screening for colon cancer [Z12.11])    Surgeons: Shiela Clemente MD Provider: Casey Nash MD    Anesthesia Type: MAC ASA Status: 2            Anesthesia Type: MAC    Vitals  Vitals Value Taken Time   /52 05/07/25 12:10   Temp     Pulse 65 05/07/25 12:12   Resp 16 05/07/25 12:10   SpO2 99 % 05/07/25 12:12   Vitals shown include unfiled device data.        Post Anesthesia Care and Evaluation    Patient location during evaluation: PACU  Patient participation: complete - patient participated  Level of consciousness: awake and alert  Pain management: adequate    Airway patency: patent  Anesthetic complications: No anesthetic complications    Cardiovascular status: acceptable  Respiratory status: acceptable  Hydration status: acceptable    Comments: --------------------            05/07/25               1210     --------------------   BP:       102/52     Pulse:      65       Resp:       16       SpO2:      99%      --------------------

## 2025-05-07 NOTE — H&P
Leonie Becker is a 64 y.o. female  who is referred by Shiela Clemente MD for a colonoscopy. She   has an indications: screening for colon cancer.     She denies any change in bowel function, melena, or hematochezia.    Past Medical History:   Diagnosis Date    Abnormal blood smear     Abnormal liver function test     Allergic Seasonal    Arm pain     Arthritis     Back pain     Blood tests for routine general physical examination     Cataract Slow growing    Facial basal cell cancer     H/O bladder infections     Hand pain     cubital tunnel syndrome    History of medical problems 1994, 2015    Chronic Refractory Interstitial Cystitis (1994), Tinnitus and Hyperacusis (2015)    Hyperlipidemia     Limb pain     Neck pain     Neuromuscular disorder 12/2019    Cubital Tunnel Syndrome both arms    Osteopenia Osteopenia    Osteoporosis     Pelvic pain     Rectal bleeding 11-    Hemorrhoid burst    Rosacea     Tinnitus        Past Surgical History:   Procedure Laterality Date    COLONOSCOPY  2014    CYSTOSCOPY      Diagnostic    KNEE SURGERY      ULNAR TUNNEL RELEASE         Medications Prior to Admission   Medication Sig Dispense Refill Last Dose/Taking    baclofen (LIORESAL) 10 MG tablet Take by mouth 3 (three) times a day as needed. 1 to 2 tablets   Past Week    Cholecalciferol 10 MCG (400 UNIT) tablet Take 1 tablet by mouth Daily.   Past Week    clobetasol (TEMOVATE) 0.05 % ointment clobetasol 0.05 % topical ointment   APPLY THIN LAYER THREE TIMES DAILY FOR 3 WEEKS THEN ONCE DAILY UNTIL SKIN LOOKS AND FEELS NORMAL THEN USE TWICE WEEKLY   Past Week    diazepam (VALIUM) 5 MG tablet Take 1 tablet by mouth At Night As Needed.   Past Week    Estriol powder    Past Week    Hormone Cream Base cream    Taking    Ivermectin powder    Past Week    metroNIDAZOLE (METROCREAM) 0.75 % cream Apply 1 Application topically to the appropriate area as directed 2 (Two) Times a Day.   Past Week    nortriptyline (PAMELOR) 10 MG  capsule Take 1 capsule by mouth Every Night.   Past Month    oxyCODONE (ROXICODONE) 5 MG immediate release tablet Take 1 to 2 tablets every 4 to 6 hours as needed for pain   Past Month    PREBIOTIC PRODUCT PO Take  by mouth.   Past Week    Probiotic Product (PROBIOTIC ADVANCED PO) Take  by mouth.   Past Week    Progesterone Micronized (PROGESTERONE, BULK,) powder Place on the skin.     Past Week    traMADol (ULTRAM) 50 MG tablet Take 1 tablet by mouth 2 (Two) Times a Day As Needed.   Past Week    ergocalciferol (ERGOCALCIFEROL) 1.25 MG (63450 UT) capsule Take 1 capsule by mouth 1 (One) Time Per Week.       Hydrocortisone, Perianal, (Anusol-HC) 2.5 % rectal cream Apply to hemorrhoids 3 times daily for 7 days during hemorrhoid flare. Include applicator. Use for 7 days, then take a break for 7 days before resuming this pattern. 30 g 1 More than a month    lidocaine (LIDODERM) 5 % Place 1 patch on the skin as directed by provider Daily. Remove & Discard patch within 12 hours or as directed by MD   More than a month    lidocaine (XYLOCAINE) 5 % ointment Apply 1 application  topically to the appropriate area as directed Every 2 (Two) Hours As Needed for Moderate Pain. 30 g 0     methylPREDNISolone (MEDROL) 4 MG dose pack Take as directed on package instructions. 21 tablet 0 More than a month    promethazine (PHENERGAN) 12.5 MG tablet Take 1 tablet by mouth Every 6 (Six) Hours As Needed.   More than a month       Allergies   Allergen Reactions    Crestor [Rosuvastatin Calcium] Myalgia    Zoster Vac Recomb Adjuvanted Other (See Comments)     Caused ringing in ears       Family History   Problem Relation Age of Onset    Hypertension Mother         benign essential    Diabetes Mother         Type II    Heart disease Mother          of heart attack    Thyroid disease Mother     Asthma Mother     Depression Mother     Aortic aneurysm Father         Thoracic    Glaucoma Father     Vision loss Father         Glaucoma    Heart  disease Father         Aortic Aneurysm; dissected Aorta    Depression Sister     Thyroid disease Sister     Diabetes Sister         Type II    Vision loss Sister         Glaucoma    Malig Hyperthermia Neg Hx        Social History     Socioeconomic History    Marital status: Single   Tobacco Use    Smoking status: Former     Current packs/day: 0.00     Average packs/day: 0.3 packs/day for 2.0 years (0.5 ttl pk-yrs)     Types: Cigarettes     Start date: 1991     Quit date: 1993     Years since quittin.3     Passive exposure: Past    Smokeless tobacco: Never   Vaping Use    Vaping status: Never Used   Substance and Sexual Activity    Alcohol use: No    Drug use: No    Sexual activity: Not Currently       ROS    Vitals:    25 1052   BP: 116/59   Pulse: 66   Resp: 18   SpO2: 99%         Physical Exam  Constitutional:       Appearance: She is well-developed.   HENT:      Head: Normocephalic and atraumatic.   Eyes:      Pupils: Pupils are equal, round, and reactive to light.   Cardiovascular:      Rate and Rhythm: Regular rhythm.   Pulmonary:      Effort: Pulmonary effort is normal.   Abdominal:      General: There is no distension.      Palpations: Abdomen is soft.   Musculoskeletal:         General: Normal range of motion.   Skin:     General: Skin is warm and dry.   Neurological:      Mental Status: She is alert and oriented to person, place, and time.   Psychiatric:         Thought Content: Thought content normal.         Judgment: Judgment normal.           Assessment & Plan      indications: screening for colon cancer         I recommend colonoscopy.  I described risks, benefits of the procedure with the patient including but not limited to bleeding, infection, possibility of perforation and possible polypectomy. All of the patient's questions were answered and they would like to proceed with the above recommendations.

## 2025-05-07 NOTE — ANESTHESIA PREPROCEDURE EVALUATION
Anesthesia Evaluation     Patient summary reviewed and Nursing notes reviewed                Airway   Mallampati: II  Small opening  Dental      Pulmonary - negative pulmonary ROS   Cardiovascular     ECG reviewed  Rhythm: regular  Rate: normal    (+) hyperlipidemia      Neuro/Psych  (+) neuromuscular disease (inherited peripheral neuropathy), numbness  GI/Hepatic/Renal/Endo    (+) GI bleeding , thyroid problem     Musculoskeletal     (+) back pain, neck pain  Abdominal    Substance History - negative use     OB/GYN negative ob/gyn ROS         Other   arthritis,   history of cancer (skin)                Anesthesia Plan    ASA 2     MAC     intravenous induction     Anesthetic plan, risks, benefits, and alternatives have been provided, discussed and informed consent has been obtained with: patient.    CODE STATUS:

## 2025-05-09 DIAGNOSIS — E55.9 VITAMIN D DEFICIENCY: ICD-10-CM

## 2025-05-09 DIAGNOSIS — E03.8 OTHER SPECIFIED HYPOTHYROIDISM: ICD-10-CM

## 2025-05-09 DIAGNOSIS — E78.5 HYPERLIPIDEMIA, UNSPECIFIED HYPERLIPIDEMIA TYPE: Primary | ICD-10-CM

## 2025-05-09 LAB
25(OH)D3+25(OH)D2 SERPL-MCNC: 39.5 NG/ML (ref 30–100)
ALBUMIN SERPL-MCNC: 4.1 G/DL (ref 3.5–5.2)
ALBUMIN/GLOB SERPL: 1.8 G/DL
ALP SERPL-CCNC: 79 U/L (ref 39–117)
ALT SERPL-CCNC: 20 U/L (ref 1–33)
APPEARANCE UR: CLEAR
AST SERPL-CCNC: 26 U/L (ref 1–32)
BACTERIA #/AREA URNS HPF: NORMAL /HPF
BASOPHILS # BLD AUTO: 0.07 10*3/MM3 (ref 0–0.2)
BASOPHILS NFR BLD AUTO: 1.7 % (ref 0–1.5)
BILIRUB SERPL-MCNC: 0.2 MG/DL (ref 0–1.2)
BILIRUB UR QL STRIP: NEGATIVE
BUN SERPL-MCNC: 16 MG/DL (ref 8–23)
BUN/CREAT SERPL: 17.8 (ref 7–25)
CALCIUM SERPL-MCNC: 9 MG/DL (ref 8.6–10.5)
CASTS URNS MICRO: NORMAL
CHLORIDE SERPL-SCNC: 105 MMOL/L (ref 98–107)
CHOLEST SERPL-MCNC: 240 MG/DL (ref 0–200)
CO2 SERPL-SCNC: 25.6 MMOL/L (ref 22–29)
COLOR UR: YELLOW
CREAT SERPL-MCNC: 0.9 MG/DL (ref 0.57–1)
EGFRCR SERPLBLD CKD-EPI 2021: 70.7 ML/MIN/1.73
EOSINOPHIL # BLD AUTO: 0.17 10*3/MM3 (ref 0–0.4)
EOSINOPHIL NFR BLD AUTO: 4 % (ref 0.3–6.2)
EPI CELLS #/AREA URNS HPF: NORMAL /HPF
ERYTHROCYTE [DISTWIDTH] IN BLOOD BY AUTOMATED COUNT: 11.8 % (ref 12.3–15.4)
GLOBULIN SER CALC-MCNC: 2.3 GM/DL
GLUCOSE SERPL-MCNC: 84 MG/DL (ref 65–99)
GLUCOSE UR QL STRIP: NEGATIVE
HCT VFR BLD AUTO: 40.3 % (ref 34–46.6)
HDLC SERPL-MCNC: 88 MG/DL (ref 40–60)
HGB BLD-MCNC: 12.9 G/DL (ref 12–15.9)
HGB UR QL STRIP: NEGATIVE
IMM GRANULOCYTES # BLD AUTO: 0.01 10*3/MM3 (ref 0–0.05)
IMM GRANULOCYTES NFR BLD AUTO: 0.2 % (ref 0–0.5)
KETONES UR QL STRIP: NEGATIVE
LDLC SERPL CALC-MCNC: 143 MG/DL (ref 0–100)
LEUKOCYTE ESTERASE UR QL STRIP: NEGATIVE
LYMPHOCYTES # BLD AUTO: 1.31 10*3/MM3 (ref 0.7–3.1)
LYMPHOCYTES NFR BLD AUTO: 31.2 % (ref 19.6–45.3)
MCH RBC QN AUTO: 29 PG (ref 26.6–33)
MCHC RBC AUTO-ENTMCNC: 32 G/DL (ref 31.5–35.7)
MCV RBC AUTO: 90.6 FL (ref 79–97)
MONOCYTES # BLD AUTO: 0.51 10*3/MM3 (ref 0.1–0.9)
MONOCYTES NFR BLD AUTO: 12.1 % (ref 5–12)
NEUTROPHILS # BLD AUTO: 2.13 10*3/MM3 (ref 1.7–7)
NEUTROPHILS NFR BLD AUTO: 50.8 % (ref 42.7–76)
NITRITE UR QL STRIP: NEGATIVE
NRBC BLD AUTO-RTO: 0 /100 WBC (ref 0–0.2)
PH UR STRIP: 6 [PH] (ref 5–8)
PLATELET # BLD AUTO: 245 10*3/MM3 (ref 140–450)
POTASSIUM SERPL-SCNC: 4.2 MMOL/L (ref 3.5–5.2)
PROT SERPL-MCNC: 6.4 G/DL (ref 6–8.5)
PROT UR QL STRIP: NEGATIVE
RBC # BLD AUTO: 4.45 10*6/MM3 (ref 3.77–5.28)
RBC #/AREA URNS HPF: NORMAL /HPF
SODIUM SERPL-SCNC: 142 MMOL/L (ref 136–145)
SP GR UR STRIP: 1.01 (ref 1–1.03)
T4 FREE SERPL-MCNC: 1.08 NG/DL (ref 0.93–1.7)
TRIGL SERPL-MCNC: 55 MG/DL (ref 0–150)
TSH SERPL DL<=0.005 MIU/L-ACNC: 5.54 UIU/ML (ref 0.27–4.2)
UROBILINOGEN UR STRIP-MCNC: NORMAL MG/DL
VLDLC SERPL CALC-MCNC: 9 MG/DL (ref 5–40)
WBC # BLD AUTO: 4.2 10*3/MM3 (ref 3.4–10.8)
WBC #/AREA URNS HPF: NORMAL /HPF

## 2025-05-14 ENCOUNTER — OFFICE VISIT (OUTPATIENT)
Dept: INTERNAL MEDICINE | Facility: CLINIC | Age: 65
End: 2025-05-14
Payer: MEDICARE

## 2025-05-14 ENCOUNTER — TELEPHONE (OUTPATIENT)
Dept: INTERNAL MEDICINE | Facility: CLINIC | Age: 65
End: 2025-05-14

## 2025-05-14 ENCOUNTER — TRANSCRIBE ORDERS (OUTPATIENT)
Dept: ADMINISTRATIVE | Facility: HOSPITAL | Age: 65
End: 2025-05-14
Payer: MEDICARE

## 2025-05-14 VITALS
HEART RATE: 62 BPM | WEIGHT: 105 LBS | OXYGEN SATURATION: 98 % | DIASTOLIC BLOOD PRESSURE: 70 MMHG | SYSTOLIC BLOOD PRESSURE: 130 MMHG | BODY MASS INDEX: 18.61 KG/M2 | HEIGHT: 63 IN

## 2025-05-14 DIAGNOSIS — Z13.6 ENCOUNTER FOR SCREENING FOR VASCULAR DISEASE: Primary | ICD-10-CM

## 2025-05-14 DIAGNOSIS — E03.8 SUBCLINICAL HYPOTHYROIDISM: ICD-10-CM

## 2025-05-14 DIAGNOSIS — M81.0 OSTEOPOROSIS, UNSPECIFIED OSTEOPOROSIS TYPE, UNSPECIFIED PATHOLOGICAL FRACTURE PRESENCE: ICD-10-CM

## 2025-05-14 DIAGNOSIS — Z00.00 MEDICARE ANNUAL WELLNESS VISIT, SUBSEQUENT: Primary | ICD-10-CM

## 2025-05-14 DIAGNOSIS — Z13.6 ENCOUNTER FOR SCREENING FOR CORONARY ARTERY DISEASE: Primary | ICD-10-CM

## 2025-05-14 DIAGNOSIS — E78.5 HYPERLIPIDEMIA, UNSPECIFIED HYPERLIPIDEMIA TYPE: ICD-10-CM

## 2025-05-14 PROBLEM — Z12.11 SCREENING FOR COLON CANCER: Status: RESOLVED | Noted: 2024-06-25 | Resolved: 2025-05-14

## 2025-05-14 NOTE — PROGRESS NOTES
Subjective   The ABCs of the Annual Wellness Visit  Medicare Wellness Visit      Leonie Becker is a 64 y.o. patient who presents for a Medicare Wellness Visit.    The following portions of the patient's history were reviewed and   updated as appropriate: allergies, current medications, past family history, past medical history, past social history, past surgical history, and problem list.    Compared to one year ago, the patient's physical   health is the same.  Compared to one year ago, the patient's mental   health is the same.    Recent Hospitalizations:  She was not admitted to the hospital during the last year.     Current Medical Providers:  Patient Care Team:  Rachael Oconnell MD as PCP - General (Internal Medicine)  Maci Jennings MD as Consulting Physician (Obstetrics and Gynecology)  Yolie Saleh PA-C as Physician Assistant (Physician Assistant)    Outpatient Medications Prior to Visit   Medication Sig Dispense Refill    baclofen (LIORESAL) 10 MG tablet Take by mouth 3 (three) times a day as needed. 1 to 2 tablets      Cholecalciferol 10 MCG (400 UNIT) tablet Take 1 tablet by mouth Daily.      clobetasol (TEMOVATE) 0.05 % ointment clobetasol 0.05 % topical ointment   APPLY THIN LAYER THREE TIMES DAILY FOR 3 WEEKS THEN ONCE DAILY UNTIL SKIN LOOKS AND FEELS NORMAL THEN USE TWICE WEEKLY      diazepam (VALIUM) 5 MG tablet Take 1 tablet by mouth At Night As Needed.      Estriol powder       Hormone Cream Base cream       Hydrocortisone, Perianal, (Anusol-HC) 2.5 % rectal cream Apply to hemorrhoids 3 times daily for 7 days during hemorrhoid flare. Include applicator. Use for 7 days, then take a break for 7 days before resuming this pattern. 30 g 1    Ivermectin powder       metroNIDAZOLE (METROCREAM) 0.75 % cream Apply 1 Application topically to the appropriate area as directed 2 (Two) Times a Day.      nortriptyline (PAMELOR) 10 MG capsule Take 1 capsule by mouth Every Night.      oxyCODONE  (ROXICODONE) 5 MG immediate release tablet Take 1 to 2 tablets every 4 to 6 hours as needed for pain      PREBIOTIC PRODUCT PO Take  by mouth.      Probiotic Product (PROBIOTIC ADVANCED PO) Take  by mouth.      Progesterone Micronized (PROGESTERONE, BULK,) powder Place on the skin.        promethazine (PHENERGAN) 12.5 MG tablet Take 1 tablet by mouth Every 6 (Six) Hours As Needed.      traMADol (ULTRAM) 50 MG tablet Take 1 tablet by mouth 2 (Two) Times a Day As Needed.       No facility-administered medications prior to visit.     Opioid medication/s are on active medication list.  and I have evaluated her active treatment plan and pain score trends (see table).  Vitals:    05/14/25 1110   PainSc: 0-No pain     I have reviewed the chart for potential of high risk medication and harmful drug interactions in the elderly.        Aspirin is not on active medication list.  Aspirin use is not indicated based on review of current medical condition/s. Risk of harm outweighs potential benefits.  .    Patient Active Problem List   Diagnosis    Tinnitus    Atopic rhinitis    Chronic neck pain    Chronic interstitial cystitis    Degeneration of cervical intervertebral disc    Hyperlipidemia    Osteoporosis    Pelvic floor dysfunction    Subclinical hypothyroidism    Ulnar neuropathy of both upper extremities    Carpal tunnel syndrome of right wrist    Cervical spondylosis    Cubital tunnel syndrome    Hyperacusis, bilateral    Osteoarthritis (arthritis due to wear and tear of joints)    DDD (degenerative disc disease), lumbar    Displacement of cervical intervertebral disc without myelopathy    Cervical disc disorder at C5-C6 level with radiculopathy    Vitamin D deficiency    Chronic midline low back pain with bilateral sciatica    Cervical disc disorder at C4-C5 level with radiculopathy    Peripheral polyneuropathy    Hereditary and idiopathic peripheral neuropathy    Unspecified mononeuropathy of bilateral lower limbs       "          Objective   Vitals:    25 1110   BP: 130/70   Pulse: 62   SpO2: 98%   Weight: 47.6 kg (105 lb)   Height: 160 cm (62.99\")   PainSc: 0-No pain       Estimated body mass index is 18.6 kg/m² as calculated from the following:    Height as of this encounter: 160 cm (62.99\").    Weight as of this encounter: 47.6 kg (105 lb).    BMI is within normal parameters. No other follow-up for BMI required.        Does the patient have evidence of cognitive impairment? No  Lab Results   Component Value Date    CHLPL 240 (H) 2025    TRIG 55 2025    HDL 88 (H) 2025     (H) 2025    VLDL 9 2025                                                                                                Health  Risk Assessment    Smoking Status:  Social History     Tobacco Use   Smoking Status Former    Current packs/day: 0.00    Average packs/day: 0.3 packs/day for 2.0 years (0.5 ttl pk-yrs)    Types: Cigarettes    Start date: 1991    Quit date: 1993    Years since quittin.3    Passive exposure: Past   Smokeless Tobacco Never     Alcohol Consumption:  Social History     Substance and Sexual Activity   Alcohol Use No       Fall Risk Screen  STEADI Fall Risk Assessment was completed, and patient is at LOW risk for falls.Assessment completed on:2025    Depression Screening   Little interest or pleasure in doing things? Not at all   Feeling down, depressed, or hopeless? Not at all   PHQ-2 Total Score 0      Health Habits and Functional and Cognitive Screenin/12/2025    10:28 AM   Functional & Cognitive Status   Do you have difficulty preparing food and eating? No   Do you have difficulty bathing yourself, getting dressed or grooming yourself? No   Do you have difficulty using the toilet? No   Do you have difficulty moving around from place to place? No   Do you have trouble with steps or getting out of a bed or a chair? No   Current Diet Well Balanced Diet   Dental Exam Up to " date   Eye Exam Up to date   Exercise (times per week) 5 times per week   Current Exercises Include Home Exercise Program (TV, Computer, Etc.);House Cleaning;Light Weights;Walking   Do you need help using the phone?  No   Are you deaf or do you have serious difficulty hearing?  No   Do you need help to go to places out of walking distance? No   Do you need help shopping? No   Do you need help preparing meals?  No   Do you need help with housework?  No   Do you need help with laundry? No   Do you need help taking your medications? No   Do you need help managing money? No   Do you ever drive or ride in a car without wearing a seat belt? No   Have you felt unusual stress, anger or loneliness in the last month? No   Who do you live with? Sibling   If you need help, do you have trouble finding someone available to you? No   Have you been bothered in the last four weeks by sexual problems? No   Do you have difficulty concentrating, remembering or making decisions? No           Age-appropriate Screening Schedule:  Refer to the list below for future screening recommendations based on patient's age, sex and/or medical conditions. Orders for these recommended tests are listed in the plan section. The patient has been provided with a written plan.    Health Maintenance List  Health Maintenance   Topic Date Due    ANNUAL WELLNESS VISIT  03/25/2025    Pneumococcal Vaccine 50+ (1 of 1 - PCV) 11/10/2025 (Originally 7/10/2008)    LIPID PANEL  05/09/2026    DXA SCAN  10/03/2026    MAMMOGRAM  03/05/2027    TDAP/TD VACCINES (4 - Td or Tdap) 04/24/2027    COLORECTAL CANCER SCREENING  05/07/2035    HEPATITIS C SCREENING  Completed    COVID-19 Vaccine  Discontinued    INFLUENZA VACCINE  Discontinued                                                                                                                                                CMS Preventative Services Quick Reference  Risk Factors Identified During Encounter  Immunizations  "Discussed/Encouraged: Prevnar 20 (Pneumococcal 20-valent conjugate)    The above risks/problems have been discussed with the patient.  Pertinent information has been shared with the patient in the After Visit Summary.  An After Visit Summary and PPPS were made available to the patient.    Follow Up:   Next Medicare Wellness visit to be scheduled in 1 year.         Additional E&M Note during same encounter follows:  Patient has additional, significant, and separately identifiable condition(s)/problem(s) that require work above and beyond the Medicare Wellness Visit     Chief Complaint  Medicare Wellness-subsequent    Subjective   HPI  Leonie is also being seen today for additional medical problem/s.    HLD.  Fair control.  Subclinical hypothyroidism.  Stable.    OP.  She is maintained on exercise and dietary calcium.     Review of Systems   HENT:  Positive for tinnitus.    Respiratory: Negative.     Cardiovascular: Negative.    Genitourinary:  Positive for dysuria.   Musculoskeletal:  Positive for arthralgias, back pain and neck pain.              Objective   Vital Signs:  /70   Pulse 62   Ht 160 cm (62.99\")   Wt 47.6 kg (105 lb)   SpO2 98%   BMI 18.60 kg/m²   Physical Exam  Constitutional:       Appearance: She is well-developed.   HENT:      Head: Normocephalic and atraumatic.      Right Ear: Hearing, tympanic membrane and external ear normal.      Left Ear: Hearing, tympanic membrane and external ear normal.      Nose: Nose normal.   Neck:      Thyroid: No thyromegaly.   Cardiovascular:      Rate and Rhythm: Normal rate and regular rhythm.      Heart sounds: Normal heart sounds. No murmur heard.  Pulmonary:      Effort: Pulmonary effort is normal.      Breath sounds: Normal breath sounds.   Abdominal:      General: There is no distension.      Palpations: Abdomen is soft.      Tenderness: There is no abdominal tenderness.   Musculoskeletal:      Cervical back: Neck supple.   Lymphadenopathy:      " Cervical: No cervical adenopathy.   Skin:     General: Skin is warm and dry.   Neurological:      Mental Status: She is alert and oriented to person, place, and time.   Psychiatric:         Speech: Speech normal.         Behavior: Behavior normal.         Thought Content: Thought content normal.         Judgment: Judgment normal.         The following data was reviewed by: Rachael Oconnell MD on 05/14/2025:    Common labs          2/6/2025    11:11 5/9/2025    09:25   Common Labs   Glucose  84    BUN  16    Creatinine  0.90    Sodium  142    Potassium  4.2    Chloride  105    Calcium  9.0    Albumin  4.1    Total Bilirubin  0.2    Alkaline Phosphatase  79    AST (SGOT)  26    ALT (SGPT)  20    WBC  4.20    Hemoglobin  12.9    Hematocrit  40.3    Platelets  245    Total Cholesterol  240    Triglycerides  55    HDL Cholesterol  88    LDL Cholesterol   143    Uric Acid 3.4           Details          This result is from an external source.                  Assessment and Plan Additional age appropriate preventative wellness advice topics were discussed during today's preventative wellness exam(some topics already addressed during AWV portion of the note above):   Nutrition: Discussed nutrition plan with patient. Information shared in after visit summary. Goal is for a well balanced diet to enhance overall health.     Medicare annual wellness visit, subsequent         Hyperlipidemia, unspecified hyperlipidemia type            Subclinical hypothyroidism         Osteoporosis, unspecified osteoporosis type, unspecified pathological fracture presence          HLD.  Stable.  We discussed diet.       Subclinical hypothyroidism.  She is maintaining in subclinical range.  Continue to monitor.    OP.  I recommend to get 1200 mg of calcium and 1000 IUs of vitamin D through diet and supplements and to participate in a weight based exercise to prevent loss of bone mineral density. Bone mineral will be monitored every two years.   She is s/p  five years of Fosamax.      Follow Up   No follow-ups on file.  Patient was given instructions and counseling regarding her condition or for health maintenance advice. Please see specific information pulled into the AVS if appropriate.

## 2025-05-14 NOTE — TELEPHONE ENCOUNTER
Caller: Leonie Becker     Relationship: SELF    Best call back number:     [phone]       What is your medical concern?     PATIENT FORGOT TO ASK TO ABOUT A SCREENING OF THE CARDIAC/ARTERIES.  SHE KNOWS THAT SHE HAS TO PAY OUT OF POCKET FOR THIS SCREENING    PLEASE ADVISE

## 2025-05-15 ENCOUNTER — TELEPHONE (OUTPATIENT)
Dept: INTERNAL MEDICINE | Facility: CLINIC | Age: 65
End: 2025-05-15
Payer: MEDICARE

## 2025-05-15 NOTE — TELEPHONE ENCOUNTER
[facility] facility called wanting an order. Patient dentist put in PT order for diagnoses TMJ. However facility can not accept PT order from dentist for insurance reason, needs PT order from pcp.    Can you please put in physical therapy order for diagnosis TMJ    When order is in please Fax # [phone]

## 2025-05-20 ENCOUNTER — TELEPHONE (OUTPATIENT)
Dept: INTERNAL MEDICINE | Facility: CLINIC | Age: 65
End: 2025-05-20
Payer: MEDICARE

## 2025-05-20 NOTE — TELEPHONE ENCOUNTER
Caller: Leonie Becker    Relationship: Self    Best call back number: [phone]    What is the best time to reach you: ANYTIME     Who are you requesting to speak with (clinical staff, provider,  specific staff member): MA OR DR. RUVALCABA     Do you know the name of the person who called: PATIENT     What was the call regarding: THE CT CARDIAC CALCIUM SCORE WITHOUT DYE ORDER, AND IF THIS IS THE CORRECT TESTING THAT IS NEEDING TO BE DONE     Is it okay if the provider responds through MyChart: NO

## 2025-05-20 NOTE — TELEPHONE ENCOUNTER
Do not quite understand message.  Patient has order in chart for CT cardiac calcium score without dye as well as vascular screening ultrasound bundle.  Reviewed recent annual visit with Dr. Oconnell noting hyperlipidemia history.  The current orders would be appropriate for screening for vascular disease

## 2025-05-21 ENCOUNTER — TELEPHONE (OUTPATIENT)
Dept: NEUROSURGERY | Facility: CLINIC | Age: 65
End: 2025-05-21

## 2025-05-21 NOTE — TELEPHONE ENCOUNTER
PATIENT CALLED TO MAKE SURE SHE DOES NOT GET LOSS IN THE HAY     STATES SHE WAS SUPPOSED TO SEE DR. MERCADO TODAY BUT WAS CANCELED BY THE OFFICE     WANTS TO MAKE SURE SHE GETS SCHEDULED AS SOON AS POSSIBLE     PLEASE CALL PATIENT TO RESCHEDULE     [phone] (home)     THANK YOU!

## 2025-05-21 NOTE — TELEPHONE ENCOUNTER
Called and spoke to patient. Informed her that she did not get loss in the reschedule. Informed her  is not feeling well and will be out for a couple of days. Once he gets back we will look over the schedule and see where to place her for a reschedule appt. Informed patient we will reach out.

## 2025-05-26 NOTE — TELEPHONE ENCOUNTER
Call and advise.  These are the correct tests.  It has been 7 years since last coronary calcium screen.  FLO
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
## 2025-06-02 ENCOUNTER — OFFICE VISIT (OUTPATIENT)
Dept: NEUROSURGERY | Facility: CLINIC | Age: 65
End: 2025-06-02
Payer: MEDICARE

## 2025-06-02 ENCOUNTER — TELEPHONE (OUTPATIENT)
Dept: SURGERY | Facility: CLINIC | Age: 65
End: 2025-06-02
Payer: MEDICARE

## 2025-06-02 VITALS
TEMPERATURE: 97.3 F | SYSTOLIC BLOOD PRESSURE: 114 MMHG | WEIGHT: 105.7 LBS | OXYGEN SATURATION: 97 % | BODY MASS INDEX: 18.73 KG/M2 | HEIGHT: 63 IN | DIASTOLIC BLOOD PRESSURE: 62 MMHG | HEART RATE: 62 BPM

## 2025-06-02 DIAGNOSIS — M54.16 LUMBAR RADICULITIS: Primary | ICD-10-CM

## 2025-06-02 PROCEDURE — 99213 OFFICE O/P EST LOW 20 MIN: CPT | Performed by: NURSE PRACTITIONER

## 2025-06-02 PROCEDURE — 1159F MED LIST DOCD IN RCRD: CPT | Performed by: NURSE PRACTITIONER

## 2025-06-02 PROCEDURE — 1160F RVW MEDS BY RX/DR IN RCRD: CPT | Performed by: NURSE PRACTITIONER

## 2025-06-02 RX ORDER — HYDROCORTISONE 25 MG/G
CREAM TOPICAL
Qty: 30 G | Refills: 1 | Status: SHIPPED | OUTPATIENT
Start: 2025-06-02

## 2025-06-19 ENCOUNTER — TELEPHONE (OUTPATIENT)
Dept: SURGERY | Facility: CLINIC | Age: 65
End: 2025-06-19
Payer: MEDICARE

## 2025-06-19 RX ORDER — HYDROCORTISONE ACETATE 25 MG/1
25 SUPPOSITORY RECTAL EVERY 12 HOURS
Qty: 14 SUPPOSITORY | Refills: 0 | Status: SHIPPED | OUTPATIENT
Start: 2025-06-19 | End: 2025-06-26

## 2025-06-19 NOTE — TELEPHONE ENCOUNTER
Spoke with pt she is wanting a suppository sent in versus cream due to difficulty applying and concern of amount used. Meghna sent rx. Pt notified. I also advised pt sometimes these are not covered by ins and if this is the case continue using cream- a dime sized amount. Pt understood and appreciative of call back.

## 2025-06-25 ENCOUNTER — TELEPHONE (OUTPATIENT)
Dept: NEUROSURGERY | Facility: CLINIC | Age: 65
End: 2025-06-25
Payer: MEDICARE

## 2025-06-25 NOTE — TELEPHONE ENCOUNTER
Caller: DAYANARA    Relationship: SELF    Best call back number: [phone]    What was the call regarding: PATIENT CALLED ASKING FOR NILSA SOUZA TO PLEASE GIVE HER A CALL BACK DUE TO A CHANGES IN HER SITUATION WITH HER LEGS     DID SCHEDULE FOLLOW UP APPT WITH [facility] ON 07.15.25    PLEASE CALL BACK ASAP  THANK YOU

## 2025-06-26 NOTE — TELEPHONE ENCOUNTER
LM for patient asking what her symptoms currently are, more detail is needed regarding her symptoms    Clinical staff is able to s/w patient

## 2025-06-26 NOTE — TELEPHONE ENCOUNTER
Patient called stating that her symptoms in her legs are improving with walking and icing, she states that she does not want to have an MRI with her symptoms are improving

## (undated) DEVICE — SENSR O2 OXIMAX FNGR A/ 18IN NONSTR

## (undated) DEVICE — CANNULA,OXY,ADULT,SUPER SOFT,W/14'TUB,UC: Brand: MEDLINE INDUSTRIES, INC.

## (undated) DEVICE — ADAPT CLN BIOGUARD AIR/H2O DISP

## (undated) DEVICE — KT ORCA ORCAPOD DISP STRL

## (undated) DEVICE — LN SMPL CO2 SHTRM SD STREAM W/M LUER

## (undated) DEVICE — TUBING, SUCTION, 1/4" X 10', STRAIGHT: Brand: MEDLINE

## (undated) DEVICE — CANN O2 ETCO2 FITS ALL CONN CO2 SMPL A/ 7IN DISP LF